# Patient Record
Sex: FEMALE | Race: BLACK OR AFRICAN AMERICAN | Employment: OTHER | URBAN - METROPOLITAN AREA
[De-identification: names, ages, dates, MRNs, and addresses within clinical notes are randomized per-mention and may not be internally consistent; named-entity substitution may affect disease eponyms.]

---

## 2017-06-20 ENCOUNTER — HOSPITAL ENCOUNTER (OUTPATIENT)
Dept: LAB | Age: 67
Discharge: HOME | End: 2017-06-20
Payer: MEDICARE

## 2017-06-20 LAB
ALBUMIN SERPL BCP-MCNC: 3.5 G/DL (ref 3.4–5)
ALBUMIN/GLOB SERPL: 1.1 {RATIO} (ref 0.8–1.7)
ALP SERPL-CCNC: 102 U/L (ref 45–117)
ALT SERPL-CCNC: 18 U/L (ref 13–56)
ANION GAP BLD CALC-SCNC: 11 MMOL/L (ref 3–18)
AST SERPL W P-5'-P-CCNC: 10 U/L (ref 15–37)
BILIRUB SERPL-MCNC: 0.3 MG/DL (ref 0.2–1)
BUN SERPL-MCNC: 23 MG/DL (ref 7–18)
BUN/CREAT SERPL: 22 (ref 12–20)
CALCIUM SERPL-MCNC: 9.3 MG/DL (ref 8.5–10.1)
CHLORIDE SERPL-SCNC: 103 MMOL/L (ref 100–108)
CHOLEST SERPL-MCNC: 192 MG/DL
CO2 SERPL-SCNC: 26 MMOL/L (ref 21–32)
CREAT SERPL-MCNC: 1.06 MG/DL (ref 0.6–1.3)
ERYTHROCYTE [DISTWIDTH] IN BLOOD BY AUTOMATED COUNT: 14.4 % (ref 11.6–14.5)
GLOBULIN SER CALC-MCNC: 3.1 G/DL (ref 2–4)
GLUCOSE SERPL-MCNC: 148 MG/DL (ref 74–99)
HBA1C MFR BLD: 8.1 % (ref 4.2–5.6)
HCT VFR BLD AUTO: 42.6 % (ref 35–45)
HDLC SERPL-MCNC: 39 MG/DL (ref 40–60)
HDLC SERPL: 4.9 {RATIO} (ref 0–5)
HGB BLD-MCNC: 13.8 G/DL (ref 12–16)
LDLC SERPL CALC-MCNC: 112.6 MG/DL (ref 0–100)
LIPID PROFILE,FLP: ABNORMAL
MCH RBC QN AUTO: 27.8 PG (ref 24–34)
MCHC RBC AUTO-ENTMCNC: 32.4 G/DL (ref 31–37)
MCV RBC AUTO: 85.7 FL (ref 74–97)
PLATELET # BLD AUTO: 281 K/UL (ref 135–420)
PMV BLD AUTO: 11.8 FL (ref 9.2–11.8)
POTASSIUM SERPL-SCNC: 4 MMOL/L (ref 3.5–5.5)
PROT SERPL-MCNC: 6.6 G/DL (ref 6.4–8.2)
RBC # BLD AUTO: 4.97 M/UL (ref 4.2–5.3)
SODIUM SERPL-SCNC: 140 MMOL/L (ref 136–145)
TRIGL SERPL-MCNC: 202 MG/DL (ref ?–150)
VLDLC SERPL CALC-MCNC: 40.4 MG/DL
WBC # BLD AUTO: 6.3 K/UL (ref 4.6–13.2)

## 2017-06-20 PROCEDURE — 80053 COMPREHEN METABOLIC PANEL: CPT | Performed by: INTERNAL MEDICINE

## 2017-06-20 PROCEDURE — 83036 HEMOGLOBIN GLYCOSYLATED A1C: CPT | Performed by: INTERNAL MEDICINE

## 2017-06-20 PROCEDURE — 80061 LIPID PANEL: CPT | Performed by: INTERNAL MEDICINE

## 2017-06-20 PROCEDURE — 36415 COLL VENOUS BLD VENIPUNCTURE: CPT | Performed by: INTERNAL MEDICINE

## 2017-06-20 PROCEDURE — 85027 COMPLETE CBC AUTOMATED: CPT | Performed by: INTERNAL MEDICINE

## 2017-06-23 NOTE — ACP (ADVANCE CARE PLANNING)
Advance Care Planning    Advance Care Planning (ACP) Provider Note - Comprehensive     Date of ACP Conversation: 06/27/17  Persons included in Conversation:  patient  Length of ACP Conversation in minutes:  16 minutes    Authorized Decision Maker (if patient is incapable of making informed decisions): This person is: sister Connor Bragg  Other Legally Authorized Decision Maker (e.g. Next of Kin)          General ACP for ALL Patients with Decision Making Capacity:   Importance of advance care planning, including choosing a healthcare agent to communicate patient's healthcare decisions if patient lost the ability to make decisions, such as after a sudden illness or accident  Understanding of the healthcare agent role was assessed and information provided  Exploration of values, goals, and preferences if recovery is not expected, even with continued medical treatment in the event of: Imminent death  Severe, permanent brain injury    Review of Existing Advance Directive:  na    For Serious or Chronic Illness:  Understanding of medical condition    Understanding of CPR, goals and expected outcomes, benefits and burdens discussed.     Interventions Provided:  Recommended completion of Advance Directive form after review of ACP materials and conversation with prospective healthcare agent   Recommended communicating the plan and making copies for the healthcare agent, personal physician, and others as appropriate (e.g., health system)  Recommended review of completed ACP document annually or upon change in health status

## 2017-06-23 NOTE — PROGRESS NOTES
This is a subsequent medicare wellness visit    I have reviewed the patient's medical history in detail and updated the computerized patient record. History     Past Medical History:   Diagnosis Date    Calculus of kidney 6/16    Dr Triston Colin; right ureteral stone s/p EWSL    Diabetes ([address]     Dr Elizabeth Cornelius GERD (gastroesophageal reflux disease)     Hyperlipidemia     Hypertension     Hypovitaminosis D       Past Surgical History:   Procedure Laterality Date    ECHO 2D ADULT  3/12    conc lvh, ef 60%, DD    HX BREAST BIOPSY  2009    negative    HX COLONOSCOPY  12/13    negative Dr Zahra Truong t score 1.10 spine, 1.10 hip (11/13)    STRESS TEST THALLIUM STUDY  3/12    negative w EF 70%     Current Outpatient Prescriptions   Medication Sig Dispense Refill    allopurinol (ZYLOPRIM) 100 mg tablet Take  by mouth daily.  insulin lispro protamine/insulin lispro (HUMALOG MIX 75/25) 100 unit/mL (75-25) injection 10 Units by SubCUTAneous route Daily (before breakfast). Indications: TYPE 2 DIABETES MELLITUS      canagliflozin (INVOKANA) 100 mg tab Take 300 mg by mouth daily. Indications: TYPE 2 DIABETES MELLITUS      carvedilol (COREG) 12.5 mg tablet Take 12.5 mg by mouth daily.  atorvastatin (LIPITOR) 20 mg tablet Take  by mouth daily.  metFORMIN (GLUCOPHAGE) 1,000 mg tablet Take 1,000 mg by mouth two (2) times daily (with meals).  amLODIPine (NORVASC) 10 mg tablet Take  by mouth daily.  olmesartan-hydrochlorothiazide (BENICAR HCT) 40-25 mg per tablet Take 1 Tab by mouth daily.  Cholecalciferol, Vitamin D3, (VITAMIN D) 5,000 unit Tab Take 2,000 Units by mouth.  Takes 12 units am and 14 units pm       No Known Allergies  Family History   Problem Relation Age of Onset    Diabetes Father     Hypertension Father     Cancer Sister     Hypertension Sister      Social History   Substance Use Topics    Smoking status: Passive Smoke Exposure - Never Smoker    Smokeless tobacco: Never Used    Alcohol use 1.2 oz/week     1 Glasses of wine, 1 Cans of beer, 0 Standard drinks or equivalent per week      Comment: social glass once a year     Diet, Lifestyle: generally follows a low fat low cholesterol diet, generally follows a low sodium diet, follows a diabetic diet regularly, exercises sporadically    Exercise level: not active    Depression Risk Screen     PHQ over the last two weeks 6/27/2017   Little interest or pleasure in doing things Not at all   Feeling down, depressed or hopeless Not at all   Total Score PHQ 2 0     SCREENINGS  Colonoscopy last done 12/13 Dr Sophia Borja last done 8/16  DEXA last done 11/13  Dr Willy Solares last done >5 yrs ago due to patient declining    Immunization History   Administered Date(s) Administered    Influenza High Dose Vaccine PF 12/27/2016    Influenza Vaccine 11/24/2015    Influenza Vaccine PF 12/18/2014    Influenza Vaccine Whole 10/01/2009, 12/07/2012    Pneumococcal Conjugate (PCV-13) 12/29/2015    Pneumococcal Vaccine (Unspecified Type) 02/29/2012    TDAP Vaccine 02/29/2012    Zoster 06/21/2012     Alcohol Risk Screen   On any occasion during the past 3 months, have you had more than 3 drinks containing alcohol? No    Do you average more than 7 drinks per week? No    Functional Ability and Level of Safety     Hearing Loss   normal-to-mild    Vision - no acute complaints and is followed by Dr Emma Gayle [facility]   Self-care     Fall Risk Screen     Fall Risk Assessment, last 12 mths 6/27/2017   Able to walk? Yes   Fall in past 12 months? No   Fall with injury? -   Number of falls in past 12 months -   Fall Risk Score -     Abuse Screen   Patient is not abused    Review of Systems   A comprehensive review of systems was negative except for that written in the HPI.     Physical Examination     Visit Vitals    /78 (BP 1 Location: Left arm, BP Patient Position: Sitting)    Pulse 76    Temp 98.4 °F (36.9 °C) (Oral)    Resp 17    Ht 5' 3\" (1.6 m)    Wt 158 lb (71.7 kg)    SpO2 98%    BMI 27.99 kg/m2       Patient Care Team:  Gibson Rondon MD as PCP - General (Internal Medicine)  Patty Mathew, RN as Ambulatory Care Navigator (Internal Medicine)  Leonard Jansen MD (Ophthalmology)  Randi Quan, CASSANDRA as Ambulatory Care Navigator (Internal Medicine)  Hilda Soto NP     End-of-life planning  Advanced Directive discussed and documented: YES    Assessment/Plan   Education and counseling provided:  Are appropriate based on today's review and evaluation       ICD-10-CM ICD-9-CM    1. Routine general medical examination at a health care facility Z00.00 V70.0    2. Screening for alcoholism Z13.89 V79.1    3. Advanced directives, counseling/discussion Z71.89 V65.49 ADVANCE CARE PLANNING FIRST 30 MINS   4. Screening for depression Z13.89 V79.0 DEPRESSION SCREEN ANNUAL   5. Screen for colon cancer Z12.11 V76.51    6. Screening for ischemic heart disease Z13.6 V81.0    7. Encounter for screening mammogram for malignant neoplasm of breast Z12.31 V76.12    8. Postmenopausal Z78.0 V49.81    9. Uncontrolled type 2 diabetes mellitus without complication, with long-term current use of insulin (HCC) E11.65 250.02 MICROALBUMIN, UR, RAND W/ MICROALBUMIN/CREA RATIO    Z79.4 V58.67 HEMOGLOBIN A1C W/O EAG   10. Dyslipidemia E78.5 272.4    11. Essential hypertension I10 401.9    12. Nephrolithiasis s/p EWSL Dr Sandra Arevalo 6/16 N20.0 592.0    13. GERD without esophagitis K21.9 530.81      current treatment plan is effective, no change in therapy  lab results and schedule of future lab studies reviewed with patient  reviewed diet, exercise and weight control  cardiovascular risk and specific lipid/LDL goals reviewed  use of aspirin to prevent MI and TIA's discussed. End of life discussion undertaken.   She will work on getting medical directive in place  Colonoscopy to be scheduled 2023  Mammo scheduled  DEXA to be scheduled per Dr Avis Linares

## 2017-06-23 NOTE — PATIENT INSTRUCTIONS
Medicare Part B Preventive Services Limitations Recommendation Scheduled   Bone Mass Measurement  (age 72 & older, biennial) Requires diagnosis related to osteoporosis or estrogen deficiency. Biennial benefit unless patient has history of long-term glucocorticoid tx or baseline is needed because initial test was by other method     Cardiovascular Screening Blood Tests (every 5 years)  Total cholesterol, HDL, Triglycerides Order as a panel if possible     Colorectal Cancer Screening  -Fecal occult blood test (annual)  -Flexible sigmoidoscopy (5y)  -Screening colonoscopy (10y)  -Barium Enema      Counseling to Prevent Tobacco Use (up to 8 sessions per year)  - Counseling greater than 3 and up to 10 minutes  - Counseling greater than 10 minutes Patients must be asymptomatic of tobacco-related conditions to receive as preventive service     Diabetes Screening Tests (at least every 3 years, Medicare covers annually or at 6-month intervals for prediabetic patients)    Fasting blood sugar (FBS) or glucose tolerance test (GTT) Patient must be diagnosed with one of the following:  -Hypertension, Dyslipidemia, obesity, previous impaired FBS or GTT  Or any two of the following: overweight, FH of diabetes, age ? 72, history of gestational diabetes, birth of baby weighing more than 9 pounds     Diabetes Self-Management Training (DSMT) (no USPSTF recommendation) Requires referral by treating physician for patient with diabetes or renal disease. 10 hours of initial DSMT session of no less than 30 minutes each in a continuous 12-month period. 2 hours of follow-up DSMT in subsequent years.      Glaucoma Screening (no USPSTF recommendation) Diabetes mellitus, family history, , age 48 or over,  American, age 72 or over     Human Immunodeficiency Virus (HIV) Screening (annually for increased risk patients)  HIV-1 and HIV-2 by EIA, FRANCINE, rapid antibody test, or oral mucosa transudate Patient must be at increased risk for HIV infection per USPSTF guidelines or pregnant. Tests covered annually for patients at increased risk. Pregnant patients may receive up to 3 test during pregnancy. Medical Nutrition Therapy (MNT) (for diabetes or renal disease not recommended schedule) Requires referral by treating physician for patient with diabetes or renal disease. Can be provided in same year as diabetes self-management training (DSMT), and CMS recommends medical nutrition therapy take place after DSMT. Up to 3 hours for initial year and 2 hours in subsequent years. Prostate Cancer Screening (annually up to age 76)  - Digital rectal exam (HITESH)  - Prostate specific antigen (PSA) Annually (age 48 or over), HITESH not paid separately when covered E/M service is provided on same date     Seasonal Influenza Vaccination (annually)      Pneumococcal Vaccination (once after 72)      Hepatitis B Vaccinations (if medium/high risk) Medium/high risk factors:  End-stage renal disease,  Hemophiliacs who received Factor VIII or IX concentrates, Clients of institutions for the mentally retarded, Persons who live in the same house as a HepB virus carrier, Homosexual men, Illicit injectable drug abusers. Screening Mammography (biennial age 54-69)? Annually (age 36 or over)     Screening Pap Tests and Pelvic Examination (up to age 79 and after 79 if unknown history or abnormal study last 10 years) Every 25 months except high risk     Ultrasound Screening for Abdominal Aortic Aneurysm (AAA) (once) Patient must be referred through IPPE and not have had a screening for abdominal aortic aneurysm before under Medicare.   Limited to patients who meet one of the following criteria:  - Men who are 73-68 years old and have smoked more than 100 cigarettes in their lifetime.  -Anyone with a FH of AAA  -Anyone recommended for screening by USPSTF         Vaccine Information Statement    Pneumococcal Polysaccharide Vaccine: What You Need to Know    Many Vaccine Information Statements are available in [place] and other languages. See www.immunize.org/vis. Hojas de información Sobre Vacunas están disponibles en español y en muchos otros idiomas. Visite Patience.si. 1. Why get vaccinated? Vaccination can protect older adults (and some children and younger adults) from pneumococcal disease. Pneumococcal disease is caused by bacteria that can spread from person to person through close contact. It can cause ear infections, and it can also lead to more serious infections of the:   Lungs (pneumonia),   Blood (bacteremia), and   Covering of the brain and spinal cord (meningitis). Meningitis can cause deafness and brain damage, and it can be fatal.      Anyone can get pneumococcal disease, but children under 3years of age, people with certain medical conditions, adults over 72years of age, and cigarette smokers are at the highest risk. About 18,000 older adults die each year from pneumococcal disease in the [place]. Treatment of pneumococcal infections with penicillin and other drugs used to be more effective. But some strains of the disease have become resistant to these drugs. This makes prevention of the disease, through vaccination, even more important. 2. Pneumococcal polysaccharide vaccine (PPSV23)    Pneumococcal polysaccharide vaccine (PPSV23) protects against 23 types of pneumococcal bacteria. It will not prevent all pneumococcal disease. PPSV23 is recommended for:   All adults 72years of age and older,   Anyone 2 through 59years of age with certain long-term health problems,   Anyone 2 through 59years of age with a weakened immune system,   Adults 23 through 59years of age who smoke cigarettes or have asthma. Most people need only one dose of PPSV. A second dose is recommended for certain high-risk groups.   People 72 and older should get a dose even if they have gotten one or more doses of the vaccine before they turned 72. Your healthcare provider can give you more information about these recommendations. Most healthy adults develop protection within 2 to 3 weeks of getting the shot. 3. Some people should not get this vaccine     Anyone who has had a life-threatening allergic reaction to PPSV should not get another dose.  Anyone who has a severe allergy to any component of PPSV should not receive it. Tell your provider if you have any severe allergies.  Anyone who is moderately or severely ill when the shot is scheduled may be asked to wait until they recover before getting the vaccine. Someone with a mild illness can usually be vaccinated.  Children less than 3years of age should not receive this vaccine.  There is no evidence that PPSV is harmful to either a pregnant woman or to her fetus. However, as a precaution, women who need the vaccine should be vaccinated before becoming pregnant, if possible. 4. Risks of a vaccine reaction    With any medicine, including vaccines, there is a chance of side effects. These are usually mild and go away on their own, but serious reactions are also possible. About half of people who get PPSV have mild side effects, such as redness or pain where the shot is given, which go away within about two days. Less than 1 out of 100 people develop a fever, muscle aches, or more severe local reactions. Problems that could happen after any vaccine:     People sometimes faint after a medical procedure, including vaccination. Sitting or lying down for about 15 minutes can help prevent fainting, and injuries caused by a fall. Tell your doctor if you feel dizzy, or have vision changes or ringing in the ears.  Some people get severe pain in the shoulder and have difficulty moving the arm where a shot was given. This happens very rarely.  Any medication can cause a severe allergic reaction.  Such reactions from a vaccine are very rare, estimated at about 1 in a million doses, and would happen within a few minutes to a few hours after the vaccination. As with any medicine, there is a very remote chance of a vaccine causing a serious injury or death. The safety of vaccines is always being monitored. For more information, visit: www.cdc.gov/vaccinesafety/     5. What if there is a serious reaction? What should I look for? Look for anything that concerns you, such as signs of a severe allergic reaction, very high fever, or unusual behavior. Signs of a severe allergic reaction can include hives, swelling of the face and throat, difficulty breathing, a fast heartbeat, dizziness, and weakness. These would usually start a few minutes to a few hours after the vaccination. What should I do? If you think it is a severe allergic reaction or other emergency that cant wait, call 9-1-1 or get to the nearest hospital. Otherwise, call your doctor. Afterward, the reaction should be reported to the Vaccine Adverse Event Reporting System (VAERS). Your doctor might file this report, or you can do it yourself through the VAERS web site at www.vaers. hhs.gov, or by calling 2-296.282.5313. VAERS does not give medical advice. 6. How can I learn more?  Ask your doctor. He or she can give you the vaccine package insert or suggest other sources of information.  Call your local or state health department.    Contact the Centers for Disease Control and Prevention (CDC):  - Call 2-619.124.4905 (1-800-CDC-INFO) or  - Visit CDCs website at [employer] 18 04/24/2015    [facility] for Disease Control and Prevention    Office Use Only

## 2017-06-23 NOTE — PROGRESS NOTES
77 y.o. BLACK OR  female who presents for evaluation. She seems to be doing well. No cardiovascular complaints. She continues to see Nika Vincent. Denies polyuria, polydipsia, nocturia, vision change. She is complaining about the cost of the insulin as she's in the donut hole already. Weight is up some    Vitals 6/27/2017 12/27/2016 7/7/2016 6/30/2016   Weight 158 lb 161 lb 150 lb 150 lb     No new GI or Gu complaints. She admits to missing doses of lipitor    She apparently ha da bad 3-4 week stretch in May where she had uri, went to urgent care for treatment    Past Medical History:   Diagnosis Date    Calculus of kidney 6/16    Dr Lyndel Leyden; right ureteral stone s/p EWSL    Diabetes ([address]     Dr Mj Wong GERD (gastroesophageal reflux disease)     Hyperlipidemia     Hypertension     Hypovitaminosis D      Past Surgical History:   Procedure Laterality Date    ECHO 2D ADULT  3/12    conc lvh, ef 60%, DD    HX BREAST BIOPSY  2009    negative    HX COLONOSCOPY  12/13    negative Dr Knott Och t score 1.10 spine, 1.10 hip (11/13)    STRESS TEST THALLIUM STUDY  3/12    negative w EF 70%     Social History     Social History    Marital status:      Spouse name: N/A    Number of children: N/A    Years of education: N/A     Occupational History    retired teacher [employer]      Social History Main Topics    Smoking status: Passive Smoke Exposure - Never Smoker    Smokeless tobacco: Never Used    Alcohol use 1.2 oz/week     1 Glasses of wine, 1 Cans of beer, 0 Standard drinks or equivalent per week      Comment: social glass once a year    Drug use: No    Sexual activity: Not on file     Other Topics Concern    Not on file     Social History Narrative     No Known Allergies    Current Outpatient Prescriptions   Medication Sig    allopurinol (ZYLOPRIM) 100 mg tablet Take  by mouth daily.     insulin lispro protamine/insulin lispro (HUMALOG MIX 75/25) 100 unit/mL (75-25) injection 10 Units by SubCUTAneous route Daily (before breakfast). Indications: TYPE 2 DIABETES MELLITUS    canagliflozin (INVOKANA) 100 mg tab Take 300 mg by mouth daily. Indications: TYPE 2 DIABETES MELLITUS    carvedilol (COREG) 12.5 mg tablet Take 12.5 mg by mouth daily.  atorvastatin (LIPITOR) 20 mg tablet Take  by mouth daily.  metFORMIN (GLUCOPHAGE) 1,000 mg tablet Take 1,000 mg by mouth two (2) times daily (with meals).  amLODIPine (NORVASC) 10 mg tablet Take  by mouth daily.  olmesartan-hydrochlorothiazide (BENICAR HCT) 40-25 mg per tablet Take 1 Tab by mouth daily.  Cholecalciferol, Vitamin D3, (VITAMIN D) 5,000 unit Tab Take 2,000 Units by mouth. Takes 12 units am and 14 units pm     No current facility-administered medications for this visit.       LAST MEDICARE WELLNESS EXAM: 12/29/15, 6/27/17   ACP 6/27/17    REVIEW OF SYSTEMS: gyn>5 yrs, mammo 8/16, DEXA 11/13, sees Dr Bhaskar Mishra, no podiatry, colo 12/13 Dr Urmila Farris  Ophtho - no vision change or eye pain  Oral - no mouth pain, tongue or tooth problems  Ears - no hearing loss, ear pain, fullness, no swallowing problems  Cardiac - no CP, PND, orthopnea, edema, palpitations or syncope  Chest - no breast masses  Resp - no wheezing, chronic coughing, dyspnea  GI - no heartburn, nausea, vomiting, change in bowel habits, bleeding, hemorrhoids  Urinary - no dysuria, hematuria, flank pain, urgency, frequency  Genitals - no genital lesions, discharge, masses, ulceration, warts  Ortho - no swelling, dec ROM, myalgias  Derm - no nail abnormalities, rashes, lesions of note, hair loss  Psych - denies any anxiety or depression symptoms, no hallucinations or violent ideation  Constitutional - no wt loss, night sweats, unexplained fevers  Neuro - no focal weakness, numbness, paresthesias, incoordination, ataxia, involuntary movements  Endo - no polyuria, polydipsia, nocturia, hot flashes    Visit Vitals    BP 132/78 (BP 1 Location: Left arm, BP Patient Position: Sitting)    Pulse 76    Temp 98.4 °F (36.9 °C) (Oral)    Resp 17    Ht 5' 3\" (1.6 m)    Wt 158 lb (71.7 kg)    SpO2 98%    BMI 27.99 kg/m2     A&O x3  Affect is appropriate. Mood stable  No apparent distress  Anicteric, no JVD, adenopathy or thyromegaly. No carotid bruits or radiated murmur  Lungs clear to auscultation, no wheezes or rales  Heart showed regular rate and rhythm. No murmur, rubs, gallops  Abdomen soft nontender, no hepatosplenomegaly or masses.    Ext without c/c/e  Foot exam bilaterally showed  Reflexes 2+   Vibration, proprioception, filament test intact  Pulses 1-2+ DP and PT        LABS  From 2/12 showed   gluc 330, cr 0.93, gfr 77,  alt 30, hba1c 11.2,                   chol 199, tg 366, hdl 35, ldl-c 67, wbc 7.1, hb 15.2, plt 273, tsh 2.20  From 6/12 showed                   hba1c 8.2,   ldl-p 1018, chol 112, tg 95,   hdl 32, ldl-c 61, umar 1.5  From 12/12 showed                   hba1c 8.9,   ldl-p 1486, chol 132, tg 109, hdl 37, ldl-c 73  From 6/13 showed   gluc 128, cr 0.91, gfr 68,  alt 13, hba1c 7.8,                  chol 115, tg 157, hdl 28, ldl-c 56  From 12/13 showed                   hba1c 7.5,                wbc 7.5, hb 13.2, plt 297, vit d 69.2  From 6/14 showed   gluc 171, cr 0.85, gfr>60, alt<5,        chol 134, tg 165, hdl 26, ldl-c 73  From 12/14 showed        hba1c 9.2,       chol 151, tg 256, hdl 33, ldl-c 67, umar 4.0  From 3/15 showed       hba1c 7.3,              ldl-c 82, umar neg  From 7/15 showed   gluc 130, cr 1.16, gfr 49,  alt 15, hba1c 7.5,       chol 128, tg 271, hdl 29, ldl-c 45  From 12/15 showed        hba1c 9.6,        chol 160, tg 218, hdl 33, ldl-c 83, umar 7.0  From 2/16 showed   gluc 117, cr 1.15, gfr 58,  alt 18, hba1c 7.9,       chol 112, tg 184, hdl 30, ldl-c 45, umar 4.3, vit d 45.3, uric 7.2  From 6/16 showed   gluc 98      hba1c 7.7,       chol 143, tg 178, hdl 31, ldl-c 76  From 12/16 showed gluc 170, cr 1.11, gfr 60,    hba1c 8.9,                 umar 4    Results for orders placed or performed during the hospital encounter of 06/20/17   LIPID PANEL   Result Value Ref Range    LIPID PROFILE          Cholesterol, total 192 <200 MG/DL    Triglyceride 202 (H) <150 MG/DL    HDL Cholesterol 39 (L) 40 - 60 MG/DL    LDL, calculated 112.6 (H) 0 - 100 MG/DL    VLDL, calculated 40.4 MG/DL    CHOL/HDL Ratio 4.9 0 - 5.0     HEMOGLOBIN A1C W/O EAG   Result Value Ref Range    Hemoglobin A1c 8.1 (H) 4.2 - 5.6 %   METABOLIC PANEL, COMPREHENSIVE   Result Value Ref Range    Sodium 140 136 - 145 mmol/L    Potassium 4.0 3.5 - 5.5 mmol/L    Chloride 103 100 - 108 mmol/L    CO2 26 21 - 32 mmol/L    Anion gap 11 3.0 - 18 mmol/L    Glucose 148 (H) 74 - 99 mg/dL    BUN 23 (H) 7.0 - 18 MG/DL    Creatinine 1.06 0.6 - 1.3 MG/DL    BUN/Creatinine ratio 22 (H) 12 - 20      GFR est AA >60 >60 ml/min/1.73m2    GFR est non-AA 52 (L) >60 ml/min/1.73m2    Calcium 9.3 8.5 - 10.1 MG/DL    Bilirubin, total 0.3 0.2 - 1.0 MG/DL    ALT (SGPT) 18 13 - 56 U/L    AST (SGOT) 10 (L) 15 - 37 U/L    Alk.  phosphatase 102 45 - 117 U/L    Protein, total 6.6 6.4 - 8.2 g/dL    Albumin 3.5 3.4 - 5.0 g/dL    Globulin 3.1 2.0 - 4.0 g/dL    A-G Ratio 1.1 0.8 - 1.7     CBC W/O DIFF   Result Value Ref Range    WBC 6.3 4.6 - 13.2 K/uL    RBC 4.97 4.20 - 5.30 M/uL    HGB 13.8 12.0 - 16.0 g/dL    HCT 42.6 35.0 - 45.0 %    MCV 85.7 74.0 - 97.0 FL    MCH 27.8 24.0 - 34.0 PG    MCHC 32.4 31.0 - 37.0 g/dL    RDW 14.4 11.6 - 14.5 %    PLATELET 310 586 - 265 K/uL    MPV 11.8 9.2 - 11.8 FL     Patient Active Problem List   Diagnosis Code    Hypovitaminosis D E55.9    Dyslipidemia E78.5    Essential hypertension I10    GERD without esophagitis K21.9    Advance directive discussed with patient Z70.80    Nephrolithiasis s/p EWSL Dr Aly Ansari 6/16 N20.0    Uncontrolled type 2 diabetes mellitus without complication, with long-term current use of insulin NP Monica Hernandez E11.65, Z79.4     Assessment and plan:  1. Diabetes. F/u w [facility], Dr Darian Morales, no podiatry at this time. Discussed possibility of chaning over to humulin but she will have to take that up w endocrine  2. Dyslipidemia. Continue current   3. Hypertension. Continue current regimen althouogh we could possibly change to losartan/hct which might be cheaper  4. GERD. PPI and avoidance measures  5. Hypovit d. Doing well   6. Screening mammo ordered  7. Nephrolithiasis. F/U Dr Kaushik Davis        RTC 12/17      Above conditions discussed at length and patient vocalized understanding.   All questions answered to patient satifaction

## 2017-06-27 ENCOUNTER — OFFICE VISIT (OUTPATIENT)
Dept: INTERNAL MEDICINE CLINIC | Age: 67
End: 2017-06-27

## 2017-06-27 VITALS
HEIGHT: 63 IN | RESPIRATION RATE: 17 BRPM | HEART RATE: 76 BPM | DIASTOLIC BLOOD PRESSURE: 78 MMHG | TEMPERATURE: 98.4 F | BODY MASS INDEX: 28 KG/M2 | WEIGHT: 158 LBS | SYSTOLIC BLOOD PRESSURE: 132 MMHG | OXYGEN SATURATION: 98 %

## 2017-06-27 DIAGNOSIS — Z13.6 SCREENING FOR ISCHEMIC HEART DISEASE: ICD-10-CM

## 2017-06-27 DIAGNOSIS — Z12.31 ENCOUNTER FOR SCREENING MAMMOGRAM FOR MALIGNANT NEOPLASM OF BREAST: ICD-10-CM

## 2017-06-27 DIAGNOSIS — N20.0 NEPHROLITHIASIS: ICD-10-CM

## 2017-06-27 DIAGNOSIS — Z71.89 ADVANCED DIRECTIVES, COUNSELING/DISCUSSION: ICD-10-CM

## 2017-06-27 DIAGNOSIS — Z78.0 POSTMENOPAUSAL: ICD-10-CM

## 2017-06-27 DIAGNOSIS — Z12.11 SCREEN FOR COLON CANCER: ICD-10-CM

## 2017-06-27 DIAGNOSIS — Z00.00 ROUTINE GENERAL MEDICAL EXAMINATION AT A HEALTH CARE FACILITY: Primary | ICD-10-CM

## 2017-06-27 DIAGNOSIS — Z13.31 SCREENING FOR DEPRESSION: ICD-10-CM

## 2017-06-27 DIAGNOSIS — Z23 ENCOUNTER FOR IMMUNIZATION: ICD-10-CM

## 2017-06-27 DIAGNOSIS — Z13.39 SCREENING FOR ALCOHOLISM: ICD-10-CM

## 2017-06-27 DIAGNOSIS — E78.5 DYSLIPIDEMIA: ICD-10-CM

## 2017-06-27 DIAGNOSIS — K21.9 GERD WITHOUT ESOPHAGITIS: ICD-10-CM

## 2017-06-27 DIAGNOSIS — I10 ESSENTIAL HYPERTENSION: ICD-10-CM

## 2017-06-27 NOTE — PROGRESS NOTES
Chief Complaint   Patient presents with    Results     labs. 6 months f/u    Hypertension    Diabetes    Vitamin D Deficiency    Cholesterol Problem     Patient stated that she is here for her routine follow up with lab review. 1. Have you been to the ER, urgent care clinic since your last visit? Hospitalized since your last visit? Yes, [facility] with the [facility]. Notes under media. 2. Have you seen or consulted any other health care providers outside of the [address] [place] [address] since your last visit? Include any pap smears or colon screening. Yes. [facility].    Zoe Cabello is a 77 y.o. female who presents for routine immunizations. She denies any symptoms , reactions or allergies that would exclude them from being immunized today. Risks and adverse reactions were discussed and the VIS was given to them. All questions were addressed. She was observed for 5 min post injection. There were no reactions observed.     Marcello Massey LPN

## 2017-06-27 NOTE — MR AVS SNAPSHOT
Visit Information Date & Time Provider Department Dept. Phone Encounter #  
 6/27/2017  8:00 AM Karin Alpers, MD Internist of [address] [place] [address] [phone] [id number] Your Appointments 1/2/2018  8:00 AM  
LAB with IOC NURSE VISIT Internist of [facility] ([facility]) Appt Note: lab  
 [address], [address] [facility] [address] [place]  
  
   
 [address], [address]  
  
    
 1/9/2018  3:00 PM  
PHYSICAL with Karin Alpers, MD  
Internist of [facility] [address] [place] [address]) Appt Note: r[address] rd   
 [address] [facility], [address] [address] [address] [place]  
  
   
 [address], [address] Upcoming Health Maintenance Date Due  
 EYE EXAM RETINAL OR DILATED Q1 10/9/2016 INFLUENZA AGE 9 TO ADULT 8/1/2017 GLAUCOMA SCREENING Q2Y 10/9/2017 HEMOGLOBIN A1C Q6M 12/20/2017 MICROALBUMIN Q1 12/20/2017 LIPID PANEL Q1 6/20/2018 FOOT EXAM Q1 6/27/2018 MEDICARE YEARLY EXAM 6/28/2018 BREAST CANCER SCRN MAMMOGRAM 12/30/2018 DTaP/Tdap/Td series (2 - Td) 2/28/2022 COLONOSCOPY 12/2/2023 Allergies as of 6/27/2017  Review Complete On: 6/27/2017 By: Karin Alpers, MD  
 No Known Allergies Current Immunizations  Reviewed on 12/29/2015 Name Date Influenza High Dose Vaccine PF 12/27/2016 Influenza Vaccine 11/24/2015 Influenza Vaccine PF 12/18/2014 11:25 AM  
 Influenza Vaccine Whole 12/7/2012, 10/1/2009 Pneumococcal Conjugate (PCV-13) 12/29/2015  9:50 AM  
 Pneumococcal Vaccine (Unspecified Type) 2/29/2012 TDAP Vaccine 2/29/2012 Zoster 6/21/2012 Not reviewed this visit You Were Diagnosed With   
  
 Codes Comments Routine general medical examination at a health care facility    -  Primary ICD-10-CM: Z00.00 ICD-9-CM: V70.0 Screening for alcoholism     ICD-10-CM: Z13.89 ICD-9-CM: V79.1 Advanced directives, counseling/discussion     ICD-10-CM: Z71.89 ICD-9-CM: V65.49 Screening for depression     ICD-10-CM: Z13.89 ICD-9-CM: V79.0 Screen for colon cancer     ICD-10-CM: Z12.11 ICD-9-CM: V76.51 Screening for ischemic heart disease     ICD-10-CM: Z13.6 ICD-9-CM: V81.0 Encounter for screening mammogram for malignant neoplasm of breast     ICD-10-CM: Z12.31 
ICD-9-CM: V76.12 Postmenopausal     ICD-10-CM: Z78.0 ICD-9-CM: V49.81 Uncontrolled type 2 diabetes mellitus without complication, with long-term current use of insulin (HCC)     ICD-10-CM: E11.65, Z79.4 ICD-9-CM: 250.02, V58.67 Dyslipidemia     ICD-10-CM: E78.5 ICD-9-CM: 272.4 Essential hypertension     ICD-10-CM: I10 
ICD-9-CM: 401.9 Nephrolithiasis     ICD-10-CM: N20.0 ICD-9-CM: 592.0 GERD without esophagitis     ICD-10-CM: K21.9 ICD-9-CM: 530.81 Vitals BP Pulse Temp Resp Height(growth percentile) Weight(growth percentile) 132/78 (BP 1 Location: Left arm, BP Patient Position: Sitting) 76 98.4 °F (36.9 °C) (Oral) 17 5' 3\" (1.6 m) 158 lb (71.7 kg) SpO2 BMI OB Status Smoking Status 98% 27.99 kg/m2 Postmenopausal Passive Smoke Exposure - Never Smoker Vitals History BMI and BSA Data Body Mass Index Body Surface Area  
 27.99 kg/m 2 1.79 m 2 Preferred Pharmacy Pharmacy Name Phone [id number]. [place], [address] [facility] [phone] Your Updated Medication List  
  
   
This list is accurate as of: 6/27/17  8:43 AM.  Always use your most recent med list.  
  
  
  
  
 allopurinol 100 mg tablet Commonly known as:  Jose Elias Maximiliano Take  by mouth daily. atorvastatin 20 mg tablet Commonly known as:  LIPITOR Take  by mouth daily. BENICAR HCT 40-25 mg per tablet Generic drug:  olmesartan-hydroCHLOROthiazide Take 1 Tab by mouth daily. COREG 12.5 mg tablet Generic drug:  carvedilol Take 12.5 mg by mouth daily. insulin lispro protamine/insulin lispro 100 unit/mL (75-25) injection Commonly known as:  HUMALOG MIX 75/25  
10 Units by SubCUTAneous route Daily (before breakfast). Indications: TYPE 2 DIABETES MELLITUS INVOKANA 100 mg tablet Generic drug:  canagliflozin Take 300 mg by mouth daily. Indications: TYPE 2 DIABETES MELLITUS  
  
 metFORMIN 1,000 mg tablet Commonly known as:  GLUCOPHAGE Take 1,000 mg by mouth two (2) times daily (with meals). NORVASC 10 mg tablet Generic drug:  amLODIPine Take  by mouth daily. VITAMIN D 5,000 unit Tab tablet Generic drug:  cholecalciferol (VITAMIN D3) Take 2,000 Units by mouth. Takes 12 units am and 14 units pm  
  
  
  
  
We Performed the Following ADVANCE CARE PLANNING FIRST 30 MINS [53411 CPT(R)] Lyudmila 68 [[id number] \[facility] [place] [facility]\""] To-Do List   
 12/26/2017 Lab:  MICROALBUMIN, UR, RAND W/ MICROALBUMIN/CREA RATIO   
  
 12/27/2017 Lab:  HEMOGLOBIN A1C W/O EAG Patient Instructions Medicare Part B Preventive Services Limitations Recommendation Scheduled Bone Mass Measurement 
(age 72 & older, biennial) Requires diagnosis related to osteoporosis or estrogen deficiency. Biennial benefit unless patient has history of long-term glucocorticoid tx or baseline is needed because initial test was by other method Cardiovascular Screening Blood Tests (every 5 years) Total cholesterol, HDL, Triglycerides Order as a panel if possible Colorectal Cancer Screening 
-Fecal occult blood test (annual) -Flexible sigmoidoscopy (5y) 
-Screening colonoscopy (10y) -Barium Enema Counseling to Prevent Tobacco Use (up to 8 sessions per year) - Counseling greater than 3 and up to 10 minutes - Counseling greater than 10 minutes Patients must be asymptomatic of tobacco-related conditions to receive as preventive service Diabetes Screening Tests (at least every 3 years, Medicare covers annually or at 6-month intervals for prediabetic patients) Fasting blood sugar (FBS) or glucose tolerance test (GTT) Patient must be diagnosed with one of the following: 
-Hypertension, Dyslipidemia, obesity, previous impaired FBS or GTT 
Or any two of the following: overweight, FH of diabetes, age ? 72, history of gestational diabetes, birth of baby weighing more than 9 pounds Diabetes Self-Management Training (DSMT) (no USPSTF recommendation) Requires referral by treating physician for patient with diabetes or renal disease. 10 hours of initial DSMT session of no less than 30 minutes each in a continuous 12-month period. 2 hours of follow-up DSMT in subsequent years. Glaucoma Screening (no USPSTF recommendation) Diabetes mellitus, family history, , age 48 or over,  American, age 72 or over Human Immunodeficiency Virus (HIV) Screening (annually for increased risk patients) HIV-1 and HIV-2 by EIA, FRANCINE, rapid antibody test, or oral mucosa transudate Patient must be at increased risk for HIV infection per USPSTF guidelines or pregnant. Tests covered annually for patients at increased risk. Pregnant patients may receive up to 3 test during pregnancy. Medical Nutrition Therapy (MNT) (for diabetes or renal disease not recommended schedule) Requires referral by treating physician for patient with diabetes or renal disease. Can be provided in same year as diabetes self-management training (DSMT), and CMS recommends medical nutrition therapy take place after DSMT. Up to 3 hours for initial year and 2 hours in subsequent years. Prostate Cancer Screening (annually up to age 76) - Digital rectal exam (HITESH) - Prostate specific antigen (PSA) Annually (age 48 or over), HITESH not paid separately when covered E/M service is provided on same date Seasonal Influenza Vaccination (annually) Pneumococcal Vaccination (once after 65) Hepatitis B Vaccinations (if medium/high risk) Medium/high risk factors:  End-stage renal disease, Hemophiliacs who received Factor VIII or IX concentrates, Clients of institutions for the mentally retarded, Persons who live in the same house as a HepB virus carrier, Homosexual men, Illicit injectable drug abusers. Screening Mammography (biennial age 54-69)? Annually (age 36 or over) Screening Pap Tests and Pelvic Examination (up to age 79 and after 79 if unknown history or abnormal study last 10 years) Every 24 months except high risk Ultrasound Screening for Abdominal Aortic Aneurysm (AAA) (once) Patient must be referred through IPPE and not have had a screening for abdominal aortic aneurysm before under Medicare. Limited to patients who meet one of the following criteria: 
- Men who are 73-68 years old and have smoked more than 100 cigarettes in their lifetime. 
-Anyone with a FH of AAA 
-Anyone recommended for screening by USPSTF Introducing [facility] [place] [facility]! Bree Bradshaw introduces [employer] patient portal. Now you can access parts of your medical record, email your doctor's office, and request medication refills online. 1. In your internet browser, go to [email or web address] 2. Click on the First Time User? Click Here link in the Sign In box. You will see the New Member Sign Up page. 3. Enter your [employer] Access Code exactly as it appears below. You will not need to use this code after youve completed the sign-up process. If you do not sign up before the expiration date, you must request a new code. · [employer] Access Code: [email or web address] Expires: 7/24/2017 11:49 AM 
 
4. Enter the last four digits of your Social Security Number (xxxx) and Date of Birth (mm/dd/yyyy) as indicated and click Submit. You will be taken to the next sign-up page. 5. Create a [employer] ID. This will be your [employer] login ID and cannot be changed, so think of one that is secure and easy to remember. 6. Create a [employer] password. You can change your password at any time. 7. Enter your Password Reset Question and Answer. This can be used at a later time if you forget your password. 8. Enter your e-mail address. You will receive e-mail notification when new information is available in [address] 9. Click Sign Up. You can now view and download portions of your medical record. 10. Click the Download Summary menu link to download a portable copy of your medical information. If you have questions, please visit the Frequently Asked Questions section of the [employer] website. Remember, [employer] is NOT to be used for urgent needs. For medical emergencies, dial 911. Now available from your iPhone and Android! Please provide this summary of care documentation to your next provider. Your primary care clinician is listed as Zollie Button. If you have any questions after today's visit, please call [phone].

## 2017-08-02 ENCOUNTER — HOSPITAL ENCOUNTER (OUTPATIENT)
Dept: NUTRITION | Age: 67
Discharge: HOME | End: 2017-08-02
Payer: MEDICARE

## 2017-08-02 PROCEDURE — 97802 MEDICAL NUTRITION INDIV IN: CPT

## 2017-08-02 NOTE — PROGRESS NOTES
[address] [place] [address]  [address] [place] [address], Dilma Ward  Phone: [phone]  Fax: [phone]   Nutrition Assessment - Medical Nutrition Therapy   Outpatient Initial Evaluation         Patient Name: Jan North : 1950   Treatment Diagnosis: DM, mild CKD Onset Date:    Referral Source: Shiv Aponte MD Start of Care [facility]): 2017     Ht: 63 In  cm Wt: 154 lb  kg   BMI: 27.3  BMR   Male  BMR female [id number]     PMHx includes: DM, mild CKD, HTN     Medications of Nutritional Significance:   Metformin, Invokana, Humalog mix 12u Breakfast & 14u Dinner, BP meds, Vit D3     Subjective/Assessment:   76 yo female [facility] by [facility] for help with blood sugar control and meal planning. Pt states she baby sits a lot; sometimes stays up late and sleeps in (misses breakfast, has one meal, not hungry for dinner). Wants to work on consistency in meal schedule - discussed meal timing. Pt would also like help with meal planning; states she is getting tired of eating the same things - educated with balanced meal model and explained the pathophysiology of Type II Diabetes and insulin resistance and the rationale for dietary modification and increased activity. PT currently has a routine of walking on the treadmill at the gym for at least 30 mins 3x/wk. She is working her routine back up after experiencing Vertigo while at the gym ~1 yr ago. Pt is motivated to make changes to her eating habits and verbalized understanding recommendations; compliance is expected. Current Eating Patterns: Inconsistent meal schedule due to sleep pattern. If she has breakfast, it will be 1 egg, 3 strips celis & 1 toast (happens ~3x/wk). Snack throughout the day on vanilla wafers, cookies or an apple with PB. Drinks water with lemon, ~2 cans of soda per day, sometimes sweet tea - she is upset that she has started drinking soda again and wants to cut it out. Handouts/  Information Provided: [x]  Carbohydrates  [x]  Protein  []  Fiber  []  Serving Sizes  [x]  Meal and Snack Ideas  []  Food Journals [x]  Diabetes  []  Cholesterol  []  Sodium  [x]  Gen Nutr Guidelines  [x]  SBGM Guidelines  [x]  Others: Food label, non-starchy veg   Estimate Needs:   Calories: 1450  Protein: 55 Carbs: 180 Fat: 55   Kcal/day  g/day  g/day  g/day         0.8g/kg  50%  35%     Nutritional Goal - To promote lifestyle changes to result in:    []  Weight loss  [x]  Improved diabetic control  []  Decreased cholesterol levels  []  Decreased blood pressure  []  Weight maintenance []  Preventing any interactions associated with food allergies  []  Adequate weight gain toward goal weight  [x]  Other: Kidney protection     Recommendations: 1. Try to have something to eat within 2 hours of waking up. 2. Finish eating 2 hours prior to going to bed. 3. Educated pt on all carbohydrates found in foods and encouraged no more than 30-45 gm/meal and 10-20 gm/snack. 4. Start to gradually substitute water and Crystal Light for soda & sweet tea. 5. Test blood sugar before mealtime and 2 hours after that meal at least once per day. If blood sugar is high, write down what you ate prior to testing. Information Reviewed with: pt   Potential Barriers to Learning: none     Dietitian Signature: Claus Ryder MS, RD Date: 8/2/2017   Follow-up: Tues.  10/3/17 at 9am Time: 5:19 PM

## 2017-10-03 ENCOUNTER — APPOINTMENT (OUTPATIENT)
Dept: NUTRITION | Age: 67
End: 2017-10-03

## 2017-10-27 ENCOUNTER — HOSPITAL ENCOUNTER (OUTPATIENT)
Dept: VASCULAR SURGERY | Age: 67
Discharge: HOME | End: 2017-10-27
Attending: PSYCHIATRY & NEUROLOGY
Payer: MEDICARE

## 2017-10-27 ENCOUNTER — HOSPITAL ENCOUNTER (OUTPATIENT)
Age: 67
Discharge: HOME | End: 2017-10-27
Attending: PSYCHIATRY & NEUROLOGY
Payer: MEDICARE

## 2017-10-27 DIAGNOSIS — I63.019 CEREBROVASCULAR ACCIDENT (CVA) DUE TO THROMBOSIS OF VERTEBRAL ARTERY (HCC): ICD-10-CM

## 2017-10-27 DIAGNOSIS — G25.0 ESSENTIAL TREMOR: ICD-10-CM

## 2017-10-27 PROCEDURE — 70551 MRI BRAIN STEM W/O DYE: CPT

## 2017-10-27 PROCEDURE — 93880 EXTRACRANIAL BILAT STUDY: CPT

## 2017-10-27 NOTE — PROCEDURES
[facility]  *** FINAL REPORT ***    Name: Christina Miguel  MRN: [id number]    Outpatient  : 29 Aug 1950  HIS Order #: [id number]  [address] [facility] Visit #: [id number]  Date: 27 Oct 2017    TYPE OF TEST: Cerebrovascular Duplex    REASON FOR TEST    Right Carotid:-             Proximal               Mid                 Distal  cm/s  Systolic  Diastolic  Systolic  Diastolic  Systolic  Diastolic  CCA:     51.9      10.0       73.0      12.0       73.0      12.0  Bulb:  ECA:    192.0  ICA:    124.0      27.0       96.0      20.0       73.0      17.0  ICA/CCA:  1.6       2.7    ICA Stenosis: <50%    Right Vertebral:-  Finding: Resistant  Sys:       41.0  Geraldine:    Right Subclavian: Normal    Left Carotid:-            Proximal                Mid                 Distal  cm/s  Systolic  Diastolic  Systolic  Diastolic  Systolic  Diastolic  CCA:    592.7      19.0       76.0      13.0       87.0      16.0  Bulb:  ECA:    159.0  ICA:    120.0      19.0       63.0      18.0       89.0      20.0  ICA/CCA:  1.1       1.0    ICA Stenosis: <50%    Left Vertebral:-  Finding: Antegrade  Sys:       49.0  Geraldine:       22.0    Left Subclavian: Normal    INTERPRETATION/FINDINGS  Duplex images were obtained using 2-D gray scale, color flow, and  spectral Doppler analysis. 1. Bilateral <50% stenosis of the internal carotid arteries. 2. No significant stenosis in the external carotid arteries  bilaterally. 3. 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 4. Antegrade flow in the left vertebral artery. 5. Normal flow in both subclavian arteries. Plaque Morphology:  1. Hyperechoic plaque in the bulb and right ICA. 2. Hyperechoic plaque in the bulb and left ICA. ADDITIONAL COMMENTS    I have personally reviewed the data relevant to the interpretation of  this  study.     TECHNOLOGIST: Agnieszka Cutler, [facility], RVT/  Signed: 10/27/2017 08:45 AM    PHYSICIAN: Christal STACIA Ibrahim   Signed: 10/27/2017 10:14 AM

## 2017-11-29 PROBLEM — G45.9 TIA (TRANSIENT ISCHEMIC ATTACK): Status: ACTIVE | Noted: 2017-11-29

## 2017-12-04 ENCOUNTER — PATIENT OUTREACH (OUTPATIENT)
Dept: INTERNAL MEDICINE CLINIC | Age: 67
End: 2017-12-04

## 2017-12-04 NOTE — PROGRESS NOTES
NNTOCIP Contact made: within 2 business days post discharge    Patient admitted to [facility] on 11/29/2017 to 12/1/2017, for TIA. Patient verified two patient identifiers. Introduced self, role and reason for call. Patient out eating breakfast, unable to review home medications at this, will bring to follow up appointment. Patient presenting symptoms:   Fall  Blurred vision  Dizziness  Bilateral lower extremity numbness     Patient denies:  Pain  SOB  Dizziness  Lightheadedness  Numbness  Tingling  Blurred vision  Vision changes  Fever  Chills  Speech changes  Weakness   Falls     Patient reports:  Denies any symptoms at this time  Out eating breakfast, will check blood sure afterwards, normally check fasting  Neurology follow up in January unsure of date and time      Barriers:   Financial: None identified at this time   Patients comprehension of disease: Patient verbalizes understanding of discharge plan and special follow up. Transportation: Self    Resources:  Spouse  Family     DME:  None    ADL's:  Patient is independent of all ADL's. Patient reported the following on ADL's:  Feeds self: YES  Ambulates: YES      Self-grooming: YES  Toileting: YES    Plan of care:  1. Take medications as prescribed  2. Attend all follow up appointments  3. Fall risk    Goals Addressed      Attends follow-up appointments as directed. Will attend all follow up appointments       Prevent complications post hospitalization. No admission within 30 days post discharge       Returns to baseline activity level. (pt-stated)                  To be fall free          Adherence to previous treatment and likelihood for follow up:  Patient verbalizes understanding of discharge plan and special follow up.     Appointments:  RENATA CHAUDHRY [id number] 12/7/2017 at 1430 with Dr. Melany Faith, January 2018 (unsure of date and time)    New medications prescribed:  None    Advance Care Planning:  Not on file    Utilization in the past 12 months:  1 hospitalization and 0 ed    RRAT score:   Not available, moderate risk     Patient verbalizes understanding self-management of medications, and when to seek medical attention from PCP. Patient instructed to bring all medications with them to their next appointment. Patient was given the opportunity to ask questions. Contact information was provided for future reference or further questions.

## 2017-12-07 ENCOUNTER — PATIENT OUTREACH (OUTPATIENT)
Dept: INTERNAL MEDICINE CLINIC | Age: 67
End: 2017-12-07

## 2017-12-07 ENCOUNTER — OFFICE VISIT (OUTPATIENT)
Dept: INTERNAL MEDICINE CLINIC | Age: 67
End: 2017-12-07

## 2017-12-07 VITALS
HEART RATE: 73 BPM | HEIGHT: 63 IN | SYSTOLIC BLOOD PRESSURE: 132 MMHG | DIASTOLIC BLOOD PRESSURE: 80 MMHG | RESPIRATION RATE: 14 BRPM | BODY MASS INDEX: 28.46 KG/M2 | OXYGEN SATURATION: 96 % | WEIGHT: 160.6 LBS | TEMPERATURE: 98.2 F

## 2017-12-07 DIAGNOSIS — E78.5 DYSLIPIDEMIA: ICD-10-CM

## 2017-12-07 DIAGNOSIS — G45.0 VERTEBROBASILAR TIAS: Primary | ICD-10-CM

## 2017-12-07 DIAGNOSIS — I10 ESSENTIAL HYPERTENSION: ICD-10-CM

## 2017-12-07 NOTE — PROGRESS NOTES
Goals Addressed             Most Recent     COMPLETED: Attends follow-up appointments as directed. On track (12/7/2017)             Will attend all follow up appointments   Patient attended his/er post discharge follow up appointment with Dr. Dwayne Vale as scheduled.

## 2017-12-07 NOTE — PROGRESS NOTES
1. Have you been to the ER, urgent care clinic or hospitalized since your last visit? YES. 11/29/17-12/1/17 [facility]      2. Have you seen or consulted any other health care providers outside of the [address] [place] [address] since your last visit (Include any pap smears or colon screening)? YES Neurologist in [facility] you have an Advanced Directive? NO    Would you like information on Advanced Directives?  NO

## 2017-12-07 NOTE — MR AVS SNAPSHOT
Visit Information Date & Time Provider Department Dept. Phone Encounter #  
 12/7/2017  2:15 PM Antionette Toscano MD [facility] [address] [place] [address] [phone] Your Appointments 1/2/2018  8:00 AM  
LAB with [facility] NURSE VISIT [facility] [address] [place] [address] ([facility] Appt Note: lab  
 [address], [address] [facility] [address] [place]  
  
   
 [address], [address]  
  
    
 1/9/2018  3:00 PM  
PHYSICAL with Antionette Toscano MD  
[facility] [address] [place] [address] [facility] Appt Note: [address]   
 [address] [facility], [address] [id number] [address] [place]  
  
   
 [address], [address] Upcoming Health Maintenance Date Due MICROALBUMIN Q1 12/20/2017 HEMOGLOBIN A1C Q6M 5/29/2018 FOOT EXAM Q1 6/27/2018 MEDICARE YEARLY EXAM 6/28/2018 EYE EXAM RETINAL OR DILATED Q1 11/8/2018 LIPID PANEL Q1 11/30/2018 BREAST CANCER SCRN MAMMOGRAM 12/30/2018 GLAUCOMA SCREENING Q2Y 11/8/2019 DTaP/Tdap/Td series (2 - Td) 2/28/2022 COLONOSCOPY 12/2/2023 Allergies as of 12/7/2017  Review Complete On: 12/7/2017 By: Raghavendra Mooney No Known Allergies Current Immunizations  Reviewed on 11/6/2017 Name Date Influenza High Dose Vaccine PF 12/27/2016 Influenza Vaccine 11/3/2017, 11/24/2015 Influenza Vaccine PF 12/18/2014 11:25 AM  
 Influenza Vaccine Whole 12/7/2012, 10/1/2009 Pneumococcal Conjugate (PCV-13) 12/29/2015  9:50 AM  
 Pneumococcal Polysaccharide (PPSV-23) 6/27/2017  8:46 AM  
 TDAP Vaccine 2/29/2012 ZZZ-RETIRED (DO NOT USE) Pneumococcal Vaccine (Unspecified Type) 2/29/2012 Zoster 6/21/2012 Not reviewed this visit Vitals BP Pulse Temp Resp Height(growth percentile) Weight(growth percentile) 132/80 73 98.2 °F (36.8 °C) 14 5' 3\" (1.6 m) 160 lb 9.6 oz (72.8 kg) SpO2 BMI OB Status Smoking Status 96% 28.45 kg/m2 Postmenopausal Passive Smoke Exposure - Never Smoker Vitals History BMI and BSA Data Body Mass Index Body Surface Area  
 28.45 kg/m 2 1.8 m 2 Preferred Pharmacy [facility] Name Phone [id number] [address] [phone] Your Updated Medication List  
  
   
This list is accurate as of: 12/7/17  3:19 PM.  Always use your most recent med list.  
  
  
  
  
 allopurinol 100 mg tablet Commonly known as:  Roby Minor Take  by mouth daily. aspirin delayed-release 81 mg tablet Take 1 Tab by mouth daily for 30 days. atorvastatin 20 mg tablet Commonly known as:  LIPITOR Take  by mouth daily. BENICAR HCT 40-25 mg per tablet Generic drug:  olmesartan-hydroCHLOROthiazide Take 1 Tab by mouth daily. COREG 12.5 mg tablet Generic drug:  carvedilol Take 25 mg by mouth daily. insulin lispro protamine/insulin lispro 100 unit/mL (75-25) injection Commonly known as:  HUMALOG MIX 75/25  
10 Units by SubCUTAneous route two (2) times a day. Indications: type 2 diabetes mellitus INVOKANA 100 mg tablet Generic drug:  canagliflozin Take 300 mg by mouth daily. Indications: TYPE 2 DIABETES MELLITUS  
  
 metFORMIN 1,000 mg tablet Commonly known as:  GLUCOPHAGE Take 1,000 mg by mouth two (2) times daily (with meals). NORVASC 10 mg tablet Generic drug:  amLODIPine Take  by mouth daily. VITAMIN D 5,000 unit Tab tablet Generic drug:  cholecalciferol (VITAMIN D3) Take 2,000 Units by mouth. Takes 12 units am and 14 units pm  
  
  
  
  
Introducing [employer]! [employer] introduces [employer] patient portal. Now you can access parts of your medical record, email your doctor's office, and request medication refills online. 1. In your internet browser, go to [email or web address] 2. Click on the First Time User? Click Here link in the Sign In box.  You will see the New Member Sign Up page. 3. Enter your [employer] Access Code exactly as it appears below. You will not need to use this code after youve completed the sign-up process. If you do not sign up before the expiration date, you must request a new code. · [employer] Access Code: [email or web address] Expires: 1/24/2018  7:45 AM 
 
4. Enter the last four digits of your Social Security Number (xxxx) and Date of Birth (mm/dd/yyyy) as indicated and click Submit. You will be taken to the next sign-up page. 5. Create a [employer] ID. This will be your [employer] login ID and cannot be changed, so think of one that is secure and easy to remember. 6. Create a [employer] password. You can change your password at any time. 7. Enter your Password Reset Question and Answer. This can be used at a later time if you forget your password. 8. Enter your e-mail address. You will receive e-mail notification when new information is available in [address]. 9. Click Sign Up. You can now view and download portions of your medical record. 10. Click the Download Summary menu link to download a portable copy of your medical information. If you have questions, please visit the Frequently Asked Questions section of the [employer] website. Remember, Re[employer] is NOT to be used for urgent needs. For medical emergencies, dial 911. Now available from your iPhone and Android! Please provide this summary of care documentation to your next provider. Your primary care clinician is listed as Drinda Epley. If you have any questions after today's visit, please call [phone].

## 2017-12-08 NOTE — PROGRESS NOTES
Patient being seen today for transitional care management    Patient was admitted to [address] [place] [address] from 11/29/17 to 12/1/17           Initial interactive contact was done by nurse navigator Sharifa Hannah    I thoroughly reviewed the discharge summary, notes, consults, labs and imaging studies in the electronic record. Pertinent details are summarized below and the record has been updated to reflect recent events    She apparently was referred to Dr Burt Newton by Dr Vitor Love in Oct when she reported diplopia. Dr Burt Newton ordered mri and carotids but she was never called the results. On 11/27/17, she was with her grandkids when she had an episode of being 'unable to move both her legs when she tried to stand up' which resulted in a fall. She was on the ground a few minutes but eventually everything resolved within a few minutes. No loc, presyncope, dizziness/lightheadedness, she was alert, no incontinence, tongue trauma. No prior h/o seizures or syncope. No cp, sob, edema, pleurisy. Two days later, she decided to present to [address] [place] [address] ER and was admitted. She was seen by neuro. EEG neg, CTA head/neck neg, echo neg, Dr Isrrael Avila was not able to give her specific dx outside of possible vertebrobasilar tia but suggested starting asa and f/u w Dr Burt Newton for further opinion. She has appt sometime in January.   No more sx since dc    She continues to see NP Margarie Primrose    Past Medical History:   Diagnosis Date    Calculus of kidney 6/16    Dr David Turner; right ureteral stone s/p EWSL    Diabetes ([facility] [address]     Dr Diehl Hands GERD (gastroesophageal reflux disease)     Hyperlipidemia     Hypertension     Hypovitaminosis D      Past Surgical History:   Procedure Laterality Date    ECHO 2D ADULT  3/12    conc lvh, ef 60%, DD    HX BREAST BIOPSY  2009    negative    HX COLONOSCOPY  12/13    negative Dr King Sos t score 1.10 spine, 1.10 hip (11/13)    NEUROLOGICAL PROCEDURE UNLISTED  11/2017    mri showed mod periventricular wm changes    STRESS TEST THALLIUM STUDY  3/12    negative w EF 70%    VASCULAR SURGERY PROCEDURE UNLIST  11/2017    carotids showed bilat <50% ICA, right vertebral art obst    VASCULAR SURGERY PROCEDURE UNLIST  12/2017    CTA head/neck showed no aneurysm, <50% right intracranial ICA, diminutive right vertebral art     Social History     Social History    Marital status:      Spouse name: N/A    Number of children: N/A    Years of education: N/A     Occupational History    retired teacher [employer]      Social History Main Topics    Smoking status: Passive Smoke Exposure - Never Smoker    Smokeless tobacco: Never Used    Alcohol use 1.2 oz/week     1 Glasses of wine, 1 Cans of beer, 0 Standard drinks or equivalent per week      Comment: social glass once a year    Drug use: No    Sexual activity: Not on file     Other Topics Concern    Not on file     Social History Narrative     Current Outpatient Prescriptions   Medication Sig    aspirin delayed-release 81 mg tablet Take 1 Tab by mouth daily for 30 days.  allopurinol (ZYLOPRIM) 100 mg tablet Take  by mouth daily.  insulin lispro protamine/insulin lispro (HUMALOG MIX 75/25) 100 unit/mL (75-25) injection 10 Units by SubCUTAneous route two (2) times a day. Indications: type 2 diabetes mellitus    canagliflozin (INVOKANA) 100 mg tab Take 300 mg by mouth daily. Indications: TYPE 2 DIABETES MELLITUS    carvedilol (COREG) 12.5 mg tablet Take 25 mg by mouth daily.  atorvastatin (LIPITOR) 20 mg tablet Take  by mouth daily.  metFORMIN (GLUCOPHAGE) 1,000 mg tablet Take 1,000 mg by mouth two (2) times daily (with meals).  amLODIPine (NORVASC) 10 mg tablet Take  by mouth daily.  olmesartan-hydrochlorothiazide (BENICAR HCT) 40-25 mg per tablet Take 1 Tab by mouth daily.  Cholecalciferol, Vitamin D3, (VITAMIN D) 5,000 unit Tab Take 2,000 Units by mouth.  Takes 12 units am and 14 units pm     No current facility-administered medications for this visit. No Known Allergies    Visit Vitals    /80    Pulse 73    Temp 98.2 °F (36.8 °C)    Resp 14    Ht 5' 3\" (1.6 m)    Wt 160 lb 9.6 oz (72.8 kg)    SpO2 96%    BMI 28.45 kg/m2     A&O x3  Affect is appropriate. Mood stable  No apparent distress  Anicteric, no JVD, adenopathy or thyromegaly. No carotid bruits or radiated murmur  Lungs clear to auscultation, no wheezes or rales  Heart showed regular rate and rhythm. No murmur, rubs, gallops  Abdomen soft nontender, no hepatosplenomegaly or masses. Extremities without edema. Pulses 1-2+ symmetrically    Patient Active Problem List   Diagnosis Code    Hypovitaminosis D E55.9    Dyslipidemia E78.5    Essential hypertension I10    GERD without esophagitis K21.9    Advance directive discussed with patient Z70.80    Nephrolithiasis s/p EWSL Dr Graham Estimable 6/16 N20.0    Uncontrolled type 2 diabetes mellitus without complication, with long-term current use of insulin NP Wilhemena Loss E11.65, Z79.4     Assessment and plan:  1. BLE weakness, transient, etiology unclear, possible vertebrobasilar tia? F/U Dr Myriam Arredondo for further recs, cont asa  2. Diplopia. Per neuro above, f/u Dr Maxx Desai also  3. DM. Per NP Wilhemena Loss  4. HLP. Reiterated lipitor      Above conditions discussed at length and patient vocalized understanding.   All questions answered to patient satisfaction

## 2018-01-02 ENCOUNTER — HOSPITAL ENCOUNTER (OUTPATIENT)
Dept: LAB | Age: 68
Discharge: HOME | End: 2018-01-02
Payer: MEDICARE

## 2018-01-02 ENCOUNTER — LAB ONLY (OUTPATIENT)
Dept: INTERNAL MEDICINE CLINIC | Age: 68
End: 2018-01-02

## 2018-01-02 DIAGNOSIS — I10 ESSENTIAL HYPERTENSION: ICD-10-CM

## 2018-01-02 DIAGNOSIS — E55.9 HYPOVITAMINOSIS D: Primary | ICD-10-CM

## 2018-01-02 DIAGNOSIS — E55.9 HYPOVITAMINOSIS D: ICD-10-CM

## 2018-01-02 LAB
ALBUMIN SERPL-MCNC: 3.9 G/DL (ref 3.4–5)
ALBUMIN/GLOB SERPL: 1.1 {RATIO} (ref 0.8–1.7)
ALP SERPL-CCNC: 111 U/L (ref 45–117)
ALT SERPL-CCNC: 19 U/L (ref 13–56)
ANION GAP SERPL CALC-SCNC: 9 MMOL/L (ref 3–18)
AST SERPL-CCNC: 11 U/L (ref 15–37)
BILIRUB SERPL-MCNC: 0.2 MG/DL (ref 0.2–1)
BUN SERPL-MCNC: 18 MG/DL (ref 7–18)
BUN/CREAT SERPL: 17 (ref 12–20)
CALCIUM SERPL-MCNC: 9.4 MG/DL (ref 8.5–10.1)
CHLORIDE SERPL-SCNC: 105 MMOL/L (ref 100–108)
CO2 SERPL-SCNC: 28 MMOL/L (ref 21–32)
CREAT SERPL-MCNC: 1.08 MG/DL (ref 0.6–1.3)
GLOBULIN SER CALC-MCNC: 3.4 G/DL (ref 2–4)
GLUCOSE SERPL-MCNC: 154 MG/DL (ref 74–99)
HBA1C MFR BLD: 8 % (ref 4.2–5.6)
POTASSIUM SERPL-SCNC: 4.6 MMOL/L (ref 3.5–5.5)
PROT SERPL-MCNC: 7.3 G/DL (ref 6.4–8.2)
SODIUM SERPL-SCNC: 142 MMOL/L (ref 136–145)

## 2018-01-02 PROCEDURE — 82043 UR ALBUMIN QUANTITATIVE: CPT | Performed by: INTERNAL MEDICINE

## 2018-01-02 PROCEDURE — 36415 COLL VENOUS BLD VENIPUNCTURE: CPT | Performed by: INTERNAL MEDICINE

## 2018-01-02 PROCEDURE — 80053 COMPREHEN METABOLIC PANEL: CPT | Performed by: INTERNAL MEDICINE

## 2018-01-02 PROCEDURE — 82306 VITAMIN D 25 HYDROXY: CPT | Performed by: INTERNAL MEDICINE

## 2018-01-02 PROCEDURE — 83036 HEMOGLOBIN GLYCOSYLATED A1C: CPT | Performed by: INTERNAL MEDICINE

## 2018-01-03 ENCOUNTER — PATIENT OUTREACH (OUTPATIENT)
Dept: INTERNAL MEDICINE CLINIC | Age: 68
End: 2018-01-03

## 2018-01-03 LAB
25(OH)D3 SERPL-MCNC: 40.7 NG/ML (ref 30–100)
CREAT UR-MCNC: 102.53 MG/DL (ref 30–125)
MICROALBUMIN UR-MCNC: 1.6 MG/DL (ref 0–3)
MICROALBUMIN/CREAT UR-RTO: 16 MG/G (ref 0–30)

## 2018-01-03 NOTE — PROGRESS NOTES
Goals Addressed             Most Recent     COMPLETED: Prevent complications post hospitalization. On track (1/3/2018)             No admission within 30 days post discharge   Episode closed: no hospitalization or ED admission post 30 days from discharge of 12/1/2017.         COMPLETED: Returns to baseline activity level. (pt-stated)   On track (1/3/2018)             To be fall free

## 2018-01-09 ENCOUNTER — OFFICE VISIT (OUTPATIENT)
Dept: INTERNAL MEDICINE CLINIC | Age: 68
End: 2018-01-09

## 2018-01-11 NOTE — PROGRESS NOTES
79 y.o. BLACK OR  female who presents for evaluation. Since her eval at [facility], she seems to be doing well although she continues to have intermittent episodes of her left are 'not feeling right'. She saw Dr Christa Mccarthy but no new recs, she has upcoming appt in the near future     She continues to see Alondra Patel. Denies polyuria, polydipsia, nocturia, vision change. She will see them tomorrow apparently    No new GI or Gu complaints.       LAST MEDICARE WELLNESS EXAM: 12/29/15, 6/27/17   ACP 6/27/17    Past Medical History:   Diagnosis Date    Calculus of kidney 6/16    Dr Robert Belcher; right ureteral stone s/p EWSL    Diabetes ([facility] [address]     Dr Jesusita Purvis GERD (gastroesophageal reflux disease)     Hyperlipidemia     Hypertension     Hypovitaminosis D      Past Surgical History:   Procedure Laterality Date    ECHO 2D ADULT      conc lvh, ef 60%, DD (3/12); nl lv, ef 68%, conc lvh, no wma, mild dd, no valvular abn, neg bubble study (11/17 - [facility])    HX BREAST BIOPSY  2009    negative    HX COLONOSCOPY  12/13    negative Dr Adam Khan t score 1.10 spine, 1.10 hip (11/13)    NEUROLOGICAL PROCEDURE UNLISTED  11/2017    mri showed mod periventricular wm changes    STRESS TEST THALLIUM STUDY  3/12    negative w EF 70%    VASCULAR SURGERY PROCEDURE UNLIST  11/2017    carotids showed bilat <50% ICA, right vertebral art obst    VASCULAR SURGERY PROCEDURE UNLIST  12/2017    CTA head/neck showed no aneurysm, <50% right intracranial ICA, diminutive right vertebral art     Social History     Social History    Marital status:      Spouse name: N/A    Number of children: N/A    Years of education: N/A     Occupational History    retired teacher [place]      Social History Main Topics    Smoking status: Passive Smoke Exposure - Never Smoker    Smokeless tobacco: Never Used    Alcohol use 1.2 oz/week     1 Glasses of wine, 1 Cans of beer, 0 Standard drinks or equivalent per week      Comment: social glass once a year    Drug use: No    Sexual activity: Not on file     Other Topics Concern    Not on file     Social History Narrative     No Known Allergies    Current Outpatient Prescriptions   Medication Sig    aspirin 81 mg chewable tablet Take 81 mg by mouth daily.  insulin lispro protamine/insulin lispro (HUMALOG MIX 75/25) 100 unit/mL (75-25) injection 10 Units by SubCUTAneous route two (2) times a day. Indications: type 2 diabetes mellitus    canagliflozin (INVOKANA) 100 mg tab Take 300 mg by mouth daily. Indications: TYPE 2 DIABETES MELLITUS    carvedilol (COREG) 12.5 mg tablet Take 25 mg by mouth daily.  atorvastatin (LIPITOR) 20 mg tablet Take  by mouth daily.  metFORMIN (GLUCOPHAGE) 1,000 mg tablet Take 1,000 mg by mouth two (2) times daily (with meals).  amLODIPine (NORVASC) 10 mg tablet Take  by mouth daily.  olmesartan-hydrochlorothiazide (BENICAR HCT) 40-25 mg per tablet Take 1 Tab by mouth daily.  Cholecalciferol, Vitamin D3, (VITAMIN D) 5,000 unit Tab Take 2,000 Units by mouth. Takes 12 units am and 14 units pm    allopurinol (ZYLOPRIM) 100 mg tablet Take  by mouth daily. No current facility-administered medications for this visit.       REVIEW OF SYSTEMS: gyn>5 yrs, mammo 8/16, DEXA 11/13, sees Dr Eldon Heredia, no podiatry, colo 12/13 Dr Yuriy Ojeda  Ophtho - no vision change or eye pain  Oral - no mouth pain, tongue or tooth problems  Ears - no hearing loss, ear pain, fullness, no swallowing problems  Cardiac - no CP, PND, orthopnea, edema, palpitations or syncope  Chest - no breast masses  Resp - no wheezing, chronic coughing, dyspnea  GI - no heartburn, nausea, vomiting, change in bowel habits, bleeding, hemorrhoids  Urinary - no dysuria, hematuria, flank pain, urgency, frequency  Genitals - no genital lesions, discharge, masses, ulceration, warts  Ortho - no swelling, dec ROM, myalgias  Derm - no nail abnormalities, rashes, lesions of note, hair loss  Psych - denies any anxiety or depression symptoms, no hallucinations or violent ideation  Constitutional - no wt loss, night sweats, unexplained fevers  Neuro - no focal weakness, numbness, paresthesias, incoordination, ataxia, involuntary movements  Endo - no polyuria, polydipsia, nocturia, hot flashes    Visit Vitals    /78 (BP 1 Location: Left arm, BP Patient Position: Sitting)    Pulse 70    Temp 98.2 °F (36.8 °C) (Oral)    Resp 14    Ht 5' 3\" (1.6 m)    Wt 160 lb (72.6 kg)    SpO2 98%    BMI 28.34 kg/m2     A&O x3  Affect is appropriate. Mood stable  No apparent distress  Anicteric, no JVD, adenopathy or thyromegaly. No carotid bruits or radiated murmur  Lungs clear to auscultation, no wheezes or rales  Heart showed regular rate and rhythm. No murmur, rubs, gallops  Abdomen soft nontender, no hepatosplenomegaly or masses.    Ext without c/c/e    LABS  From 2/12 showed   gluc 330, cr 0.93, gfr 77,  alt 30, hba1c 11.2,                   chol 199, tg 366, hdl 35, ldl-c 67, wbc 7.1, hb 15.2, plt 273, tsh 2.20  From 6/12 showed                   hba1c 8.2,   ldl-p 1018, chol 112, tg 95,   hdl 32, ldl-c 61, umar 1.5  From 12/12 showed                   hba1c 8.9,   ldl-p 1486, chol 132, tg 109, hdl 37, ldl-c 73  From 6/13 showed   gluc 128, cr 0.91, gfr 68,  alt 13, hba1c 7.8,                  chol 115, tg 157, hdl 28, ldl-c 56  From 12/13 showed                   hba1c 7.5,                wbc 7.5, hb 13.2, plt 297, vit d 69.2  From 6/14 showed   gluc 171, cr 0.85, gfr>60, alt<5,        chol 134, tg 165, hdl 26, ldl-c 73  From 12/14 showed        hba1c 9.2,       chol 151, tg 256, hdl 33, ldl-c 67, umar 4.0  From 3/15 showed       hba1c 7.3,              ldl-c 82, umar neg  From 7/15 showed   gluc 130, cr 1.16, gfr 49,  alt 15, hba1c 7.5,       chol 128, tg 271, hdl 29, ldl-c 45  From 12/15 showed        hba1c 9.6,        chol 160, tg 218, hdl 33, ldl-c 83, umar 7.0  From 2/16 showed gluc 117, cr 1.15, gfr 58,  alt 18, hba1c 7.9,       chol 112, tg 184, hdl 30, ldl-c 45, umar 4.3, vit d 45.3, uric 7.2  From 6/16 showed   gluc 98      hba1c 7.7,       chol 143, tg 178, hdl 31, ldl-c 76  From 12/16 showed gluc 170, cr 1.11, gfr 60,    hba1c 8.9,                 umar 4    Results for orders placed or performed during the hospital encounter of 01/02/18   VITAMIN D, 25 HYDROXY   Result Value Ref Range    Vitamin D 25-Hydroxy 40.7 30 - 100 ng/mL   HEMOGLOBIN A1C W/O EAG   Result Value Ref Range    Hemoglobin A1c 8.0 (H) 4.2 - 5.6 %   METABOLIC PANEL, COMPREHENSIVE   Result Value Ref Range    Sodium 142 136 - 145 mmol/L    Potassium 4.6 3.5 - 5.5 mmol/L    Chloride 105 100 - 108 mmol/L    CO2 28 21 - 32 mmol/L    Anion gap 9 3.0 - 18 mmol/L    Glucose 154 (H) 74 - 99 mg/dL    BUN 18 7.0 - 18 MG/DL    Creatinine 1.08 0.6 - 1.3 MG/DL    BUN/Creatinine ratio 17 12 - 20      GFR est AA >60 >60 ml/min/1.73m2    GFR est non-AA 51 (L) >60 ml/min/1.73m2    Calcium 9.4 8.5 - 10.1 MG/DL    Bilirubin, total 0.2 0.2 - 1.0 MG/DL    ALT (SGPT) 19 13 - 56 U/L    AST (SGOT) 11 (L) 15 - 37 U/L    Alk. phosphatase 111 45 - 117 U/L    Protein, total 7.3 6.4 - 8.2 g/dL    Albumin 3.9 3.4 - 5.0 g/dL    Globulin 3.4 2.0 - 4.0 g/dL    A-G Ratio 1.1 0.8 - 1.7     MICROALBUMIN, UR, RAND W/ MICROALBUMIN/CREA RATIO   Result Value Ref Range    Microalbumin,urine random 1.60 0 - 3.0 MG/DL    Creatinine, urine 102.53 30 - 125 mg/dL    Microalbumin/Creat ratio (mg/g creat) 16 0 - 30 mg/g     Patient Active Problem List   Diagnosis Code    Hypovitaminosis D E55.9    Dyslipidemia E78.5    Essential hypertension I10    GERD without esophagitis K21.9    Advance directive discussed with patient Z70.80    Nephrolithiasis s/p EWSL Dr Ramona Welch 6/16 N20.0    Uncontrolled type 2 diabetes mellitus without complication, with long-term current use of insulin NP [address] E11.65, Z79.4     Assessment and plan:  1. Diabetes.  F/u w [facility] Keys, Dr Lex Alvarado, no podiatry at this time. 2.  Dyslipidemia. Continue current   3. Hypertension. Continue current regimen   4. GERD. PPI and avoidance measures  5. Hypovit d. Doing well   6. Neurology. Possible TIAs? Will inc asa to 161 and await official recs Dr Matthias Avina  7. Nephrolithiasis. F/U Dr Drake Mckeon      RTC 5/18      Above conditions discussed at length and patient vocalized understanding.   All questions answered to patient satifaction

## 2018-01-15 ENCOUNTER — OFFICE VISIT (OUTPATIENT)
Dept: INTERNAL MEDICINE CLINIC | Age: 68
End: 2018-01-15

## 2018-01-15 VITALS
RESPIRATION RATE: 14 BRPM | SYSTOLIC BLOOD PRESSURE: 130 MMHG | TEMPERATURE: 98.2 F | WEIGHT: 160 LBS | DIASTOLIC BLOOD PRESSURE: 78 MMHG | BODY MASS INDEX: 28.35 KG/M2 | HEART RATE: 70 BPM | OXYGEN SATURATION: 98 % | HEIGHT: 63 IN

## 2018-01-15 DIAGNOSIS — N20.0 NEPHROLITHIASIS: ICD-10-CM

## 2018-01-15 DIAGNOSIS — K21.9 GERD WITHOUT ESOPHAGITIS: ICD-10-CM

## 2018-01-15 DIAGNOSIS — I10 ESSENTIAL HYPERTENSION: ICD-10-CM

## 2018-01-15 DIAGNOSIS — E78.5 DYSLIPIDEMIA: ICD-10-CM

## 2018-01-15 DIAGNOSIS — G45.9 TRANSIENT CEREBRAL ISCHEMIA, UNSPECIFIED TYPE: ICD-10-CM

## 2018-01-15 RX ORDER — GUAIFENESIN 100 MG/5ML
81 LIQUID (ML) ORAL DAILY
COMMUNITY
End: 2019-03-12

## 2018-01-15 NOTE — PROGRESS NOTES
1. Have you been to the ER, urgent care clinic or hospitalized since your last visit? NO.     2. Have you seen or consulted any other health care providers outside of the [address] [place] [address] since your last visit (Include any pap smears or colon screening)? NO      Do you have an Advanced Directive? NO    Would you like information on Advanced Directives?  NO

## 2018-01-15 NOTE — MR AVS SNAPSHOT
[facility] 
 
 
 [address], [address] [place] [address] [facility] 
[phone] Patient: Chris Fong MRN: [id number] SYN:9/50/8250 Visit Information Date & Time Provider Department Dept. Phone Encounter #  
 1/15/2018  1:00 PM Quan Young MD [facility] [address] [place] [address] [phone] Your Appointments 7/16/2018  9:00 AM  
Nurse Visit with Quan Young MD  
[facility] [address] [place] [address] [address] [facility]) Appt Note: 6 mo f/u  
 [address] [facility], [address] [place]  
  
   
 [address], [address] Upcoming Health Maintenance Date Due  
 FOOT EXAM Q1 6/27/2018 MEDICARE YEARLY EXAM 6/28/2018 HEMOGLOBIN A1C Q6M 7/2/2018 EYE EXAM RETINAL OR DILATED Q1 11/8/2018 LIPID PANEL Q1 11/30/2018 BREAST CANCER SCRN MAMMOGRAM 12/30/2018 MICROALBUMIN Q1 1/2/2019 GLAUCOMA SCREENING Q2Y 11/8/2019 DTaP/Tdap/Td series (2 - Td) 2/28/2022 COLONOSCOPY 12/2/2023 Allergies as of 1/15/2018  Review Complete On: 1/15/2018 By: Roberta Womack No Known Allergies Current Immunizations  Reviewed on 11/6/2017 Name Date Influenza High Dose Vaccine PF 12/27/2016 Influenza Vaccine 11/3/2017, 11/24/2015 Influenza Vaccine PF 12/18/2014 11:25 AM  
 Influenza Vaccine Whole 12/7/2012, 10/1/2009 Pneumococcal Conjugate (PCV-13) 12/29/2015  9:50 AM  
 Pneumococcal Polysaccharide (PPSV-23) 6/27/2017  8:46 AM  
 TDAP Vaccine 2/29/2012 ZZZ-RETIRED (DO NOT USE) Pneumococcal Vaccine (Unspecified Type) 2/29/2012 Zoster 6/21/2012 Not reviewed this visit Vitals BP Pulse Temp Resp Height(growth percentile) Weight(growth percentile) 130/78 (BP 1 Location: Left arm, BP Patient Position: Sitting) 70 98.2 °F (36.8 °C) (Oral) 14 5' 3\" (1.6 m) 160 lb (72.6 kg) SpO2 BMI OB Status Smoking Status 98% 28.34 kg/m2 Postmenopausal Passive Smoke Exposure - Never Smoker Vitals History BMI and BSA Data Body Mass Index Body Surface Area  
 28.34 kg/m 2 1.8 m 2 Preferred Pharmacy Pharmacy Name Phone [id number] [place]rier, [address] [phone] Your Updated Medication List  
  
   
This list is accurate as of: 1/15/18  1:42 PM.  Always use your most recent med list.  
  
  
  
  
 allopurinol 100 mg tablet Commonly known as:  Paddy Riki Take  by mouth daily. aspirin 81 mg chewable tablet Take 81 mg by mouth daily. atorvastatin 20 mg tablet Commonly known as:  LIPITOR Take  by mouth daily. BENICAR HCT 40-25 mg per tablet Generic drug:  olmesartan-hydroCHLOROthiazide Take 1 Tab by mouth daily. COREG 12.5 mg tablet Generic drug:  carvedilol Take 25 mg by mouth daily. insulin lispro protamine/insulin lispro 100 unit/mL (75-25) injection Commonly known as:  HUMALOG MIX 75/25  
10 Units by SubCUTAneous route two (2) times a day. Indications: type 2 diabetes mellitus INVOKANA 100 mg tablet Generic drug:  canagliflozin Take 300 mg by mouth daily. Indications: TYPE 2 DIABETES MELLITUS  
  
 metFORMIN 1,000 mg tablet Commonly known as:  GLUCOPHAGE Take 1,000 mg by mouth two (2) times daily (with meals). NORVASC 10 mg tablet Generic drug:  amLODIPine Take  by mouth daily. VITAMIN D 5,000 unit Tab tablet Generic drug:  cholecalciferol (VITAMIN D3) Take 2,000 Units by mouth. Takes 12 units am and 14 units pm  
  
  
  
  
Introducing [employer]! Lisa Collins introduces [employer] patient portal. Now you can access parts of your medical record, email your doctor's office, and request medication refills online. 1. In your internet browser, go to [email or web address] 2. Click on the First Time User? Click Here link in the Sign In box.  You will see the New Member Sign Up page. 3. Enter your METEOR Network Access Code exactly as it appears below. You will not need to use this code after youve completed the sign-up process. If you do not sign up before the expiration date, you must request a new code. · METEOR Network Access Code: [email or web address] Expires: 1/24/2018  7:45 AM 
 
4. Enter the last four digits of your Social Security Number (xxxx) and Date of Birth (mm/dd/yyyy) as indicated and click Submit. You will be taken to the next sign-up page. 5. Create a METEOR Network ID. This will be your METEOR Network login ID and cannot be changed, so think of one that is secure and easy to remember. 6. Create a METEOR Network password. You can change your password at any time. 7. Enter your Password Reset Question and Answer. This can be used at a later time if you forget your password. 8. Enter your e-mail address. You will receive e-mail notification when new information is available in [address] 9. Click Sign Up. You can now view and download portions of your medical record. 10. Click the Download Summary menu link to download a portable copy of your medical information. If you have questions, please visit the Frequently Asked Questions section of the METEOR Network website. Remember, METEOR Network is NOT to be used for urgent needs. For medical emergencies, dial 911. Now available from your iPhone and Android! Please provide this summary of care documentation to your next provider. Your primary care clinician is listed as Shanel Vasquez. If you have any questions after today's visit, please call [phone].

## 2018-01-30 ENCOUNTER — HOSPITAL ENCOUNTER (OUTPATIENT)
Dept: NON INVASIVE DIAGNOSTICS | Age: 68
Discharge: HOME | End: 2018-01-30
Attending: PSYCHIATRY & NEUROLOGY
Payer: MEDICARE

## 2018-01-30 DIAGNOSIS — R55 SYNCOPE: ICD-10-CM

## 2018-01-30 PROCEDURE — 93225 XTRNL ECG REC<48 HRS REC: CPT

## 2018-04-10 PROBLEM — G45.9 TIA (TRANSIENT ISCHEMIC ATTACK): Status: ACTIVE | Noted: 2018-04-10

## 2018-04-12 PROBLEM — I65.29 SYMPTOMATIC CAROTID ARTERY STENOSIS: Status: ACTIVE | Noted: 2018-04-12

## 2018-04-16 ENCOUNTER — PATIENT OUTREACH (OUTPATIENT)
Dept: INTERNAL MEDICINE CLINIC | Age: 68
End: 2018-04-16

## 2018-04-16 NOTE — PROGRESS NOTES
Hospital Discharge Follow-Up      Date/Time:  2018 9:34 AM    Patient was admitted to [facility] on 4/10/2018 and discharged on 2017 for TIA. The physician discharge summary was available at the time of outreach. Patient was contacted within 2 business days of discharge. Top Challenges reviewed with the provider   None       Inpatient RRAT score: 21  Was this a readmission? no   Patient stated reason for the readmission: N/A    Method of communication with provider :none    Nurse Navigator (NN) contacted the patient by telephone to perform post hospital discharge assessment. Verified name and  with patient as identifiers. Provided introduction to self, and explanation of the Nurse Navigator role. Patient reports: All new prescriptions picked up and started  Confirmed follow up appointments with PCP and Neurology  Patient will call Dr. Diana Torres office for a 2 week follow up  Have not checked her blood sugar yet  Will start monitoring her blood pressure    Patient denies:  Slurred speech  Vision changes  Left sided weakness  Fever  Chills  Nausea  Vomiting  Falls  Dizziness  Lightheadedness  Numbness  Tingling     Reviewed discharge instructions and red flags with  patient who verbalized understanding. Patient given an opportunity to ask questions and does not have any further questions or concerns at this time. The patient agrees to contact the PCP office for questions related to their healthcare. NN provided contact information for future reference. Summary of patients top problems:  1. Vertebral and carotid stenosis: Neurology and Surgical follow up, Aspirin, Plavix, and Lopid  2. Diabetes: Monitor blood sugar, Metformin, Humalog 75/25, and Invokana  3.  HTN: Norvasc, Coreg, Benicar, and start monitoring blood pressure    Home Health orders at discharge: [address]: N/A  Date of initial visit: [address] ordered/company: N/A  Durable Medical Equipment received: N/A    Barriers to care? None at this time    Advance Care Planning:   Does patient have an Advance Directive:  not on file     Medication:     New Medications at Discharge: Plavix and Lopid  Changed Medications at Discharge: Increased Lipitor to 40 mg daily  Discontinued Medications at Discharge: None    Medication reconciliation was performed with patient, who verbalizes understanding of administration of home medications. There were no barriers to obtaining medications identified at this time. Referral to Pharm D needed: no     Current Outpatient Prescriptions   Medication Sig    atorvastatin (LIPITOR) 40 mg tablet Take 1 Tab by mouth daily.  clopidogrel (PLAVIX) 75 mg tab Take 1 Tab by mouth daily.  gemfibrozil (LOPID) 600 mg tablet Take 1 Tab by mouth daily.  aspirin 81 mg chewable tablet Take 81 mg by mouth daily.  insulin lispro protamine/insulin lispro (HUMALOG MIX 75/25) 100 unit/mL (75-25) injection 14 Units by SubCUTAneous route two (2) times a day. Indications: type 2 diabetes mellitus    canagliflozin (INVOKANA) 100 mg tab Take 300 mg by mouth daily. Indications: TYPE 2 DIABETES MELLITUS    carvedilol (COREG) 12.5 mg tablet Take 25 mg by mouth daily.  metFORMIN (GLUCOPHAGE) 1,000 mg tablet Take 1,000 mg by mouth two (2) times daily (with meals).  amLODIPine (NORVASC) 10 mg tablet Take  by mouth daily.  olmesartan-hydrochlorothiazide (BENICAR HCT) 40-25 mg per tablet Take 1 Tab by mouth daily.  Cholecalciferol, Vitamin D3, (VITAMIN D) 5,000 unit Tab Take 2,000 Units by mouth. Takes 12 units am and 14 units pm    allopurinol (ZYLOPRIM) 100 mg tablet Take  by mouth daily. No current facility-administered medications for this visit. There are no discontinued medications.     PCP/Specialist follow up:   Future Appointments  Date Time Provider Shanice Sandoval   4/19/2018 11:40  [address], MD [facility] [address]   7/16/2018 9:00 AM MD Iker Ramos 634 Attends follow-up appointments as directed. Will follow up with PCP, Neurology, and Surgeon   PCP, 4/19/2018   Neurology, 4/23/2018   Surgeon, 2 weeks  4/16/2018: patient confirmed appointments and will schedule with Surgeon       Knowledge and adherence of prescribed medication (ie. action, side effects, missed dose, etc.). Medication adherence   Plavix   Lopid  4/16/2018: patient confirmed picking up medication, starting, and understand purpose       Prevent complications post hospitalization.             No admissions within 30 days post discharge, 4/13/2018

## 2018-04-19 ENCOUNTER — OFFICE VISIT (OUTPATIENT)
Dept: INTERNAL MEDICINE CLINIC | Age: 68
End: 2018-04-19

## 2018-04-19 ENCOUNTER — PATIENT OUTREACH (OUTPATIENT)
Dept: INTERNAL MEDICINE CLINIC | Age: 68
End: 2018-04-19

## 2018-04-19 VITALS
OXYGEN SATURATION: 97 % | HEART RATE: 81 BPM | HEIGHT: 63 IN | DIASTOLIC BLOOD PRESSURE: 82 MMHG | SYSTOLIC BLOOD PRESSURE: 137 MMHG | TEMPERATURE: 98.4 F | WEIGHT: 158 LBS | RESPIRATION RATE: 14 BRPM | BODY MASS INDEX: 28 KG/M2

## 2018-04-19 DIAGNOSIS — K21.9 GERD WITHOUT ESOPHAGITIS: ICD-10-CM

## 2018-04-19 DIAGNOSIS — E78.5 DYSLIPIDEMIA: ICD-10-CM

## 2018-04-19 DIAGNOSIS — I10 ESSENTIAL HYPERTENSION: ICD-10-CM

## 2018-04-19 DIAGNOSIS — G45.9 TRANSIENT CEREBRAL ISCHEMIA, UNSPECIFIED TYPE: Primary | ICD-10-CM

## 2018-04-19 DIAGNOSIS — I65.21 SYMPTOMATIC STENOSIS OF RIGHT CAROTID ARTERY: ICD-10-CM

## 2018-04-19 NOTE — MR AVS SNAPSHOT
[facility] 
 
 
 [address], [address] [place] [address] [facility] 
[phone] Patient: Rebeca Massey MRN: [id number] DLP:8/90/8668 Visit Information Date & Time Provider Department Dept. Phone Encounter #  
 4/19/2018 11:40 AM Cate Og MD [facility] [id number] Your Appointments 7/16/2018  9:00 AM  
Nurse Visit with Cate Og MD  
[facility] [address] [facility]) Appt Note: 6 mo f/u  
 [address] [place] [address] [address]  
  
   
 [address], [address] Upcoming Health Maintenance Date Due  
 FOOT EXAM Q1 6/27/2018 MEDICARE YEARLY EXAM 6/28/2018 HEMOGLOBIN A1C Q6M 10/11/2018 EYE EXAM RETINAL OR DILATED Q1 11/8/2018 BREAST CANCER SCRN MAMMOGRAM 12/30/2018 MICROALBUMIN Q1 1/2/2019 LIPID PANEL Q1 4/11/2019 GLAUCOMA SCREENING Q2Y 11/8/2019 DTaP/Tdap/Td series (2 - Td) 2/28/2022 COLONOSCOPY 12/2/2023 Allergies as of 4/19/2018  Review Complete On: 4/19/2018 By: Shu Muniz No Known Allergies Current Immunizations  Reviewed on 11/6/2017 Name Date Influenza High Dose Vaccine PF 12/27/2016 Influenza Vaccine 11/3/2017, 11/24/2015 Influenza Vaccine PF 12/18/2014 11:25 AM  
 Influenza Vaccine Whole 12/7/2012, 10/1/2009 Pneumococcal Conjugate (PCV-13) 12/29/2015  9:50 AM  
 Pneumococcal Polysaccharide (PPSV-23) 6/27/2017  8:46 AM  
 TDAP Vaccine 2/29/2012 ZZZ-RETIRED (DO NOT USE) Pneumococcal Vaccine (Unspecified Type) 2/29/2012 Zoster 6/21/2012 Not reviewed this visit Vitals BP Pulse Temp Resp Height(growth percentile) Weight(growth percentile) 152/68 (BP 1 Location: Right arm, BP Patient Position: Sitting) 81 98.4 °F (36.9 °C) (Oral) 14 5' 3\" (1.6 m) 158 lb (71.7 kg) SpO2 BMI OB Status Smoking Status 97% 27.99 kg/m2 Postmenopausal Never Smoker Vitals History BMI and BSA Data Body Mass Index Body Surface Area  
 27.99 kg/m 2 1.79 m 2 Preferred Pharmacy Pharmacy Name Phone [id number], [address]. [phone] Your Updated Medication List  
  
   
This list is accurate as of 4/19/18 12:28 PM.  Always use your most recent med list.  
  
  
  
  
 allopurinol 100 mg tablet Commonly known as:  Jacqueline Jones Take  by mouth daily. aspirin 81 mg chewable tablet Take 81 mg by mouth daily. atorvastatin 40 mg tablet Commonly known as:  LIPITOR Take 1 Tab by mouth daily. BENICAR HCT 40-25 mg per tablet Generic drug:  olmesartan-hydroCHLOROthiazide Take 1 Tab by mouth daily. clopidogrel 75 mg Tab Commonly known as:  PLAVIX Take 1 Tab by mouth daily. COREG 12.5 mg tablet Generic drug:  carvedilol Take 25 mg by mouth daily. gemfibrozil 600 mg tablet Commonly known as:  LOPID Take 1 Tab by mouth daily. insulin lispro protamine/insulin lispro 100 unit/mL (75-25) injection Commonly known as:  HUMALOG MIX 75/25  
14 Units by SubCUTAneous route two (2) times a day. Indications: type 2 diabetes mellitus INVOKANA 100 mg tablet Generic drug:  canagliflozin Take 300 mg by mouth daily. Indications: TYPE 2 DIABETES MELLITUS  
  
 metFORMIN 1,000 mg tablet Commonly known as:  GLUCOPHAGE Take 1,000 mg by mouth two (2) times daily (with meals). NORVASC 10 mg tablet Generic drug:  amLODIPine Take  by mouth daily. VITAMIN D 5,000 unit Tab tablet Generic drug:  cholecalciferol (VITAMIN D3) Take 2,000 Units by mouth. Takes 12 units am and 14 units pm  
  
  
  
  
Introducing [employer]! [facility] introduces Smartfieldt patient portal. Now you can access parts of your medical record, email your doctor's office, and request medication refills online. 1. In your internet browser, go to [email or web address] 2. Click on the First Time User? Click Here link in the Sign In box. You will see the New Member Sign Up page. 3. Enter your iHeart Access Code exactly as it appears below. You will not need to use this code after youve completed the sign-up process. If you do not sign up before the expiration date, you must request a new code. · iHeart Access Code: [email or web address] Expires: 4/30/2018 11:34 AM 
 
4. Enter the last four digits of your Social Security Number (xxxx) and Date of Birth (mm/dd/yyyy) as indicated and click Submit. You will be taken to the next sign-up page. 5. Create a ZeroTurnaroundt ID. This will be your iHeart login ID and cannot be changed, so think of one that is secure and easy to remember. 6. Create a iHeart password. You can change your password at any time. 7. Enter your Password Reset Question and Answer. This can be used at a later time if you forget your password. 8. Enter your e-mail address. You will receive e-mail notification when new information is available in [address] 9. Click Sign Up. You can now view and download portions of your medical record. 10. Click the Download Summary menu link to download a portable copy of your medical information. If you have questions, please visit the Frequently Asked Questions section of the iHeart website. Remember, iHeart is NOT to be used for urgent needs. For medical emergencies, dial 911. Now available from your iPhone and Android! Please provide this summary of care documentation to your next provider. Your primary care clinician is listed as Syliva Parents. If you have any questions after today's visit, please call [phone].

## 2018-04-19 NOTE — PROGRESS NOTES
Goals Addressed             Most Recent     Attends follow-up appointments as directed. On track (4/19/2018)             Will follow up with PCP, Neurology, and Surgeon   PCP, 4/19/2018   Neurology, 4/23/2018   Surgeon, 2 weeks  4/16/2018: patient confirmed appointments and will schedule with Surgeon  Patient attended their post discharge follow up appointment with Dr. Jeanine Luis as scheduled.

## 2018-04-19 NOTE — PROGRESS NOTES
1. Have you been to the ER, urgent care clinic or hospitalized since your last visit? YES. 4-10-18 [facility] for TIA    2. Have you seen or consulted any other health care providers outside of the [address] [place] [address] since your last visit (Include any pap smears or colon screening)? NO      Do you have an Advanced Directive? NO    Would you like information on Advanced Directives?  NO    Chief Complaint   Patient presents with    Transitions Of Care     TIA

## 2018-04-19 NOTE — PROGRESS NOTES
Patient being seen today for transitional care management    Patient was admitted to [facility] from 4/10-13/18            Initial interactive contact was done by nurse navigator Joyce Schroeder I thoroughly reviewed the discharge summary, notes, consults, labs and imaging studies in the electronic record. Pertinent details are summarized below and the record has been updated to reflect recent events    She was at the 's office when she had right sided TIA with left sided weakness and slurred speech. CT in the ER neg, admitted and seen by neuro. MRI showed nothing acute, MRA head and neck showed right vertebral and PAIGE lesions as noted below. She was started on plavix and asa, she will be seeing Dr Jah Murphy for outpt eval of the PAIGE lesion. No arrhythmia noted, echo unchanged from prior 11/17 which had neg bubble study. She had outpt holter 1/18 which was negative but has not had prolonged monitoring and not seen officially by cardiology. Labs as below, they inc the lipitor dosing and added lopid. Since dc, she feels  And no new sx    She continues to see Elenita Grey. Denies polyuria, polydipsia, nocturia, vision change. No new GI or Gu complaints.       LAST MEDICARE WELLNESS EXAM: 12/29/15, 6/27/17   ACP 6/27/17    Past Medical History:   Diagnosis Date    Calculus of kidney 6/16    Dr Dari Dean; right ureteral stone s/p EWSL    Diabetes ([facility] [address]     Dr Jovany Rodriguez GERD (gastroesophageal reflux disease)     Hyperlipidemia     Hypertension     Hypovitaminosis D     Stroke ([facility] [address]     TIA (transient ischemic attack)      Past Surgical History:   Procedure Laterality Date    CARDIAC SURG PROCEDURE UNLIST  01/2018    holter few pac/pvc, 4 beat run svt rate 156    ECHO 2D ADULT  11/2017    conc lvh, ef 60%, DD (3/12); nl lv, ef 68%, conc lvh, no wma, mild dd, no valvular abn, neg bubble study (11/17 - [address])    HX BREAST BIOPSY  2009    negative    HX COLONOSCOPY  12/13    negative Dr Yolis Pereira HX ORTHOPAEDIC      DEXA t score 1.10 spine, 1.10 hip (11/13)    NEUROLOGICAL PROCEDURE UNLISTED  11/2017    mri showed mod periventricular wm changes    STRESS TEST THALLIUM STUDY  3/12    negative w EF 70%    VASCULAR SURGERY PROCEDURE UNLIST  11/2017    carotids showed bilat <50% ICA, right vertebral art obst    VASCULAR SURGERY PROCEDURE UNLIST  12/2017    CTA head/neck showed no aneurysm, <50% right intracranial ICA, diminutive right vertebral art    VASCULAR SURGERY PROCEDURE UNLIST  04/2018    JUAN R 1.0, L 1.1    VASCULAR SURGERY PROCEDURE UNLIST  04/2018    MRA right vert artery not visualized due to occlusion/stenosis, left patent, prox PAIGE 50-60% stenosis     Social History     Social History    Marital status:      Spouse name: N/A    Number of children: N/A    Years of education: N/A     Occupational History    retired teacher [employer]      Social History Main Topics    Smoking status: Never Smoker    Smokeless tobacco: Never Used    Alcohol use 1.2 oz/week     1 Glasses of wine, 1 Cans of beer, 0 Standard drinks or equivalent per week      Comment: social glass once a year    Drug use: No    Sexual activity: Not on file     Other Topics Concern    Not on file     Social History Narrative     No Known Allergies    Current Outpatient Prescriptions   Medication Sig    atorvastatin (LIPITOR) 40 mg tablet Take 1 Tab by mouth daily.  clopidogrel (PLAVIX) 75 mg tab Take 1 Tab by mouth daily.  gemfibrozil (LOPID) 600 mg tablet Take 1 Tab by mouth daily.  aspirin 81 mg chewable tablet Take 81 mg by mouth daily.  insulin lispro protamine/insulin lispro (HUMALOG MIX 75/25) 100 unit/mL (75-25) injection 14 Units by SubCUTAneous route two (2) times a day. Indications: type 2 diabetes mellitus    canagliflozin (INVOKANA) 100 mg tab Take 300 mg by mouth daily. Indications: TYPE 2 DIABETES MELLITUS    carvedilol (COREG) 12.5 mg tablet Take 25 mg by mouth daily.     metFORMIN (GLUCOPHAGE) 1,000 mg tablet Take 1,000 mg by mouth two (2) times daily (with meals).  amLODIPine (NORVASC) 10 mg tablet Take  by mouth daily.  olmesartan-hydrochlorothiazide (BENICAR HCT) 40-25 mg per tablet Take 1 Tab by mouth daily.  Cholecalciferol, Vitamin D3, (VITAMIN D) 5,000 unit Tab Take 2,000 Units by mouth. Takes 12 units am and 14 units pm    allopurinol (ZYLOPRIM) 100 mg tablet Take  by mouth daily. No current facility-administered medications for this visit. REVIEW OF SYSTEMS: gyn>5 yrs, mammo 8/16, DEXA 11/13, sees Dr Sheryl Steele, no podiatry, colo 12/13 Dr Sean Snell  Ophtho - no vision change or eye pain  Oral - no mouth pain, tongue or tooth problems  Ears - no hearing loss, ear pain, fullness, no swallowing problems  Cardiac - no CP, PND, orthopnea, edema, palpitations or syncope  Chest - no breast masses  Resp - no wheezing, chronic coughing, dyspnea  GI - no heartburn, nausea, vomiting, change in bowel habits, bleeding, hemorrhoids  Urinary - no dysuria, hematuria, flank pain, urgency, frequency  Genitals - no genital lesions, discharge, masses, ulceration, warts  Ortho - no swelling, dec ROM, myalgias  Derm - no nail abnormalities, rashes, lesions of note, hair loss  Psych - denies any anxiety or depression symptoms, no hallucinations or violent ideation  Constitutional - no wt loss, night sweats, unexplained fevers  Neuro - no focal weakness, numbness, paresthesias, incoordination, ataxia, involuntary movements  Endo - no polyuria, polydipsia, nocturia, hot flashes    Visit Vitals    /82 (BP 1 Location: Right arm, BP Patient Position: Sitting)    Pulse 81    Temp 98.4 °F (36.9 °C) (Oral)    Resp 14    Ht 5' 3\" (1.6 m)    Wt 158 lb (71.7 kg)    SpO2 97%    BMI 27.99 kg/m2     A&O x3  Affect is appropriate. Mood stable  No apparent distress  Anicteric, no JVD, adenopathy or thyromegaly.    No carotid bruits or radiated murmur  Lungs clear to auscultation, no wheezes or rales  Heart showed regular rate and rhythm. No murmur, rubs, gallops  Abdomen soft nontender, no hepatosplenomegaly or masses.    Ext without c/c/e    LABS  From 2/12 showed   gluc 330, cr 0.93, gfr 77,  alt 30, hba1c 11.2,                   chol 199, tg 366, hdl 35, ldl-c 67,   wbc 7.1, hb 15.2, plt 273,   tsh 2.20  From 6/12 showed                   hba1c 8.2,   ldl-p 1018, chol 112, tg 95,   hdl 32, ldl-c 61,   umar 1.5  From 12/12 showed                   hba1c 8.9,   ldl-p 1486, chol 132, tg 109, hdl 37, ldl-c 73  From 6/13 showed   gluc 128, cr 0.91, gfr 68,  alt 13, hba1c 7.8,                  chol 115, tg 157, hdl 28, ldl-c 56  From 12/13 showed                   hba1c 7.5,                  wbc 7.5, hb 13.2, plt 297,  vit d 69.2  From 6/14 showed   gluc 171, cr 0.85, gfr>60, alt<5,        chol 134, tg 165, hdl 26, ldl-c 73  From 12/14 showed        hba1c 9.2,       chol 151, tg 256, hdl 33, ldl-c 67,   umar 4.0  From 3/15 showed       hba1c 7.3,              ldl-c 82,  umar neg  From 7/15 showed   gluc 130, cr 1.16, gfr 49,  alt 15, hba1c 7.5,       chol 128, tg 271, hdl 29, ldl-c 45  From 12/15 showed        hba1c 9.6,        chol 160, tg 218, hdl 33, ldl-c 83,   umar 7.0  From 2/16 showed   gluc 117, cr 1.15, gfr 58,  alt 18, hba1c 7.9,       chol 112, tg 184, hdl 30, ldl-c 45,   umar 4.3,          vit d 45.3, uric 7.2  From 6/16 showed   gluc 98      hba1c 7.7,       chol 143, tg 178, hdl 31, ldl-c 76  From 12/16 showed gluc 170, cr 1.11, gfr 60,    hba1c 8.9,                  umar 4.0  From 6/17 showed   gluc 148, cr 1.06, gfr>60,    hba1c 8.1,       chol 192, tg 202, hdl 39, ldl-c 113, wbc 6.3, hb 13.8, plt 281  From 1/18 showed   gluc 154, cr 1.08, gfr>60, alt 11, hba1c 8.0,                  umar 16.0,        vit d 40.7  From 4/18 showed   gluc 140, cr 0.90, gfr>60,    hba1c 8.5,       Chol 216, tg 356, hdl 31, ldl-c na   wbc 7.0, hb 13.1, plt 244, ck/trop neg    Patient Active Problem List Diagnosis Code    Hypovitaminosis D E55.9    Dyslipidemia E78.5    Essential hypertension I10    GERD without esophagitis K21.9    Advance directive discussed with patient Z70.80    Nephrolithiasis s/p EWSL Dr Dirk Kawasaki 6/16 N20.0    Uncontrolled type 2 diabetes mellitus without complication, with long-term current use of insulin NP Shaina Mena E11.65, Z79.4    TIA (transient ischemic attack) G45.9    Symptomatic carotid artery stenosis I65.29     Assessment and plan:  1. Diabetes. F/u w ESTHER Mena, Dr Kristi Albright  2. Dyslipidemia. Continue current   3. Hypertension. Continue current regimen   4. GERD. PPI and avoidance measures  5. Hypovit d. Doing well   6. TIA. Awaiting eval by vascular Dr Horacio Dejesus for the PAIGE lesion. Will sched Dr Yohana Rosas group for eval possible arrhythmia  7. Nephrolithiasis. F/U Dr Dirk Kawasaki      RTC 5/18      Above conditions discussed at length and patient vocalized understanding.   All questions answered to patient satifaction

## 2018-04-24 ENCOUNTER — PATIENT OUTREACH (OUTPATIENT)
Dept: INTERNAL MEDICINE CLINIC | Age: 68
End: 2018-04-24

## 2018-04-24 NOTE — PROGRESS NOTES
Nurse Navigator attempted to contact patient post [place] appointment. Left message for the patient to contact the office on the voicemail.

## 2018-04-27 ENCOUNTER — TELEPHONE (OUTPATIENT)
Dept: CARDIOLOGY CLINIC | Age: 68
End: 2018-04-27

## 2018-04-27 NOTE — TELEPHONE ENCOUNTER
Fax received from [facility] Neurologists to make new patient appointment for svt on holter monitor in Jan 2018. ... I called patient to schedule appointment. .. LVM at  #, cell # VM is full.   Reyes Coley

## 2018-05-09 ENCOUNTER — PATIENT OUTREACH (OUTPATIENT)
Dept: INTERNAL MEDICINE CLINIC | Age: 68
End: 2018-05-09

## 2018-05-09 RX ORDER — GEMFIBROZIL 600 MG/1
600 TABLET, FILM COATED ORAL DAILY
Qty: 30 TAB | Refills: 0 | Status: SHIPPED | OUTPATIENT
Start: 2018-05-09 | End: 2018-06-14 | Stop reason: SDUPTHER

## 2018-05-09 RX ORDER — CLOPIDOGREL BISULFATE 75 MG/1
75 TABLET ORAL DAILY
Qty: 30 TAB | Refills: 0 | Status: SHIPPED | OUTPATIENT
Start: 2018-05-09 | End: 2018-06-14 | Stop reason: SDUPTHER

## 2018-05-09 RX ORDER — ATORVASTATIN CALCIUM 40 MG/1
40 TABLET, FILM COATED ORAL DAILY
Qty: 30 TAB | Refills: 0 | Status: SHIPPED | OUTPATIENT
Start: 2018-05-09 | End: 2018-06-14 | Stop reason: SDUPTHER

## 2018-05-09 NOTE — PROGRESS NOTES
Hospital Discharge Follow-Up      Date/Time:  2018 12:20 PM    Patient was admitted to [facility] on 2018 and discharged on 2018 for scheduled bilateral carotid angiogram. The physician discharge summary was not available at the time of outreach. Patient was contacted within 2 business days of discharge. Nurse Navigator (NN) contacted the patient by telephone to perform post hospital discharge assessment. Verified name and  with patient as identifiers. Provided introduction to self, and explanation of the Nurse Navigator role. Patient reports:  Dressing clean, dry, and intact to the right groin  Believes she has to follow up with Vascular in 1 month, will call to confirm  Have not check blood pressure or blood sugar today  Blood sugar has been running anywhere from     Patient denies:  Pain  SOB  Numbness  Tingling  Coolness  Dizziness  Lightheadedness  Bleeding  Swelling   Fever  Chills      Reviewed discharge instructions and red flags with patient who verbalized understanding. Patient given an opportunity to ask questions and does not have any further questions or concerns at this time. The patient agrees to contact the PCP office for questions related to their healthcare. NN provided contact information for future reference. Barriers to care? None at this time    Advance Care Planning:   Does patient have an Advance Directive:  not on file       Medication:     New Medications at Discharge: None  Changed Medications at Discharge: None  Discontinued Medications at Discharge: None    Medication refill request for:  Lipitor  Lopid  Plavix  Message sent to provider, pharmacy on file verified with patient     Current Outpatient Prescriptions   Medication Sig    atorvastatin (LIPITOR) 40 mg tablet Take 1 Tab by mouth daily.  clopidogrel (PLAVIX) 75 mg tab Take 1 Tab by mouth daily.  gemfibrozil (LOPID) 600 mg tablet Take 1 Tab by mouth daily.     aspirin 81 mg chewable tablet Take 81 mg by mouth daily.  insulin lispro protamine/insulin lispro (HUMALOG MIX 75/25) 100 unit/mL (75-25) injection 14 Units by SubCUTAneous route two (2) times a day. Indications: type 2 diabetes mellitus    canagliflozin (INVOKANA) 100 mg tab Take 100 mg by mouth daily. Indications: type 2 diabetes mellitus    carvedilol (COREG) 12.5 mg tablet Take 25 mg by mouth daily.  metFORMIN (GLUCOPHAGE) 1,000 mg tablet Take 1,000 mg by mouth two (2) times daily (with meals).  amLODIPine (NORVASC) 10 mg tablet Take  by mouth daily.  olmesartan-hydrochlorothiazide (BENICAR HCT) 40-25 mg per tablet Take 1 Tab by mouth daily.  Cholecalciferol, Vitamin D3, (VITAMIN D) 5,000 unit Tab Take 2,000 Units by mouth. Takes 12 units am and 14 units pm     No current facility-administered medications for this visit. There are no discontinued medications. PCP/Specialist follow up: Future Appointments  Date Time Provider Shanice Sandoval   7/16/2018 9:00 AM Chica Leon MD [address] Attends follow-up appointments as directed. Will follow up with PCP, Neurology, and Surgeon   PCP, 4/19/2018   Neurology, 4/23/2018   Surgeon, 2 weeks  4/16/2018: patient confirmed appointments and will schedule with Surgeon  Patient attended their post discharge follow up appointment with Dr. Ren Dewey as scheduled.  Knowledge and adherence of prescribed medication (ie. action, side effects, missed dose, etc.). Medication adherence   Plavix   Lopid  4/16/2018: patient confirmed picking up medication, starting, and understand purpose       Prevent complications post hospitalization.             No admissions within 30 days post discharge, 4/13/2018

## 2018-06-08 ENCOUNTER — PATIENT OUTREACH (OUTPATIENT)
Dept: INTERNAL MEDICINE CLINIC | Age: 68
End: 2018-06-08

## 2018-06-08 NOTE — PROGRESS NOTES
Goals Addressed             Most Recent       Post Hospitalization     COMPLETED: Prevent complications post hospitalization.    On track (6/8/2018)             No admissions within 30 days post discharge, 4/13/2018  Episode closed: no hospitalization or ED admission post 30 days from discharge; 5/8/2018

## 2018-06-14 RX ORDER — CLOPIDOGREL BISULFATE 75 MG/1
TABLET ORAL
Qty: 30 TAB | Refills: 0 | Status: SHIPPED | OUTPATIENT
Start: 2018-06-14 | End: 2018-07-12 | Stop reason: SDUPTHER

## 2018-06-14 RX ORDER — GEMFIBROZIL 600 MG/1
TABLET, FILM COATED ORAL
Qty: 30 TAB | Refills: 0 | Status: SHIPPED | OUTPATIENT
Start: 2018-06-14 | End: 2018-07-12 | Stop reason: SDUPTHER

## 2018-06-14 RX ORDER — ATORVASTATIN CALCIUM 40 MG/1
TABLET, FILM COATED ORAL
Qty: 30 TAB | Refills: 0 | Status: SHIPPED | OUTPATIENT
Start: 2018-06-14 | End: 2018-07-12 | Stop reason: SDUPTHER

## 2018-07-10 NOTE — PROGRESS NOTES
This is a subsequent medicare wellness visit    I have reviewed the patient's medical history in detail and updated the computerized patient record. History     Past Medical History:   Diagnosis Date    Calculus of kidney 6/16    Dr Ashli Kidd; right ureteral stone s/p EWSL    Diabetes ([facility] [address]     Dr Esteban Long GERD (gastroesophageal reflux disease)     Hyperlipidemia     Hypertension     Hypovitaminosis D     Stroke ([facility] [address]     last stroke 4/2018    TIA (transient ischemic attack)       Past Surgical History:   Procedure Laterality Date    CARDIAC SURG PROCEDURE UNLIST  01/2018    holter few pac/pvc, 4 beat run svt rate 156    ECHO 2D ADULT  11/2017    conc lvh, ef 60%, DD (3/12); nl lv, ef 68%, conc lvh, no wma, mild dd, no valvular abn, neg bubble study (11/17 - [address])    HX BREAST BIOPSY  2009    negative    HX COLONOSCOPY  12/13    negative Dr Cortez Mouse t score 1.10 spine, 1.10 hip (11/13)    NEUROLOGICAL PROCEDURE UNLISTED  11/2017    mri showed mod periventricular wm changes    STRESS TEST THALLIUM STUDY  3/12    negative w EF 70%    VASCULAR SURGERY PROCEDURE UNLIST  11/2017    carotids showed bilat <50% ICA, right vertebral art obst    VASCULAR SURGERY PROCEDURE UNLIST  12/2017    CTA head/neck showed no aneurysm, <50% right intracranial ICA, diminutive right vertebral art    VASCULAR SURGERY PROCEDURE UNLIST  04/2018    JUAN R 1.0, L 1.1    VASCULAR SURGERY PROCEDURE UNLIST  04/2018    MRA right vert artery not visualized due to occlusion/stenosis, left patent, prox PAIGE 50-60% stenosis     Current Outpatient Prescriptions   Medication Sig Dispense Refill    clopidogrel (PLAVIX) 75 mg tab Take 1 Tab by mouth daily. 90 Tab 3    atorvastatin (LIPITOR) 40 mg tablet Take 1 Tab by mouth daily. 90 Tab 3    aspirin 81 mg chewable tablet Take 81 mg by mouth daily.       insulin lispro protamine/insulin lispro (HUMALOG MIX 75/25) 100 unit/mL (75-25) injection 14 Units by SubCUTAneous route two (2) times a day. Indications: type 2 diabetes mellitus, 16 units  before breakast and dinner      canagliflozin (INVOKANA) 100 mg tab Take 100 mg by mouth daily. Indications: type 2 diabetes mellitus      carvedilol (COREG) 12.5 mg tablet Take 12.5 mg by mouth daily.  metFORMIN (GLUCOPHAGE) 1,000 mg tablet Take 1,000 mg by mouth two (2) times daily (with meals).  amLODIPine (NORVASC) 10 mg tablet Take  by mouth daily.  olmesartan-hydrochlorothiazide (BENICAR HCT) 40-25 mg per tablet Take 1 Tab by mouth daily.  Cholecalciferol, Vitamin D3, (VITAMIN D) 5,000 unit Tab Take 2,000 Units by mouth.  Takes 12 units am and 14 units pm       No Known Allergies  Family History   Problem Relation Age of Onset    Diabetes Father     Hypertension Father     Cancer Sister     Hypertension Sister      Social History   Substance Use Topics    Smoking status: Never Smoker    Smokeless tobacco: Never Used    Alcohol use 1.2 oz/week     1 Glasses of wine, 1 Cans of beer, 0 Standard drinks or equivalent per week      Comment: social glass once a year     Diet, Lifestyle: generally follows a low fat low cholesterol diet, generally follows a low sodium diet, follows a diabetic diet regularly, exercises sporadically    Exercise level: not active    Depression Risk Screen     PHQ over the last two weeks 7/16/2018   PHQ Not Done -   Little interest or pleasure in doing things Not at all   Feeling down, depressed or hopeless Not at all   Total Score PHQ 2 0     SCREENINGS  Colonoscopy last done 12/13 Dr Jamal Harvey last done 8/16  DEXA last done 11/13  Dr Sharri Chi last done >5 yrs ago due to patient declining    Immunization History   Administered Date(s) Administered    Influenza High Dose Vaccine PF 12/27/2016    Influenza Vaccine 11/24/2015, 11/03/2017    Influenza Vaccine PF 12/18/2014    Influenza Vaccine Whole 10/01/2009, 12/07/2012    Pneumococcal Conjugate (PCV-13) 12/29/2015    Pneumococcal Polysaccharide (PPSV-23) 06/27/2017    TDAP Vaccine 02/29/2012    ZZZ-RETIRED (DO NOT USE) Pneumococcal Vaccine (Unspecified Type) 02/29/2012    Zoster 06/21/2012     Alcohol Risk Screen   On any occasion during the past 3 months, have you had more than 3 drinks containing alcohol? No    Do you average more than 7 drinks per week? No    Functional Ability and Level of Safety     Hearing Loss   normal-to-mild    Vision - no acute complaints and is followed by Dr Lili Ramirez of [facility]   Self-care     Fall Risk Screen     Fall Risk Assessment, last 12 mths 7/16/2018   Able to walk? Yes   Fall in past 12 months? No   Fall with injury? -   Number of falls in past 12 months -   Fall Risk Score -     Abuse Screen   Patient is not abused    Review of Systems   A comprehensive review of systems was negative except for that written in the HPI. Physical Examination     Visit Vitals    /78    Pulse 71    Temp 98 °F (36.7 °C) (Oral)    Resp 14    Ht 5' 3\" (1.6 m)    Wt 160 lb (72.6 kg)    SpO2 97%    BMI 28.34 kg/m2       Patient Care Team:  Kelli Elliott MD as PCP - General (Internal Medicine)  Olivia Lema, RN as Ambulatory Care Navigator (Internal Medicine)  Sharon Veronica MD (Ophthalmology)  Davide Donaldson, RN as Ambulatory Care Navigator (Internal Medicine)  Gerald Sanchez NP     End-of-life planning  Advanced Directive discussed and documented: YES    Assessment/Plan   Education and counseling provided:  Are appropriate based on today's review and evaluation       ICD-10-CM ICD-9-CM    1. Medicare annual wellness visit, subsequent Z00.00 V70.0    2. Advanced directives, counseling/discussion Z71.89 V65.49 ADVANCE CARE PLANNING FIRST 30 MINS   3. Screening for alcoholism Z13.89 V79.1 MS ANNUAL ALCOHOL SCREEN 15 MIN   4. Screening for depression Z13.89 V79.0 DEPRESSION SCREEN ANNUAL   5. Screen for colon cancer Z12.11 V76.51    6. Encounter for screening mammogram for malignant neoplasm of breast Z12.31 V76.12 FAITH MAMMO BI SCREENING INCL CAD   7. Disorder of bone and cartilage M89.9 733.90     M94.9     8. Uncontrolled type 2 diabetes mellitus without complication, with long-term current use of insulin NP Subhash Pap E11.65 250.02     Z79.4 V58.67    9. Symptomatic stenosis of right carotid artery I65.21 433.10    10. Transient cerebral ischemia, unspecified type G45.9 435.9 clopidogrel (PLAVIX) 75 mg tab   11. Essential hypertension I10 401.9    12. Dyslipidemia E78.5 272.4 atorvastatin (LIPITOR) 40 mg tablet   13. Hypovitaminosis D E55.9 268.9    14. Transaminasemia R74.0 790.4 HEPATIC FUNCTION PANEL     current treatment plan is effective, no change in therapy  lab results and schedule of future lab studies reviewed with patient  reviewed diet, exercise and weight control  cardiovascular risk and specific lipid/LDL goals reviewed  use of aspirin to prevent MI and TIA's discussed. End of life discussion undertaken.   She will work on getting medical directive in place  Colonoscopy to be scheduled 2023  Mammo to be done  DEXA to be scheduled per Dr Cirilo Lujan

## 2018-07-10 NOTE — ACP (ADVANCE CARE PLANNING)
Advance Care Planning    Advance Care Planning (ACP) Provider Note - Comprehensive     Date of ACP Conversation: 07/16/18  Persons included in Conversation:  patient  Length of ACP Conversation in minutes:  16 minutes    Authorized Decision Maker (if patient is incapable of making informed decisions): This person is: Other Legally Authorized Decision Maker (e.g. Next of Kin)          General ACP for ALL Patients with Decision Making Capacity:   Importance of advance care planning, including choosing a healthcare agent to communicate patient's healthcare decisions if patient lost the ability to make decisions, such as after a sudden illness or accident  Understanding of the healthcare agent role was assessed and information provided  Exploration of values, goals, and preferences if recovery is not expected, even with continued medical treatment in the event of: Imminent death  Severe, permanent brain injury    Review of Existing Advance Directive:  na    For Serious or Chronic Illness:  Understanding of medical condition    Understanding of CPR, goals and expected outcomes, benefits and burdens discussed.     Interventions Provided:  Recommended completion of Advance Directive form after review of ACP materials and conversation with prospective healthcare agent   Recommended communicating the plan and making copies for the healthcare agent, personal physician, and others as appropriate (e.g., health system)  Recommended review of completed ACP document annually or upon change in health status

## 2018-07-10 NOTE — PROGRESS NOTES
79 y.o. BLACK OR  female who presents for evaluation. She continues to see Wily Macdonald. Denies polyuria, polydipsia, nocturia, vision change. Reports no cardiovascular complaints. She saw cardiology after the last encounter but is unclear as to the name. She has f/u appt in the near future    She underwent angiogram by Dr Gabe Santos and he was recommending PAIGE procedure but cardiology has to clear her for some issue they found on preop, details are fuzzy again. What complicates things is Dr Gabe Santos is set to retire end of July and her care may have to be handed over to Dr Clarice Robert who will be taking over the practice apparently    No new neurologic complaints    No new GI or Gu complaints.       Of note, during her admission in April she was started on lopid in-house, no sfx to report although lfts elev as below    [address]: 12/29/15, 6/27/17, 7/16/18     ACP 6/27/17, 7/16/18    Past Medical History:   Diagnosis Date    Calculus of kidney 6/16    Dr Sanya Armenta; right ureteral stone s/p EWSL    Diabetes ([facility] [address]     Dr Pastor Cartagena GERD (gastroesophageal reflux disease)     Hyperlipidemia     Hypertension     Hypovitaminosis D     Stroke ([facility] [address]     last stroke 4/2018    TIA (transient ischemic attack)      Past Surgical History:   Procedure Laterality Date    CARDIAC SURG PROCEDURE UNLIST  01/2018    holter few pac/pvc, 4 beat run svt rate 156    ECHO 2D ADULT  11/2017    conc lvh, ef 60%, DD (3/12); nl lv, ef 68%, conc lvh, no wma, mild dd, no valvular abn, neg bubble study (11/17 - [address])    HX BREAST BIOPSY  2009    negative    HX COLONOSCOPY  12/13    negative Dr Mg Stains t score 1.10 spine, 1.10 hip (11/13)    NEUROLOGICAL PROCEDURE UNLISTED  11/2017    mri showed mod periventricular wm changes    STRESS TEST THALLIUM STUDY  3/12    negative w EF 70%    VASCULAR SURGERY PROCEDURE UNLIST  11/2017    carotids showed bilat <50% ICA, right vertebral art obst    VASCULAR SURGERY PROCEDURE UNLIST  12/2017    CTA head/neck showed no aneurysm, <50% right intracranial ICA, diminutive right vertebral art    VASCULAR SURGERY PROCEDURE UNLIST  04/2018    JUAN R 1.0, L 1.1    VASCULAR SURGERY PROCEDURE UNLIST  04/2018    MRA right vert artery not visualized due to occlusion/stenosis, left patent, prox PAIGE 50-60% stenosis     Social History     Social History    Marital status:      Spouse name: N/A    Number of children: N/A    Years of education: N/A     Occupational History    retired teacher [employer]      Social History Main Topics    Smoking status: Never Smoker    Smokeless tobacco: Never Used    Alcohol use 1.2 oz/week     1 Glasses of wine, 1 Cans of beer, 0 Standard drinks or equivalent per week      Comment: social glass once a year    Drug use: No    Sexual activity: Not on file     Other Topics Concern    Not on file     Social History Narrative     No Known Allergies    Current Outpatient Prescriptions   Medication Sig    atorvastatin (LIPITOR) 40 mg tablet Take 1 Tab by mouth daily.  gemfibrozil (LOPID) 600 mg tablet Take 1 Tab by mouth daily.  clopidogrel (PLAVIX) 75 mg tab Take 1 Tab by mouth daily.  aspirin 81 mg chewable tablet Take 81 mg by mouth daily.  insulin lispro protamine/insulin lispro (HUMALOG MIX 75/25) 100 unit/mL (75-25) injection 14 Units by SubCUTAneous route two (2) times a day. Indications: type 2 diabetes mellitus, 16 units  before breakast and dinner    canagliflozin (INVOKANA) 100 mg tab Take 100 mg by mouth daily. Indications: type 2 diabetes mellitus    carvedilol (COREG) 12.5 mg tablet Take 12.5 mg by mouth daily.  metFORMIN (GLUCOPHAGE) 1,000 mg tablet Take 1,000 mg by mouth two (2) times daily (with meals).  amLODIPine (NORVASC) 10 mg tablet Take  by mouth daily.  olmesartan-hydrochlorothiazide (BENICAR HCT) 40-25 mg per tablet Take 1 Tab by mouth daily.       Cholecalciferol, Vitamin D3, (VITAMIN D) 5,000 unit Tab Take 2,000 Units by mouth. Takes 12 units am and 14 units pm     No current facility-administered medications for this visit. REVIEW OF SYSTEMS: gyn>5 yrs, mammo 8/16, DEXA 11/13, sees Dr Sarah Ledesma, no podiatry, colo 12/13 Dr Aicha Demarco  Ophtho - no vision change or eye pain  Oral - no mouth pain, tongue or tooth problems  Ears - no hearing loss, ear pain, fullness, no swallowing problems  Cardiac - no CP, PND, orthopnea, edema, palpitations or syncope  Chest - no breast masses  Resp - no wheezing, chronic coughing, dyspnea  GI - no heartburn, nausea, vomiting, change in bowel habits, bleeding, hemorrhoids  Urinary - no dysuria, hematuria, flank pain, urgency, frequency  Genitals - no genital lesions, discharge, masses, ulceration, warts  Ortho - no swelling, dec ROM, myalgias    Visit Vitals    /78    Pulse 71    Temp 98 °F (36.7 °C) (Oral)    Resp 14    Ht 5' 3\" (1.6 m)    Wt 160 lb (72.6 kg)    SpO2 97%    BMI 28.34 kg/m2     A&O x3  Affect is appropriate. Mood stable  No apparent distress  Anicteric, no JVD, adenopathy or thyromegaly. No carotid bruits or radiated murmur  Lungs clear to auscultation, no wheezes or rales  Heart showed regular rate and rhythm. No murmur, rubs, gallops  Abdomen soft nontender, no hepatosplenomegaly or masses.    Ext without c/c/e    LABS  From 2/12 showed   gluc 330, cr 0.93, gfr 77,  alt 30, hba1c 11.2,                   chol 199, tg 366, hdl 35, ldl-c 67,   wbc 7.1, hb 15.2, plt 273,   tsh 2.20  From 6/12 showed                   hba1c 8.2,   ldl-p 1018, chol 112, tg 95,   hdl 32, ldl-c 61,   umar 1.5  From 12/12 showed                   hba1c 8.9,   ldl-p 1486, chol 132, tg 109, hdl 37, ldl-c 73  From 6/13 showed   gluc 128, cr 0.91, gfr 68,  alt 13, hba1c 7.8,                  chol 115, tg 157, hdl 28, ldl-c 56  From 12/13 showed                   hba1c 7.5,                  wbc 7.5, hb 13.2, plt 297, vit d 69.2  From 6/14 showed   gluc 171, cr 0.85, gfr>60, alt<5,        chol 134, tg 165, hdl 26, ldl-c 73  From 12/14 showed        hba1c 9.2,       chol 151, tg 256, hdl 33, ldl-c 67,   umar 4.0  From 3/15 showed       hba1c 7.3,              ldl-c 82,  umar neg  From 7/15 showed   gluc 130, cr 1.16, gfr 49,  alt 15, hba1c 7.5,       chol 128, tg 271, hdl 29, ldl-c 45  From 12/15 showed        hba1c 9.6,        chol 160, tg 218, hdl 33, ldl-c 83,   umar 7.0  From 2/16 showed   gluc 117, cr 1.15, gfr 58,  alt 18, hba1c 7.9,       chol 112, tg 184, hdl 30, ldl-c 45,   umar 4.3,          vit d 45.3, uric 7.2  From 6/16 showed   gluc 98      hba1c 7.7,       chol 143, tg 178, hdl 31, ldl-c 76  From 12/16 showed gluc 170, cr 1.11, gfr 60,    hba1c 8.9,                  umar 4.0  From 6/17 showed   gluc 148, cr 1.06, gfr>60,    hba1c 8.1,       chol 192, tg 202, hdl 39, ldl-c 113, wbc 6.3, hb 13.8, plt 281  From 1/18 showed   gluc 154, cr 1.08, gfr>60, alt 11, hba1c 8.0,                  umar 16.0,        vit d 40.7  From 4/18 showed   gluc 140, cr 0.90, gfr>60,    hba1c 8.5,       chol 216, tg 356, hdl 31, ldl-c na   wbc 7.0, hb 13.1, plt 244, ck/trop neg  From 5/18 showed        hba1c 8.1    Results for orders placed or performed during the hospital encounter of 07/05/18   CBC WITH AUTOMATED DIFF   Result Value Ref Range    WBC 6.7 4.0 - 11.0 1000/mm3    RBC 5.12 3.60 - 5.20 M/uL    HGB 14.2 13.0 - 17.2 gm/dl    HCT 41.9 37.0 - 50.0 %    MCV 81.8 80.0 - 98.0 fL    MCH 27.7 25.4 - 34.6 pg    MCHC 33.9 30.0 - 36.0 gm/dl    PLATELET 109 376 - 454 1000/mm3    MPV 11.7 (H) 6.0 - 10.0 fL    RDW-SD 38.6 36.4 - 46.3      NRBC 0 0 - 0      IMMATURE GRANULOCYTES 0.3 0.0 - 3.0 %    NEUTROPHILS 73.6 (H) 34 - 64 %    LYMPHOCYTES 15.5 (L) 28 - 48 %    MONOCYTES 5.3 1 - 13 %    EOSINOPHILS 4.8 0 - 5 %    BASOPHILS 0.5 0 - 3 %   METABOLIC PANEL, COMPREHENSIVE   Result Value Ref Range    Sodium 141 136 - 145 mEq/L    Potassium 3.7 3.5 - 5.1 mEq/L    Chloride 106 98 - 107 mEq/L    CO2 27 21 - 32 mEq/L    Glucose 157 (H) 74 - 106 mg/dl    BUN 20 7 - 25 mg/dl    Creatinine 1.1 0.6 - 1.3 mg/dl    GFR est AA >60.0      GFR est non-AA 53      Calcium 8.8 8.5 - 10.1 mg/dl    AST (SGOT) 80 (H) 15 - 37 U/L    ALT (SGPT) 103 (H) 12 - 78 U/L    Alk. phosphatase 124 (H) 45 - 117 U/L    Bilirubin, total 0.4 0.2 - 1.0 mg/dl    Protein, total 7.8 6.4 - 8.2 gm/dl    Albumin 3.7 3.4 - 5.0 gm/dl    Anion gap 9 5 - 15 mmol/L   PROTHROMBIN TIME + INR   Result Value Ref Range    Prothrombin time 10.5 10.2 - 12.9 seconds    INR 0.9 0.1 - 1.1     PTT   Result Value Ref Range    aPTT 27.6 25.1 - 36.5 seconds   EKG, 12 LEAD, INITIAL   Result Value Ref Range    Ventricular Rate 67 BPM    Atrial Rate 67 BPM    P-R Interval 116 ms    QRS Duration 66 ms    Q-T Interval 408 ms    QTC Calculation (Bezet) 431 ms    Calculated P Axis 21 degrees    Calculated R Axis 5 degrees    Calculated T Axis 149 degrees    Diagnosis       Normal sinus rhythm  T wave abnormality, consider lateral ischemia  When compared with ECG of 10-APR-2018 15:39,  No significant change was found  Confirmed by Kj Rizo M.D., Pretty Doe. ([id number]) on 2018 2:06:17 PM     BLOOD TYPE, (ABO+RH)   Result Value Ref Range    ABO/Rh(D) B Rh Positive     ANTIBODY SCREEN   Result Value Ref Range    Antibody screen NEG       Patient Active Problem List   Diagnosis Code    Hypovitaminosis D E55.9    Dyslipidemia E78.5    Essential hypertension I10    GERD without esophagitis K21.9    Advance directive discussed with patient Z70.80    Nephrolithiasis s/p EWSL Dr Good Gil  N20.0    Uncontrolled type 2 diabetes mellitus without complication, with long-term current use of insulin NP Anice Box E11.65, Z79.4    TIA (transient ischemic attack) G45.9    Symptomatic carotid artery stenosis I65.29     Assessment and plan:  1. Diabetes. F/u w [facility] Dunaway, Dr Salome Bravo  2. Dyslipidemia.  Will hold the lopid and recheck in 1 month, continue lipitor  3. Hypertension. Continue current regimen   4. GERD. PPI and avoidance measures  5. Hypovit d. Doing well   6. TIA. Continue current regimen. 7.  Nephrolithiasis. F/U Dr Wes Harada  8. Cardiology. Will try to get records from Dr Princess Padilla group  9. Vascular. Awaiting final dispo per Dr Meme Masterson      RTC 11/18      Above conditions discussed at length and patient vocalized understanding.   All questions answered to patient satisfaction

## 2018-07-10 NOTE — PATIENT INSTRUCTIONS
Medicare Wellness Visit, Female    The best way to live healthy is to have a lifestyle where you eat a well-balanced diet, exercise regularly, limit alcohol use, and quit all forms of tobacco/nicotine, if applicable. Regular preventive services are another way to keep healthy. Preventive services (vaccines, screening tests, monitoring & exams) can help personalize your care plan, which helps you manage your own care. Screening tests can find health problems at the earliest stages, when they are easiest to treat. [facility] follows the current, evidence-based guidelines published by the [facility] ([facility]) when recommending preventive services for our patients. Because we follow these guidelines, sometimes recommendations change over time as research supports it. (For example, mammograms used to be recommended annually. Even though Medicare will still pay for an annual mammogram, the newer guidelines recommend a mammogram every two years for women of average risk.)    Of course, you and your provider may decide to screen more often for some diseases, based on your risk and co-morbidities (chronic disease you are already diagnosed with). Preventive services for you include:    - Medicare offers their members a free annual wellness visit, which is time for you and your primary care provider to discuss and plan for your preventive service needs. Take advantage of this benefit every year!    -All people over age 72 should receive the recommended pneumonia vaccines. Current USPSTF guidelines recommend a series of two vaccines for the best pneumonia protection.     -All adults should have a yearly flu vaccine and a tetanus vaccine every 10 years. All adults age 61 years should receive a shingles vaccine once in their lifetime.      -A bone mass density test is recommended when a woman turns 65 to screen for osteoporosis.  This test is only recommended once as a screening. Some providers will use this same test as a disease monitoring tool if you already have osteoporosis. -All adults age 38-68 years who are overweight should have a diabetes screening test once every three years.     -Other screening tests & preventive services for persons with diabetes include: an eye exam to screen for diabetic retinopathy, a kidney function test, a foot exam, and stricter control over your cholesterol.     -Cardiovascular screening for adults with routine risk involves an electrocardiogram (ECG) at intervals determined by the provider.     -Colorectal cancer screenings should be done for adults age 54-65 years with normal risk. There are a number of acceptable methods of screening for this type of cancer. Each test has its own benefits and drawbacks. Discuss with your provider what is most appropriate for you during your annual wellness visit. The different tests include: colonoscopy (considered the best screening method), a fecal occult blood test, a fecal DNA test, and sigmoidoscopy. -Breast cancer screenings are recommended every other year for women of normal risk age 54-69 years.     -Cervical cancer screenings for women over age 72 are only recommended with certain risk factors.     -All adults born between [facility] should be screened once for Hepatitis C.      Here is a list of your current Health Maintenance items (your personalized list of preventive services) with a due date:  Health Maintenance Due   Topic Date Due    Diabetic Foot Care  06/27/2018    Annual Well Visit  06/28/2018

## 2018-07-12 NOTE — TELEPHONE ENCOUNTER
Insurance requires a minimum fill for 90 days. If appropriate, please sign pended medication order. If not, please notify me. Last Visit: 07/16/2018 with MD Lupillo Machado    Next Appointment: RTC 11/18    Requested Prescriptions     Pending Prescriptions Disp Refills    atorvastatin (LIPITOR) 40 mg tablet 90 Tab 1     Sig: Take 1 Tab by mouth daily.  gemfibrozil (LOPID) 600 mg tablet 90 Tab 1     Sig: Take 1 Tab by mouth daily.  clopidogrel (PLAVIX) 75 mg tab 90 Tab 1     Sig: Take 1 Tab by mouth daily.

## 2018-07-13 RX ORDER — CLOPIDOGREL BISULFATE 75 MG/1
75 TABLET ORAL DAILY
Qty: 90 TAB | Refills: 1 | Status: SHIPPED | OUTPATIENT
Start: 2018-07-13 | End: 2018-07-16 | Stop reason: SDUPTHER

## 2018-07-13 RX ORDER — ATORVASTATIN CALCIUM 40 MG/1
40 TABLET, FILM COATED ORAL DAILY
Qty: 90 TAB | Refills: 1 | Status: SHIPPED | OUTPATIENT
Start: 2018-07-13 | End: 2018-07-16 | Stop reason: SDUPTHER

## 2018-07-13 RX ORDER — GEMFIBROZIL 600 MG/1
600 TABLET, FILM COATED ORAL DAILY
Qty: 90 TAB | Refills: 1 | Status: SHIPPED | OUTPATIENT
Start: 2018-07-13 | End: 2018-07-16 | Stop reason: SINTOL

## 2018-07-16 ENCOUNTER — OFFICE VISIT (OUTPATIENT)
Dept: INTERNAL MEDICINE CLINIC | Age: 68
End: 2018-07-16

## 2018-07-16 VITALS
WEIGHT: 160 LBS | BODY MASS INDEX: 28.35 KG/M2 | OXYGEN SATURATION: 97 % | DIASTOLIC BLOOD PRESSURE: 78 MMHG | HEART RATE: 71 BPM | HEIGHT: 63 IN | TEMPERATURE: 98 F | SYSTOLIC BLOOD PRESSURE: 134 MMHG | RESPIRATION RATE: 14 BRPM

## 2018-07-16 DIAGNOSIS — Z00.00 MEDICARE ANNUAL WELLNESS VISIT, SUBSEQUENT: Primary | ICD-10-CM

## 2018-07-16 DIAGNOSIS — E78.5 DYSLIPIDEMIA: ICD-10-CM

## 2018-07-16 DIAGNOSIS — M94.9 DISORDER OF BONE AND CARTILAGE: ICD-10-CM

## 2018-07-16 DIAGNOSIS — Z71.89 ADVANCED DIRECTIVES, COUNSELING/DISCUSSION: ICD-10-CM

## 2018-07-16 DIAGNOSIS — R74.01 TRANSAMINASEMIA: ICD-10-CM

## 2018-07-16 DIAGNOSIS — Z12.31 ENCOUNTER FOR SCREENING MAMMOGRAM FOR MALIGNANT NEOPLASM OF BREAST: ICD-10-CM

## 2018-07-16 DIAGNOSIS — G45.9 TRANSIENT CEREBRAL ISCHEMIA, UNSPECIFIED TYPE: ICD-10-CM

## 2018-07-16 DIAGNOSIS — E55.9 HYPOVITAMINOSIS D: ICD-10-CM

## 2018-07-16 DIAGNOSIS — Z13.39 SCREENING FOR ALCOHOLISM: ICD-10-CM

## 2018-07-16 DIAGNOSIS — Z13.31 SCREENING FOR DEPRESSION: ICD-10-CM

## 2018-07-16 DIAGNOSIS — I65.21 SYMPTOMATIC STENOSIS OF RIGHT CAROTID ARTERY: ICD-10-CM

## 2018-07-16 DIAGNOSIS — I10 ESSENTIAL HYPERTENSION: ICD-10-CM

## 2018-07-16 DIAGNOSIS — M89.9 DISORDER OF BONE AND CARTILAGE: ICD-10-CM

## 2018-07-16 DIAGNOSIS — Z12.11 SCREEN FOR COLON CANCER: ICD-10-CM

## 2018-07-16 RX ORDER — GEMFIBROZIL 600 MG/1
600 TABLET, FILM COATED ORAL DAILY
Qty: 90 TAB | Refills: 1 | Status: CANCELLED | OUTPATIENT
Start: 2018-07-16

## 2018-07-16 RX ORDER — CLOPIDOGREL BISULFATE 75 MG/1
75 TABLET ORAL DAILY
Qty: 90 TAB | Refills: 3 | Status: SHIPPED | OUTPATIENT
Start: 2018-07-16 | End: 2019-07-31 | Stop reason: SDUPTHER

## 2018-07-16 RX ORDER — ATORVASTATIN CALCIUM 40 MG/1
40 TABLET, FILM COATED ORAL DAILY
Qty: 90 TAB | Refills: 3 | Status: SHIPPED | OUTPATIENT
Start: 2018-07-16 | End: 2020-01-13

## 2018-07-16 NOTE — PROGRESS NOTES
1. Have you been to the ER, urgent care clinic or hospitalized since your last visit? YES. May 8, 2018 [facility]    2. Have you seen or consulted any other health care providers outside of the [address] [place] [address] since your last visit (Include any pap smears or colon screening)? NO      Do you have an Advanced Directive? NO    Would you like information on Advanced Directives?  NO    Chief Complaint   Patient presents with    Annual Wellness Visit    Transitions Of Care     stenosis of cartoid artery

## 2018-07-16 NOTE — MR AVS SNAPSHOT
[address] [facility] 
 
 
 [address], [address] [facility] 
[phone] Patient: Gladis Carr MRN: [id number] OPQ:[id number] Visit Information Date & Time Provider Department Dept. Phone Encounter #  
 7/16/2018  9:00 AM Jose Alberto Vitale MD [facility] [address] [place] [address] [phone] Your Appointments 8/13/2018  8:00 AM  
LAB with IOC NURSE VISIT [facility] [address] [place] [address] ([facility]) Appt Note: lab  
 [address], [address] Margarita Maximiliano [address] [place]  
  
   
 [address], [address] Upcoming Health Maintenance Date Due  
 FOOT EXAM Q1 6/27/2018 Influenza Age 5 to Adult 8/1/2018 HEMOGLOBIN A1C Q6M 10/25/2018 EYE EXAM RETINAL OR DILATED Q1 11/8/2018 BREAST CANCER SCRN MAMMOGRAM 12/30/2018 MICROALBUMIN Q1 1/2/2019 LIPID PANEL Q1 4/11/2019 MEDICARE YEARLY EXAM 7/17/2019 GLAUCOMA SCREENING Q2Y 11/8/2019 DTaP/Tdap/Td series (2 - Td) 2/28/2022 COLONOSCOPY 12/2/2023 Allergies as of 7/16/2018  Review Complete On: 7/16/2018 By: Dania Marie LPN No Known Allergies Current Immunizations  Reviewed on 7/5/2018 Name Date Influenza High Dose Vaccine PF 12/27/2016 Influenza Vaccine 11/3/2017, 11/24/2015 Influenza Vaccine PF 12/18/2014 11:25 AM  
 Influenza Vaccine Whole 12/7/2012, 10/1/2009 Pneumococcal Conjugate (PCV-13) 12/29/2015  9:50 AM  
 Pneumococcal Polysaccharide (PPSV-23) 6/27/2017  8:46 AM  
 TDAP Vaccine 2/29/2012 ZZZ-RETIRED (DO NOT USE) Pneumococcal Vaccine (Unspecified Type) 2/29/2012 Zoster 6/21/2012 Not reviewed this visit You Were Diagnosed With   
  
 Codes Comments Medicare annual wellness visit, subsequent    -  Primary ICD-10-CM: Z00.00 ICD-9-CM: V70.0 Advanced directives, counseling/discussion     ICD-10-CM: Z71.89 ICD-9-CM: V65.49 Screening for alcoholism     ICD-10-CM: Z13.89 ICD-9-CM: V79.1 Screening for depression     ICD-10-CM: Z13.89 ICD-9-CM: V79.0 Screen for colon cancer     ICD-10-CM: Z12.11 ICD-9-CM: V76.51 Encounter for screening mammogram for malignant neoplasm of breast     ICD-10-CM: Z12.31 
ICD-9-CM: V76.12 Disorder of bone and cartilage     ICD-10-CM: M89.9, M94.9 ICD-9-CM: 733.90 Vitals BP Pulse Temp Resp Height(growth percentile) Weight(growth percentile) 140/74 71 98 °F (36.7 °C) (Oral) 14 5' 3\" (1.6 m) 160 lb (72.6 kg) SpO2 BMI OB Status Smoking Status 97% 28.34 kg/m2 Postmenopausal Never Smoker Vitals History BMI and BSA Data Body Mass Index Body Surface Area  
 28.34 kg/m 2 1.8 m 2 Preferred Pharmacy Pharmacy Name Phone [facility] [address] [facility] [phone] Your Updated Medication List  
  
   
This list is accurate as of 7/16/18  9:46 AM.  Always use your most recent med list.  
  
  
  
  
 aspirin 81 mg chewable tablet Take 81 mg by mouth daily. atorvastatin 40 mg tablet Commonly known as:  LIPITOR Take 1 Tab by mouth daily. BENICAR HCT 40-25 mg per tablet Generic drug:  olmesartan-hydroCHLOROthiazide Take 1 Tab by mouth daily. clopidogrel 75 mg Tab Commonly known as:  PLAVIX Take 1 Tab by mouth daily. COREG 12.5 mg tablet Generic drug:  carvedilol Take 12.5 mg by mouth daily. gemfibrozil 600 mg tablet Commonly known as:  LOPID Take 1 Tab by mouth daily. insulin lispro protamine/insulin lispro 100 unit/mL (75-25) injection Commonly known as:  HUMALOG MIX 75/25  
14 Units by SubCUTAneous route two (2) times a day. Indications: type 2 diabetes mellitus, 16 units  before breakast and dinner INVOKANA 100 mg tablet Generic drug:  canagliflozin Take 100 mg by mouth daily. Indications: type 2 diabetes mellitus metFORMIN 1,000 mg tablet Commonly known as:  GLUCOPHAGE Take 1,000 mg by mouth two (2) times daily (with meals). NORVASC 10 mg tablet Generic drug:  amLODIPine Take  by mouth daily. VITAMIN D 5,000 unit Tab tablet Generic drug:  cholecalciferol (VITAMIN D3) Take 2,000 Units by mouth. Takes 12 units am and 14 units pm  
  
  
  
  
We Performed the Following ADVANCE CARE PLANNING FIRST 30 MINS [73735 CPT(R)] [facility] [[id number] \[facility] [place] [facility]\""] [facility] ANNUAL ALCOHOL SCREEN 15 MIN [id number] \[facility] [place] [facility]\""] To-Do List   
 07/12/2018 Imaging:  [facility] MAMMO BI SCREENING INCL CAD Patient Instructions Medicare Wellness Visit, Female The best way to live healthy is to have a lifestyle where you eat a well-balanced diet, exercise regularly, limit alcohol use, and quit all forms of tobacco/nicotine, if applicable. Regular preventive services are another way to keep healthy. Preventive services (vaccines, screening tests, monitoring & exams) can help personalize your care plan, which helps you manage your own care. Screening tests can find health problems at the earliest stages, when they are easiest to treat. [facility] follows the current, evidence-based guidelines published by the [place] Jadiel Parikh (USPSTF) when recommending preventive services for our patients. Because we follow these guidelines, sometimes recommendations change over time as research supports it. (For example, mammograms used to be recommended annually. Even though Medicare will still pay for an annual mammogram, the newer guidelines recommend a mammogram every two years for women of average risk.) Of course, you and your provider may decide to screen more often for some diseases, based on your risk and co-morbidities (chronic disease you are already diagnosed with). Preventive services for you include: - Medicare offers their members a free annual wellness visit, which is time for you and your primary care provider to discuss and plan for your preventive service needs. Take advantage of this benefit every year! 
 
-All people over age 72 should receive the recommended pneumonia vaccines. Current USPSTF guidelines recommend a series of two vaccines for the best pneumonia protection.  
 
-All adults should have a yearly flu vaccine and a tetanus vaccine every 10 years. All adults age 61 years should receive a shingles vaccine once in their lifetime.   
 
-A bone mass density test is recommended when a woman turns 65 to screen for osteoporosis. This test is only recommended once as a screening. Some providers will use this same test as a disease monitoring tool if you already have osteoporosis. -All adults age 38-68 years who are overweight should have a diabetes screening test once every three years.  
 
-Other screening tests & preventive services for persons with diabetes include: an eye exam to screen for diabetic retinopathy, a kidney function test, a foot exam, and stricter control over your cholesterol.  
 
-Cardiovascular screening for adults with routine risk involves an electrocardiogram (ECG) at intervals determined by the provider.  
 
-Colorectal cancer screenings should be done for adults age 54-65 years with normal risk. There are a number of acceptable methods of screening for this type of cancer. Each test has its own benefits and drawbacks. Discuss with your provider what is most appropriate for you during your annual wellness visit. The different tests include: colonoscopy (considered the best screening method), a fecal occult blood test, a fecal DNA test, and sigmoidoscopy. -Breast cancer screenings are recommended every other year for women of normal risk age 54-69 years.  
 
-Cervical cancer screenings for women over age 72 are only recommended with certain risk factors. -All adults born between [facility] should be screened once for Hepatitis C. Here is a list of your current Health Maintenance items (your personalized list of preventive services) with a due date: 
Health Maintenance Due Topic Date Due  
 Diabetic Foot Care  06/27/2018 [facility] Annual Well Visit  06/28/2018 Introducing [facility]! [facility] introduces [employer] patient portal. Now you can access parts of your medical record, email your doctor's office, and request medication refills online. 1. In your internet browser, go to [email or web address] 2. Click on the First Time User? Click Here link in the Sign In box. You will see the New Member Sign Up page. 3. Enter your [employer] Access Code exactly as it appears below. You will not need to use this code after youve completed the sign-up process. If you do not sign up before the expiration date, you must request a new code. · [employer] Access Code: [email or web address] Expires: 8/6/2018 12:03 PM 
 
4. Enter the last four digits of your Social Security Number (xxxx) and Date of Birth (mm/dd/yyyy) as indicated and click Submit. You will be taken to the next sign-up page. 5. Create a [employer] ID. This will be your [employer] login ID and cannot be changed, so think of one that is secure and easy to remember. 6. Create a [employer] password. You can change your password at any time. 7. Enter your Password Reset Question and Answer. This can be used at a later time if you forget your password. 8. Enter your e-mail address. You will receive e-mail notification when new information is available in [address] 9. Click Sign Up. You can now view and download portions of your medical record. 10. Click the Download Summary menu link to download a portable copy of your medical information. If you have questions, please visit the Frequently Asked Questions section of the [employer] website.  Remember, [employer] is NOT to be used for urgent needs. For medical emergencies, dial 911. Now available from your iPhone and Android! Please provide this summary of care documentation to your next provider. Your primary care clinician is listed as Jesika Rolon. If you have any questions after today's visit, please call [phone].

## 2018-07-17 ENCOUNTER — TELEPHONE (OUTPATIENT)
Dept: INTERNAL MEDICINE CLINIC | Age: 68
End: 2018-07-17

## 2018-07-17 NOTE — TELEPHONE ENCOUNTER
----- Message from Patria Saint, MD sent at 7/16/2018  6:05 PM EDT -----  pls call [facility] and get records if seen after mid April -unclear as to who saw her

## 2018-08-13 ENCOUNTER — APPOINTMENT (OUTPATIENT)
Dept: INTERNAL MEDICINE CLINIC | Age: 68
End: 2018-08-13

## 2018-08-13 ENCOUNTER — HOSPITAL ENCOUNTER (OUTPATIENT)
Dept: LAB | Age: 68
Discharge: HOME | End: 2018-08-13
Payer: MEDICARE

## 2018-08-13 DIAGNOSIS — R74.01 TRANSAMINASEMIA: ICD-10-CM

## 2018-08-13 LAB
ALBUMIN SERPL-MCNC: 3.7 G/DL (ref 3.4–5)
ALBUMIN/GLOB SERPL: 1 {RATIO} (ref 0.8–1.7)
ALP SERPL-CCNC: 136 U/L (ref 45–117)
ALT SERPL-CCNC: 26 U/L (ref 13–56)
AST SERPL-CCNC: 13 U/L (ref 15–37)
BILIRUB DIRECT SERPL-MCNC: 0.1 MG/DL (ref 0–0.2)
BILIRUB SERPL-MCNC: 0.4 MG/DL (ref 0.2–1)
GLOBULIN SER CALC-MCNC: 3.8 G/DL (ref 2–4)
PROT SERPL-MCNC: 7.5 G/DL (ref 6.4–8.2)

## 2018-08-13 PROCEDURE — 36415 COLL VENOUS BLD VENIPUNCTURE: CPT | Performed by: INTERNAL MEDICINE

## 2018-08-13 PROCEDURE — 80076 HEPATIC FUNCTION PANEL: CPT | Performed by: INTERNAL MEDICINE

## 2018-08-14 ENCOUNTER — TELEPHONE (OUTPATIENT)
Dept: INTERNAL MEDICINE CLINIC | Age: 68
End: 2018-08-14

## 2018-08-14 NOTE — TELEPHONE ENCOUNTER
pls call    AST/ALT have normalized  Stop lopid forever    We can try fenofibrate instead if she wishes  If she does, will need liver recheck in 1 mo

## 2018-08-14 NOTE — TELEPHONE ENCOUNTER
Spoke with patient, she is aware of message below, she said she will wait it out for now    She also said she had nuclear stress test on 7/27, she was wondering if you got results? She doesn't remember who ordered it, it was done on [facility] in [place], Dr Titi Ryan recommended it? Maybe ordered?

## 2018-08-14 NOTE — TELEPHONE ENCOUNTER
pls call    ALT and ALT have normalized  Stop gemfibrozil/lopid forever    If she wishes, we can try fenofibrate insated  If interested, will call in and need lfts rechecked in 1 mo

## 2018-09-11 DIAGNOSIS — Z12.31 ENCOUNTER FOR SCREENING MAMMOGRAM FOR MALIGNANT NEOPLASM OF BREAST: ICD-10-CM

## 2019-04-04 LAB
CREATININE, EXTERNAL: 0.98
LDL-C, EXTERNAL: 65

## 2019-06-19 LAB — CREATININE, EXTERNAL: 0.86

## 2019-07-31 DIAGNOSIS — G45.9 TRANSIENT CEREBRAL ISCHEMIA, UNSPECIFIED TYPE: ICD-10-CM

## 2019-07-31 RX ORDER — CLOPIDOGREL BISULFATE 75 MG/1
TABLET ORAL
Qty: 90 TAB | Refills: 3 | Status: SHIPPED | OUTPATIENT
Start: 2019-07-31 | End: 2020-05-20

## 2019-09-12 ENCOUNTER — HOSPITAL ENCOUNTER (OUTPATIENT)
Dept: LAB | Age: 69
Discharge: HOME | End: 2019-09-12
Payer: MEDICARE

## 2019-09-12 ENCOUNTER — LAB ONLY (OUTPATIENT)
Dept: INTERNAL MEDICINE CLINIC | Age: 69
End: 2019-09-12

## 2019-09-12 DIAGNOSIS — E78.5 DYSLIPIDEMIA: ICD-10-CM

## 2019-09-12 DIAGNOSIS — I10 ESSENTIAL HYPERTENSION: ICD-10-CM

## 2019-09-12 LAB
ALBUMIN SERPL-MCNC: 3.5 G/DL (ref 3.4–5)
ALBUMIN/GLOB SERPL: 1.1 {RATIO} (ref 0.8–1.7)
ALP SERPL-CCNC: 121 U/L (ref 45–117)
ALT SERPL-CCNC: 20 U/L (ref 13–56)
ANION GAP SERPL CALC-SCNC: 8 MMOL/L (ref 3–18)
AST SERPL-CCNC: 8 U/L (ref 10–38)
BASOPHILS # BLD: 0 K/UL (ref 0–0.1)
BASOPHILS NFR BLD: 0 % (ref 0–2)
BILIRUB SERPL-MCNC: 0.3 MG/DL (ref 0.2–1)
BUN SERPL-MCNC: 20 MG/DL (ref 7–18)
BUN/CREAT SERPL: 18 (ref 12–20)
CALCIUM SERPL-MCNC: 9 MG/DL (ref 8.5–10.1)
CHLORIDE SERPL-SCNC: 104 MMOL/L (ref 100–111)
CO2 SERPL-SCNC: 26 MMOL/L (ref 21–32)
CREAT SERPL-MCNC: 1.11 MG/DL (ref 0.6–1.3)
DIFFERENTIAL METHOD BLD: ABNORMAL
EOSINOPHIL # BLD: 0.2 K/UL (ref 0–0.4)
EOSINOPHIL NFR BLD: 3 % (ref 0–5)
ERYTHROCYTE [DISTWIDTH] IN BLOOD BY AUTOMATED COUNT: 13.6 % (ref 11.6–14.5)
EST. AVERAGE GLUCOSE BLD GHB EST-MCNC: 263 MG/DL
GLOBULIN SER CALC-MCNC: 3.1 G/DL (ref 2–4)
GLUCOSE SERPL-MCNC: 218 MG/DL (ref 74–99)
HBA1C MFR BLD: 10.8 % (ref 4.2–5.6)
HCT VFR BLD AUTO: 42.7 % (ref 35–45)
HGB BLD-MCNC: 13.5 G/DL (ref 12–16)
LYMPHOCYTES # BLD: 1.3 K/UL (ref 0.9–3.6)
LYMPHOCYTES NFR BLD: 20 % (ref 21–52)
MCH RBC QN AUTO: 26.8 PG (ref 24–34)
MCHC RBC AUTO-ENTMCNC: 31.6 G/DL (ref 31–37)
MCV RBC AUTO: 84.7 FL (ref 74–97)
MONOCYTES # BLD: 0.4 K/UL (ref 0.05–1.2)
MONOCYTES NFR BLD: 6 % (ref 3–10)
NEUTS SEG # BLD: 4.9 K/UL (ref 1.8–8)
NEUTS SEG NFR BLD: 71 % (ref 40–73)
PLATELET # BLD AUTO: 260 K/UL (ref 135–420)
PMV BLD AUTO: 12.1 FL (ref 9.2–11.8)
POTASSIUM SERPL-SCNC: 4.2 MMOL/L (ref 3.5–5.5)
PROT SERPL-MCNC: 6.6 G/DL (ref 6.4–8.2)
RBC # BLD AUTO: 5.04 M/UL (ref 4.2–5.3)
SODIUM SERPL-SCNC: 138 MMOL/L (ref 136–145)
WBC # BLD AUTO: 6.9 K/UL (ref 4.6–13.2)

## 2019-09-12 PROCEDURE — 80053 COMPREHEN METABOLIC PANEL: CPT

## 2019-09-12 PROCEDURE — 85025 COMPLETE CBC W/AUTO DIFF WBC: CPT

## 2019-09-12 PROCEDURE — 83036 HEMOGLOBIN GLYCOSYLATED A1C: CPT

## 2019-09-12 PROCEDURE — 80061 LIPID PANEL: CPT

## 2019-09-12 PROCEDURE — 82043 UR ALBUMIN QUANTITATIVE: CPT

## 2019-09-12 PROCEDURE — 36415 COLL VENOUS BLD VENIPUNCTURE: CPT

## 2019-09-13 LAB
CHOLEST SERPL-MCNC: 110 MG/DL
CREAT UR-MCNC: 456 MG/DL (ref 30–125)
HDLC SERPL-MCNC: 30 MG/DL (ref 40–60)
HDLC SERPL: 3.7 {RATIO} (ref 0–5)
LDLC SERPL CALC-MCNC: 54.6 MG/DL (ref 0–100)
LIPID PROFILE,FLP: ABNORMAL
MICROALBUMIN UR-MCNC: 7.84 MG/DL (ref 0–3)
MICROALBUMIN/CREAT UR-RTO: 17 MG/G (ref 0–30)
TRIGL SERPL-MCNC: 127 MG/DL (ref ?–150)
VLDLC SERPL CALC-MCNC: 25.4 MG/DL

## 2019-09-16 NOTE — PROGRESS NOTES
This is a subsequent medicare wellness visit    I have reviewed the patient's medical history in detail and updated the computerized patient record.      History     Past Medical History:   Diagnosis Date    Calculus of kidney 6/16    Dr Forest Scott; right ureteral stone s/p EWSL    Diabetes ([facility] [address]     Dr Papa Watt GERD (gastroesophageal reflux disease)     Hyperlipidemia     Hypertension     Hypovitaminosis D     Stroke ([facility] [address]     last stroke 4/2018    TIA (transient ischemic attack)       Past Surgical History:   Procedure Laterality Date    CARDIAC SURG PROCEDURE UNLIST  01/2018    holter few pac/pvc, 4 beat run svt rate [place]  08/2018    Dr Gayle Jones; NST neg, ef 76%    ECHO 2D ADULT  11/2017    conc lvh, ef 60%, DD (3/12); nl lv, ef 68%, conc lvh, no wma, mild dd, no valvular abn, neg bubble study (11/17 - [address])    HX BREAST BIOPSY  2009    negative    HX COLONOSCOPY  12/13    negative Dr Kierra Torres t score 1.10 spine, 1.10 hip (11/13)    HX UROLOGICAL Right 06/10/2016    R ESWL-Dr. Candy Kern- CRSCVB    NEUROLOGICAL PROCEDURE UNLISTED  11/2017    mri showed mod periventricular wm changes    STRESS TEST THALLIUM STUDY  3/12    negative w EF 70%    VASCULAR SURGERY PROCEDURE UNLIST  11/2017    carotids showed bilat <50% ICA, right vertebral art obst    VASCULAR SURGERY PROCEDURE UNLIST  12/2017    CTA head/neck showed no aneurysm, <50% right intracranial ICA, diminutive right vertebral art    VASCULAR SURGERY PROCEDURE UNLIST  04/2018    JUAN R 1.0, L 1.1    VASCULAR SURGERY PROCEDURE UNLIST  04/2018    MRA right vert artery not visualized due to occlusion/stenosis, left patent, prox PAIGE 50-60% stenosis     Current Outpatient Medications   Medication Sig Dispense Refill    NOVOLIN R REGULAR U-100 INSULN 100 unit/mL injection 7 Units by SubCUTAneous route.  0    NOVOLIN N NPH U-100 INSULIN 100 unit/mL injection inject 20 units subcutaneously every morning  0    pioglitazone (ACTOS) 15 mg tablet Take 15 mg by mouth daily.  carvedilol (COREG) 25 mg tablet Take 1 Tab by mouth two (2) times daily (with meals). 180 Tab 3    clopidogrel (PLAVIX) 75 mg tab take 1 tablet by mouth once daily 90 Tab 3    folic acid/multivit-min/lutein (CENTRUM SILVER PO) Take  by mouth.  atorvastatin (LIPITOR) 40 mg tablet Take 1 Tab by mouth daily. 90 Tab 3    metFORMIN (GLUCOPHAGE) 1,000 mg tablet Take 1,000 mg by mouth two (2) times daily (with meals).  amLODIPine (NORVASC) 10 mg tablet Take  by mouth daily.  olmesartan-hydrochlorothiazide (BENICAR HCT) 40-25 mg per tablet Take 1 Tab by mouth daily.  Cholecalciferol, Vitamin D3, (VITAMIN D) 5,000 unit Tab Take 2,000 Units by mouth. Takes 12 units am and 14 units pm      calcium carbonate (CALTREX) 600 mg calcium (1,500 mg) tablet Take 600 mg by mouth two (2) times a day.  insulin lispro protamine/insulin lispro (HUMALOG MIX 75/25) 100 unit/mL (75-25) injection 14 Units by SubCUTAneous route two (2) times a day. Indications: type 2 diabetes mellitus, 16 units  before breakast and dinner       Allergies   Allergen Reactions    Lopid [Gemfibrozil] Other (comments)     elev lfts    Penicillins Shortness of Breath and Nausea and Vomiting     Family History   Problem Relation Age of Onset    Diabetes Father     Hypertension Father     Cancer Sister     Hypertension Sister      Social History     Tobacco Use    Smoking status: Never Smoker    Smokeless tobacco: Never Used   Substance Use Topics    Alcohol use:  Yes     Alcohol/week: 2.0 standard drinks     Types: 1 Glasses of wine, 1 Cans of beer per week     Comment: social glass once a year     Diet, Lifestyle: generally follows a low fat low cholesterol diet, generally follows a low sodium diet, follows a diabetic diet regularly, exercises sporadically    Exercise level: not active    Depression Risk Screen     3 most recent PHQ Screens 9/19/2019   PHQ Not Done -   Little interest or pleasure in doing things Not at all   Feeling down, depressed, irritable, or hopeless Not at all   Total Score PHQ 2 0     SCREENINGS  Colonoscopy last done 12/13 Dr Brigid Blackburn last done 9/19  DEXA last done 11/13  Dr Taj Starr last done >5 yrs    Immunization History   Administered Date(s) Administered    (RETIRED) Pneumococcal Vaccine (Unspecified Type) 02/29/2012    Influenza High Dose Vaccine PF 12/27/2016    Influenza Vaccine 11/24/2015, 11/03/2017    Influenza Vaccine (Tri) Adjuvanted 09/19/2019    Influenza Vaccine PF 12/18/2014    Influenza Vaccine Whole 10/01/2009, 12/07/2012    Pneumococcal Conjugate (PCV-13) 12/29/2015    Pneumococcal Polysaccharide (PPSV-23) 06/27/2017    TDAP Vaccine 02/29/2012    Zoster 06/21/2012     Alcohol Risk Screen   On any occasion during the past 3 months, have you had more than 3 drinks containing alcohol? No    Do you average more than 7 drinks per week? No    Functional Ability and Level of Safety     Hearing Loss   normal-to-mild    Vision - no acute complaints and is followed by Dr Giovani Ambriz [facility]   Self-care     Fall Risk Screen     Fall Risk Assessment, last 12 mths 9/19/2019   Able to walk? Yes   Fall in past 12 months? No   Fall with injury? -   Number of falls in past 12 months -   Fall Risk Score -     Abuse Screen   Patient is not abused    Review of Systems   A comprehensive review of systems was negative except for that written in the HPI.     Physical Examination     Visit Vitals  /71   Pulse 71   Temp 98.1 °F (36.7 °C) (Oral)   Resp 14   Ht 5' 3\" (1.6 m)   Wt 166 lb (75.3 kg)   SpO2 97%   BMI 29.41 kg/m²       Patient Care Team:  Luna Sam MD as PCP - General (Internal Medicine)  Babak Martinez, RN as Ambulatory Care Navigator (Internal Medicine)  Arielle Daniel MD (Ophthalmology)  Alexa Yanez, CASSANDRA as Ambulatory Care Navigator (Internal Medicine)  Annelise Garcia NP     End-of-life planning  Advanced Directive discussed and documented: YES    Assessment/Plan   Education and counseling provided:  Are appropriate based on today's review and evaluation       ICD-10-CM ICD-9-CM    1. Medicare annual wellness visit, subsequent Z00.00 V70.0    2. Encounter for immunization Z23 V03.89 INFLUENZA VACCINE INACTIVATED (IIV), SUBUNIT, ADJUVANTED, IM      ADMIN INFLUENZA VIRUS VAC   3. Essential hypertension I10 401.9 carvedilol (COREG) 25 mg tablet   4. Type 2 diabetes with nephropathy (HCC) E11.21 250.40      583.81    5. Uncontrolled type 2 diabetes mellitus without complication, with long-term current use of insulin NP Tony Torres E11.65 250.02     Z79.4 V58.67    6. TIA (transient ischemic attack) G45.9 435.9    7. Symptomatic stenosis of right carotid artery I65.21 433.10 REFERRAL TO VASCULAR CENTER   8. Hypovitaminosis D E55.9 268.9    9. GERD without esophagitis K21.9 530.81    10. Dyslipidemia E78.5 272.4    11. Pain of right lower extremity M79.604 729.5 REFERRAL TO ORTHOPEDICS   12. Advanced directives, counseling/discussion Z71.89 V65.49 ADVANCE CARE PLANNING FIRST 30 MINS   13. Screening for alcoholism Z13.39 V79.1 GA ANNUAL ALCOHOL SCREEN 15 MIN   14. Screening for depression Z13.31 V79.0 Lyudmila Hanson     current treatment plan is effective, no change in therapy  lab results and schedule of future lab studies reviewed with patient  reviewed diet, exercise and weight control  cardiovascular risk and specific lipid/LDL goals reviewed  use of aspirin to prevent MI and TIA's discussed. End of life discussion undertaken.   She will work on getting medical directive in place  Colonoscopy to be scheduled 2023  Mammo to be done 2020  DEXA to be scheduled per Dr Joan mayo advocated

## 2019-09-16 NOTE — PROGRESS NOTES
71 y.o. BLACK OR  female who presents for evaluation. We have not seen her in over a year    She continues to see P Nadine Duque. Denies polyuria, polydipsia, nocturia, vision change. She was changed over form mixed insulin to basal bolus and pioglitzone in mid summer apparently and she's been getting readings in the low 200 range. Has f/u in early October    Reports no cardiovascular complaints. She reports that her bp has been running in the 140+ range when she checks. She underwent angiogram by Dr Liborio Wilkins and he was recommending PAIGE procedure but then he retired and pt never f/u with the physician that took over the practice Dr Juan Dang. She has not had f/u imaging since. No new neurologic complaints    No GI or Gu complaints. We stopped the lopid after the last admission due to elev lfts but she did not follow up    She is interested in seeing ortho about this vague pain she's been having in the RLE. No evidence of pad at the last check 4/18, reports no claudication. Denied any hip or avctual knee pain or limitaiton. Just a nagging intermittent pain in the distal thigh to upper calf. No back pain or radicular complaints.  No rash, swelling, injury or bruising    LAST MEDICARE WELLNESS EXAM: 12/29/15, 6/27/17, 7/16/18, 9/19/19    Past Medical History:   Diagnosis Date    Calculus of kidney 6/16    Dr Juanita Liu; right ureteral stone s/p EWSL    Diabetes ([facility] [address]     Dr Cherise Hinojosa GERD (gastroesophageal reflux disease)     Hyperlipidemia     Hypertension     Hypovitaminosis D     TIA (transient ischemic attack) 04/2018     Past Surgical History:   Procedure Laterality Date    CARDIAC SURG PROCEDURE UNLIST  01/2018    holter few pac/pvc, 4 beat run svt rate 156    CARDIAC SURG PROCEDURE UNLIST  08/2018    Dr Aspen Quan; NST neg, ef 76%    ECHO 2D ADULT  11/2017    conc lvh, ef 60%, DD (3/12); nl lv, ef 68%, conc lvh, no wma, mild dd, no valvular abn, neg bubble study (11/17 - [id number])    HX BREAST BIOPSY  2009    negative    HX COLONOSCOPY  12/13    negative Dr Maribell Asher t score 1.10 spine, 1.10 hip (11/13)    HX UROLOGICAL Right 06/10/2016    R ESWL-Dr. Miles Gonzales- [facility]    NEUROLOGICAL PROCEDURE UNLISTED  11/2017    mri showed mod periventricular wm changes    STRESS TEST THALLIUM STUDY  3/12    negative w EF 70%    VASCULAR SURGERY PROCEDURE UNLIST  11/2017    carotids showed bilat <50% ICA, right vertebral art obst    VASCULAR SURGERY PROCEDURE UNLIST  12/2017    CTA head/neck showed no aneurysm, <50% right intracranial ICA, diminutive right vertebral art    VASCULAR SURGERY PROCEDURE UNLIST  04/2018    JUAN R 1.0, L 1.1    VASCULAR SURGERY PROCEDURE UNLIST  04/2018    MRA right vert artery not visualized due to occlusion/stenosis, left patent, prox PAIGE 50-60% stenosis     Social History     Socioeconomic History    Marital status:      Spouse name: Not on file    Number of children: Not on file    Years of education: Not on file    Highest education level: Not on file   Occupational History    Occupation: retired teacher [employer] [place]   Social Needs    Financial resource strain: Not on file    Food insecurity:     Worry: Not on file     Inability: Not on file   [employer] needs:     Medical: Not on file     Non-medical: Not on file   Tobacco Use    Smoking status: Never Smoker    Smokeless tobacco: Never Used   Substance and Sexual Activity    Alcohol use:  Yes     Alcohol/week: 2.0 standard drinks     Types: 1 Glasses of wine, 1 Cans of beer per week     Comment: social glass once a year    Drug use: No    Sexual activity: Not on file   Lifestyle    Physical activity:     Days per week: Not on file     Minutes per session: Not on file    Stress: Not on file   Relationships    Social connections:     Talks on phone: Not on file     Gets together: Not on file     Attends Holiness service: Not on file     Active member of club or organization: Not on file     Attends meetings of clubs or organizations: Not on file     Relationship status: Not on file    Intimate partner violence:     Fear of current or ex partner: Not on file     Emotionally abused: Not on file     Physically abused: Not on file     Forced sexual activity: Not on file   Other Topics Concern    Not on file   Social History Narrative    Not on file     Allergies   Allergen Reactions    Lopid [Gemfibrozil] Other (comments)     elev lfts    Penicillins Shortness of Breath and Nausea and Vomiting       Current Outpatient Medications   Medication Sig    NOVOLIN R REGULAR U-100 INSULN 100 unit/mL injection 7 Units by SubCUTAneous route.  NOVOLIN N NPH U-100 INSULIN 100 unit/mL injection inject 20 units subcutaneously every morning    pioglitazone (ACTOS) 15 mg tablet Take 15 mg by mouth daily.  carvedilol (COREG) 25 mg tablet Take 1 Tab by mouth two (2) times daily (with meals).  clopidogrel (PLAVIX) 75 mg tab take 1 tablet by mouth once daily    folic acid/multivit-min/lutein (CENTRUM SILVER PO) Take  by mouth.  atorvastatin (LIPITOR) 40 mg tablet Take 1 Tab by mouth daily.  metFORMIN (GLUCOPHAGE) 1,000 mg tablet Take 1,000 mg by mouth two (2) times daily (with meals).  amLODIPine (NORVASC) 10 mg tablet Take  by mouth daily.  olmesartan-hydrochlorothiazide (BENICAR HCT) 40-25 mg per tablet Take 1 Tab by mouth daily.  Cholecalciferol, Vitamin D3, (VITAMIN D) 5,000 unit Tab Take 2,000 Units by mouth. Takes 12 units am and 14 units pm     No current facility-administered medications for this visit.       REVIEW OF SYSTEMS: gyn>5 yrs, mammo 9/19, DEXA 11/13, sees Dr Harshal France, no podiatry, colo 12/13 Dr Keila Doherty  Ophtho - no vision change or eye pain  Oral - no mouth pain, tongue or tooth problems  Ears - no hearing loss, ear pain, fullness, no swallowing problems  Cardiac - no CP, PND, orthopnea, edema, palpitations or syncope  Chest - no breast masses  Resp - no wheezing, chronic coughing, dyspnea  GI - no heartburn, nausea, vomiting, change in bowel habits, bleeding, hemorrhoids  Urinary - no dysuria, hematuria, flank pain, urgency, frequency  Genitals - no genital lesions, discharge, masses, ulceration, warts  Ortho - no swelling, dec ROM, myalgias    Visit Vitals  /71   Pulse 71   Temp 98.1 °F (36.7 °C) (Oral)   Resp 14   Ht 5' 3\" (1.6 m)   Wt 166 lb (75.3 kg)   SpO2 97%   BMI 29.41 kg/m²     A&O x3  Affect is appropriate. Mood stable  No apparent distress  Anicteric, no JVD, adenopathy or thyromegaly. No carotid bruits or radiated murmur  Lungs clear to auscultation, no wheezes or rales  Heart showed regular rate and rhythm. No murmur, rubs, gallops  Abdomen soft nontender, no hepatosplenomegaly or masses.    Ext without c/c/e    LABS  From 2/12 showed   gluc 330, cr 0.93, gfr 77,  alt 30, hba1c 11.2,                   chol 199, tg 366, hdl 35, ldl-c 67,   wbc 7.1, hb 15.2, plt 273,   tsh 2.20  From 6/12 showed                   hba1c 8.2,   ldl-p 1018, chol 112, tg 95,   hdl 32, ldl-c 61,   umar 1.5  From 12/12 showed                   hba1c 8.9,   ldl-p 1486, chol 132, tg 109, hdl 37, ldl-c 73  From 6/13 showed   gluc 128, cr 0.91, gfr 68,  alt 13, hba1c 7.8,                  chol 115, tg 157, hdl 28, ldl-c 56  From 12/13 showed                   hba1c 7.5,                  wbc 7.5, hb 13.2, plt 297,  vit d 69.2  From 6/14 showed   gluc 171, cr 0.85, gfr>60, alt<5,        chol 134, tg 165, hdl 26, ldl-c 73  From 12/14 showed        hba1c 9.2,       chol 151, tg 256, hdl 33, ldl-c 67,   umar 4.0  From 3/15 showed       hba1c 7.3,              ldl-c 82,  umar neg  From 7/15 showed   gluc 130, cr 1.16, gfr 49,  alt 15, hba1c 7.5,       chol 128, tg 271, hdl 29, ldl-c 45  From 12/15 showed        hba1c 9.6,        chol 160, tg 218, hdl 33, ldl-c 83,   umar 7.0  From 2/16 showed   gluc 117, cr 1.15, gfr 58,  alt 18, hba1c 7.9,       chol 112, tg 184, hdl 30, ldl-c 45,   umar 4.3,          vit d 45.3, uric 7.2  From 6/16 showed   gluc 98      hba1c 7.7,       chol 143, tg 178, hdl 31, ldl-c 76  From 12/16 showed gluc 170, cr 1.11, gfr 60,    hba1c 8.9,                  umar 4.0  From 6/17 showed   gluc 148, cr 1.06, gfr>60,    hba1c 8.1,       chol 192, tg 202, hdl 39, ldl-c 113, wbc 6.3, hb 13.8, plt 281  From 1/18 showed   gluc 154, cr 1.08, gfr>60, alt 11, hba1c 8.0,                  umar 16.0,        vit d 40.7  From 4/18 showed   gluc 140, cr 0.90, gfr>60,    hba1c 8.5,       chol 216, tg 356, hdl 31, ldl-c na   wbc 7.0, hb 13.1, plt 244, ck/trop neg  From 5/18 showed        hba1c 8.1  From 4/19 showed   gluc 237, cr 0.98, gfr 69,  alt 14,        chol 121, tg 135, hdl 29, ldl-c 65    Results for orders placed or performed during the hospital encounter of 09/12/19   MICROALBUMIN, UR, RAND W/ MICROALB/CREAT RATIO   Result Value Ref Range    Microalbumin,urine random 7.84 (H) 0 - 3.0 MG/DL    Creatinine, urine 456.00 (H) 30 - 125 mg/dL    Microalbumin/Creat ratio (mg/g creat) 17 0 - 30 mg/g   HEMOGLOBIN A1C WITH EAG   Result Value Ref Range    Hemoglobin A1c 10.8 (H) 4.2 - 5.6 %    Est. average glucose 263 mg/dL   CBC WITH AUTOMATED DIFF   Result Value Ref Range    WBC 6.9 4.6 - 13.2 K/uL    RBC 5.04 4.20 - 5.30 M/uL    HGB 13.5 12.0 - 16.0 g/dL    HCT 42.7 35.0 - 45.0 %    MCV 84.7 74.0 - 97.0 FL    MCH 26.8 24.0 - 34.0 PG    MCHC 31.6 31.0 - 37.0 g/dL    RDW 13.6 11.6 - 14.5 %    PLATELET 793 946 - 510 K/uL    MPV 12.1 (H) 9.2 - 11.8 FL    NEUTROPHILS 71 40 - 73 %    LYMPHOCYTES 20 (L) 21 - 52 %    MONOCYTES 6 3 - 10 %    EOSINOPHILS 3 0 - 5 %    BASOPHILS 0 0 - 2 %    ABS. NEUTROPHILS 4.9 1.8 - 8.0 K/UL    ABS. LYMPHOCYTES 1.3 0.9 - 3.6 K/UL    ABS. MONOCYTES 0.4 0.05 - 1.2 K/UL    ABS. EOSINOPHILS 0.2 0.0 - 0.4 K/UL    ABS.  BASOPHILS 0.0 0.0 - 0.1 K/UL    DF AUTOMATED     METABOLIC PANEL, COMPREHENSIVE   Result Value Ref Range    Sodium 138 136 - 145 mmol/L    Potassium 4.2 3.5 - 5.5 mmol/L    Chloride 104 100 - 111 mmol/L    CO2 26 21 - 32 mmol/L    Anion gap 8 3.0 - 18 mmol/L    Glucose 218 (H) 74 - 99 mg/dL    BUN 20 (H) 7.0 - 18 MG/DL    Creatinine 1.11 0.6 - 1.3 MG/DL    BUN/Creatinine ratio 18 12 - 20      GFR est AA 59 (L) >60 ml/min/1.73m2    GFR est non-AA 49 (L) >60 ml/min/1.73m2    Calcium 9.0 8.5 - 10.1 MG/DL    Bilirubin, total 0.3 0.2 - 1.0 MG/DL    ALT (SGPT) 20 13 - 56 U/L    AST (SGOT) 8 (L) 10 - 38 U/L    Alk. phosphatase 121 (H) 45 - 117 U/L    Protein, total 6.6 6.4 - 8.2 g/dL    Albumin 3.5 3.4 - 5.0 g/dL    Globulin 3.1 2.0 - 4.0 g/dL    A-G Ratio 1.1 0.8 - 1.7     LIPID PANEL   Result Value Ref Range    LIPID PROFILE          Cholesterol, total 110 <200 MG/DL    Triglyceride 127 <150 MG/DL    HDL Cholesterol 30 (L) 40 - 60 MG/DL    LDL, calculated 54.6 0 - 100 MG/DL    VLDL, calculated 25.4 MG/DL    CHOL/HDL Ratio 3.7 0 - 5.0       Patient Active Problem List   Diagnosis Code    Hypovitaminosis D E55.9    Dyslipidemia E78.5    Essential hypertension I10    GERD without esophagitis K21.9    Advance directive discussed with patient Z70.80    Nephrolithiasis s/p EWSL Dr Bisi Bhatia 6/16 N20.0    Uncontrolled type 2 diabetes mellitus without complication, with long-term current use of insulin NP Carl Sinclair E11.65, Z79.4    TIA (transient ischemic attack) G45.9    Symptomatic carotid artery stenosis I65.29    Type 2 diabetes with nephropathy (HCC) E11.21     Assessment and plan:  1. Diabetes. F/u w ESTHER Sinclair, Dr Raquel Edwards. Long discussion about how the dosing needs to be upped since the change to different regimen  2. Dyslipidemia. Continue current regimen. 3.  Hypertension. Will inc coreg to 25 bid and she is to call in readings thereafter, f/u 4 mos  4. GERD. PPI and avoidance measures  5. Hypovit d. Doing well   6. TIA. Continue current regimen. 7.  Nephrolithiasis. F/U Dr Bisi Bhatia  8. Vascular.   Reiterated the importance of f/u of her pvd, she will be refered to Dr Bethany Rehman group and will dfer imaging to them  9. Leg pain. Referred to Dr Hailey Claire group      RTC 1/20      Above conditions discussed at length and patient vocalized understanding.   All questions answered to patient satisfaction

## 2019-09-19 ENCOUNTER — OFFICE VISIT (OUTPATIENT)
Dept: INTERNAL MEDICINE CLINIC | Age: 69
End: 2019-09-19

## 2019-09-19 ENCOUNTER — TELEPHONE (OUTPATIENT)
Dept: INTERNAL MEDICINE CLINIC | Age: 69
End: 2019-09-19

## 2019-09-19 VITALS
TEMPERATURE: 98.1 F | DIASTOLIC BLOOD PRESSURE: 71 MMHG | SYSTOLIC BLOOD PRESSURE: 160 MMHG | BODY MASS INDEX: 29.41 KG/M2 | HEART RATE: 71 BPM | WEIGHT: 166 LBS | OXYGEN SATURATION: 97 % | HEIGHT: 63 IN | RESPIRATION RATE: 14 BRPM

## 2019-09-19 DIAGNOSIS — E11.21 TYPE 2 DIABETES WITH NEPHROPATHY (HCC): ICD-10-CM

## 2019-09-19 DIAGNOSIS — E55.9 HYPOVITAMINOSIS D: ICD-10-CM

## 2019-09-19 DIAGNOSIS — Z23 ENCOUNTER FOR IMMUNIZATION: ICD-10-CM

## 2019-09-19 DIAGNOSIS — I65.21 SYMPTOMATIC STENOSIS OF RIGHT CAROTID ARTERY: ICD-10-CM

## 2019-09-19 DIAGNOSIS — K21.9 GERD WITHOUT ESOPHAGITIS: ICD-10-CM

## 2019-09-19 DIAGNOSIS — I10 ESSENTIAL HYPERTENSION: ICD-10-CM

## 2019-09-19 DIAGNOSIS — E78.5 DYSLIPIDEMIA: ICD-10-CM

## 2019-09-19 DIAGNOSIS — G45.9 TIA (TRANSIENT ISCHEMIC ATTACK): ICD-10-CM

## 2019-09-19 DIAGNOSIS — Z00.00 MEDICARE ANNUAL WELLNESS VISIT, SUBSEQUENT: Primary | ICD-10-CM

## 2019-09-19 DIAGNOSIS — Z13.31 SCREENING FOR DEPRESSION: ICD-10-CM

## 2019-09-19 DIAGNOSIS — M79.604 PAIN OF RIGHT LOWER EXTREMITY: ICD-10-CM

## 2019-09-19 DIAGNOSIS — Z13.39 SCREENING FOR ALCOHOLISM: ICD-10-CM

## 2019-09-19 DIAGNOSIS — Z71.89 ADVANCED DIRECTIVES, COUNSELING/DISCUSSION: ICD-10-CM

## 2019-09-19 RX ORDER — HUMAN INSULIN 100 [IU]/ML
7 INJECTION, SOLUTION SUBCUTANEOUS
Refills: 0 | COMMUNITY
Start: 2019-06-12

## 2019-09-19 RX ORDER — CARVEDILOL 25 MG/1
25 TABLET ORAL 2 TIMES DAILY WITH MEALS
Qty: 180 TAB | Refills: 3 | Status: SHIPPED | OUTPATIENT
Start: 2019-09-19 | End: 2020-11-23

## 2019-09-19 RX ORDER — PIOGLITAZONEHYDROCHLORIDE 15 MG/1
15 TABLET ORAL DAILY
COMMUNITY
End: 2020-03-26

## 2019-09-19 RX ORDER — HUMAN INSULIN 100 [IU]/ML
30 INJECTION, SUSPENSION SUBCUTANEOUS
Refills: 0 | COMMUNITY
Start: 2019-08-07

## 2019-09-19 NOTE — PROGRESS NOTES
Nguyen Milian presents today for   Chief Complaint   Patient presents with    Annual Wellness Visit    Diabetes     follow up with labs    Leg Pain     right leg pain. sometimes feel numb. comes at different times. pain comes and goes. Pain last along as she stands x several months              Depression Screening:  3 most recent PHQ Screens 9/19/2019   PHQ Not Done -   Little interest or pleasure in doing things Not at all   Feeling down, depressed, irritable, or hopeless Not at all   Total Score PHQ 2 0       Learning Assessment:  Learning Assessment 6/19/2014   PRIMARY LEARNER Patient   HIGHEST LEVEL OF EDUCATION - PRIMARY LEARNER  > 4 YEARS OF COLLEGE   BARRIERS PRIMARY LEARNER NONE   PRIMARY LANGUAGE ENGLISH   LEARNER PREFERENCE PRIMARY VIDEOS   ANSWERED BY patient   RELATIONSHIP SELF       Abuse Screening:  Abuse Screening Questionnaire 9/19/2019   Do you ever feel afraid of your partner? N   Are you in a relationship with someone who physically or mentally threatens you? N   Is it safe for you to go home? Y       Fall Risk  Fall Risk Assessment, last 12 mths 9/19/2019   Able to walk? Yes   Fall in past 12 months? No   Fall with injury? -   Number of falls in past 12 months -   Fall Risk Score -           Coordination of Care:  1. Have you been to the ER, urgent care clinic since your last visit? Hospitalized since your last visit? no    2. Have you seen or consulted any other health care providers outside of the [address] [place] [address] since your last visit? Include any pap smears or colon screening. Madeline Milian is a 71 y.o. female who presents for routine immunizations. Per verbal order from [address] for influenza. Influenza given in left deltoid. She denies any symptoms , reactions or allergies that would exclude them from being immunized today. Risks and adverse reactions were discussed and the VIS was given to them. All questions were addressed.   She was observed for 15 min post injection. There were no reactions observed.     Lisa Meneses LPN

## 2019-09-19 NOTE — TELEPHONE ENCOUNTER
At check out pt wasn't sure when she is to come back. Says rd was changing meds and she didn't know if he needed to see her soon to check on them.

## 2019-09-19 NOTE — ACP (ADVANCE CARE PLANNING)
Advance Directive:  1. Do you have an advance directive in place? Patient Reply:no    2. If not, would you like material regarding how to put one in place?  Patient Reply:YES

## 2019-09-19 NOTE — PATIENT INSTRUCTIONS
Vaccine Information Statement    Influenza (Flu) Vaccine (Inactivated or Recombinant): What You Need to Know    Many Vaccine Information Statements are available in Frisian and other languages. See www.immunize.org/vis  Hojas de información sobre vacunas están disponibles en español y en muchos otros idiomas. Visite www.immunize.org/vis    1. Why get vaccinated? Influenza vaccine can prevent influenza (flu). Flu is a contagious disease that spreads around the [facility] every year, usually between October and May. Anyone can get the flu, but it is more dangerous for some people. Infants and young children, people 72years of age and older, pregnant women, and people with certain health conditions or a weakened immune system are at greatest risk of flu complications. Pneumonia, bronchitis, sinus infections and ear infections are examples of flu-related complications. If you have a medical condition, such as heart disease, cancer or diabetes, flu can make it worse. Flu can cause fever and chills, sore throat, muscle aches, fatigue, cough, headache, and runny or stuffy nose. Some people may have vomiting and diarrhea, though this is more common in children than adults. Each year thousands of people in the [facility] die from flu, and many more are hospitalized. Flu vaccine prevents millions of illnesses and flu-related visits to the doctor each year. 2. Influenza vaccines     CDC recommends everyone 10months of age and older get vaccinated every flu season. Children 6 months through 6years of age may need 2 doses during a single flu season. Everyone else needs only 1 dose each flu season. It takes about 2 weeks for protection to develop after vaccination. There are many flu viruses, and they are always changing. Each year a new flu vaccine is made to protect against three or four viruses that are likely to cause disease in the upcoming flu season.  Even when the vaccine doesnt exactly match these viruses, it may still provide some protection. Influenza vaccine does not cause flu. Influenza vaccine may be given at the same time as other vaccines. 3. Talk with your health care provider    Tell your vaccine provider if the person getting the vaccine:   Has had an allergic reaction after a previous dose of influenza vaccine, or has any severe, life-threatening allergies.  Has ever had Guillain-Barré Syndrome (also called GBS). In some cases, your health care provider may decide to postpone influenza vaccination to a future visit. People with minor illnesses, such as a cold, may be vaccinated. People who are moderately or severely ill should usually wait until they recover before getting influenza vaccine. Your health care provider can give you more information. 4. Risks of a reaction     Soreness, redness, and swelling where shot is given, fever, muscle aches, and headache can happen after influenza vaccine.  There may be a very small increased risk of Guillain-Barré Syndrome (GBS) after inactivated influenza vaccine (the flu shot). Prieto Javier children who get the flu shot along with pneumococcal vaccine (PCV13), and/or DTaP vaccine at the same time might be slightly more likely to have a seizure caused by fever. Tell your health care provider if a child who is getting flu vaccine has ever had a seizure. People sometimes faint after medical procedures, including vaccination. Tell your provider if you feel dizzy or have vision changes or ringing in the ears. As with any medicine, there is a very remote chance of a vaccine causing a severe allergic reaction, other serious injury, or death. 5. What if there is a serious problem? An allergic reaction could occur after the vaccinated person leaves the clinic.  If you see signs of a severe allergic reaction (hives, swelling of the face and throat, difficulty breathing, a fast heartbeat, dizziness, or weakness), call 9-1-1 and get the person to the nearest hospital.    For other signs that concern you, call your health care provider. Adverse reactions should be reported to the Vaccine Adverse Event Reporting System (VAERS). Your health care provider will usually file this report, or you can do it yourself. Visit the VAERS website at www.vaers. [facility].gov or call 9-610.712.1450. VAERS is only for reporting reactions, and VAERS staff do not give medical advice. 6. The National Vaccine Injury Compensation Program    The [facility] Vaccine Injury Compensation Program (VICP) is a federal program that was created to compensate people who may have been injured by certain vaccines. Visit the VICP website at www.[facility].gov/vaccinecompensation or call 9-947.369.6560 to learn about the program and about filing a claim. There is a time limit to file a claim for compensation. 7. How can I learn more?  Ask your health care provider.  Call your local or state health department.  Contact the Centers for Disease Control and Prevention (CDC):  - Call 6-665.412.4917 (7-056-HVV-INFO) or  - Visit CDCs influenza website at www.cdc.gov/flu    Vaccine Information Statement (Interim)  Inactivated Influenza Vaccine   8/15/2019  42 FADI Xiao [id number]   Department of Health and Human Services  Centers for Disease Control and Prevention    Office Use Only      Medicare Wellness Visit, Female     The best way to live healthy is to have a lifestyle where you eat a well-balanced diet, exercise regularly, limit alcohol use, and quit all forms of tobacco/nicotine, if applicable. Regular preventive services are another way to keep healthy. Preventive services (vaccines, screening tests, monitoring & exams) can help personalize your care plan, which helps you manage your own care. Screening tests can find health problems at the earliest stages, when they are easiest to treat.    [facility] follows the current, evidence-based guidelines published by the [employer] (USPSTF) when recommending preventive services for our patients. Because we follow these guidelines, sometimes recommendations change over time as research supports it. (For example, mammograms used to be recommended annually. Even though Medicare will still pay for an annual mammogram, the newer guidelines recommend a mammogram every two years for women of average risk.)  Of course, you and your doctor may decide to screen more often for some diseases, based on your risk and your health status. Preventive services for you include:  - Medicare offers their members a free annual wellness visit, which is time for you and your primary care provider to discuss and plan for your preventive service needs. Take advantage of this benefit every year!  -All adults over the age of 72 should receive the recommended pneumonia vaccines. Current USPSTF guidelines recommend a series of two vaccines for the best pneumonia protection.   -All adults should have a flu vaccine yearly and a tetanus vaccine every 10 years. All adults age 61 and older should receive a shingles vaccine once in their lifetime.    -A bone mass density test is recommended when a woman turns 65 to screen for osteoporosis. This test is only recommended one time, as a screening. Some providers will use this same test as a disease monitoring tool if you already have osteoporosis.   -All adults age 38-68 who are overweight should have a diabetes screening test once every three years.   -Other screening tests and preventive services for persons with diabetes include: an eye exam to screen for diabetic retinopathy, a kidney function test, a foot exam, and stricter control over your cholesterol.   -Cardiovascular screening for adults with routine risk involves an electrocardiogram (ECG) at intervals determined by your doctor.   -Colorectal cancer screenings should be done for adults age 54-65 with no increased risk factors for colorectal cancer. There are a number of acceptable methods of screening for this type of cancer. Each test has its own benefits and drawbacks. Discuss with your doctor what is most appropriate for you during your annual wellness visit. The different tests include: colonoscopy (considered the best screening method), a fecal occult blood test, a fecal DNA test, and sigmoidoscopy. -Breast cancer screenings are recommended every other year for women of normal risk, age 54-69.  -Cervical cancer screenings for women over age 72 are only recommended with certain risk factors.   -All adults born between [facility] should be screened once for Hepatitis C.      Here is a list of your current Health Maintenance items (your personalized list of preventive services) with a due date:  Health Maintenance Due   Topic Date Due    Shingles Vaccine (1 of 2) 08/29/2000    Diabetic Foot Care  06/27/2018

## 2019-09-20 NOTE — ACP (ADVANCE CARE PLANNING)
Advance Care Planning    Advance Care Planning (ACP) Provider Note - Comprehensive     Date of ACP Conversation: 09/19/19  Persons included in Conversation:  patient  Length of ACP Conversation in minutes:  16 minutes    Authorized Decision Maker (if patient is incapable of making informed decisions): This person is: nelson Davis  Other Legally Authorized Decision Maker (e.g. Next of Kin)          General ACP for ALL Patients with Decision Making Capacity:   Importance of advance care planning, including choosing a healthcare agent to communicate patient's healthcare decisions if patient lost the ability to make decisions, such as after a sudden illness or accident  Understanding of the healthcare agent role was assessed and information provided  Exploration of values, goals, and preferences if recovery is not expected, even with continued medical treatment in the event of: Imminent death  Severe, permanent brain injury    Review of Existing Advance Directive:  She will work on amd    For Serious or Chronic Illness:  Understanding of medical condition    Understanding of CPR, goals and expected outcomes, benefits and burdens discussed.     Interventions Provided:  Recommended completion of Advance Directive form after review of ACP materials and conversation with prospective healthcare agent   Recommended communicating the plan and making copies for the healthcare agent, personal physician, and others as appropriate (e.g., health system)  Recommended review of completed ACP document annually or upon change in health status

## 2019-10-07 ENCOUNTER — OFFICE VISIT (OUTPATIENT)
Dept: ORTHOPEDIC SURGERY | Facility: CLINIC | Age: 69
End: 2019-10-07

## 2019-10-07 VITALS
SYSTOLIC BLOOD PRESSURE: 145 MMHG | BODY MASS INDEX: 29.88 KG/M2 | OXYGEN SATURATION: 100 % | DIASTOLIC BLOOD PRESSURE: 71 MMHG | HEART RATE: 64 BPM | HEIGHT: 63 IN | WEIGHT: 168.6 LBS | RESPIRATION RATE: 18 BRPM | TEMPERATURE: 97.1 F

## 2019-10-07 DIAGNOSIS — R60.9 PERIPHERAL EDEMA: ICD-10-CM

## 2019-10-07 DIAGNOSIS — M79.605 LEFT LEG PAIN: ICD-10-CM

## 2019-10-07 DIAGNOSIS — M54.32 SCIATICA OF LEFT SIDE: Primary | ICD-10-CM

## 2019-10-07 RX ORDER — DICLOFENAC SODIUM 10 MG/G
4 GEL TOPICAL 4 TIMES DAILY
Qty: 5 EACH | Refills: 5 | Status: SHIPPED | OUTPATIENT
Start: 2019-10-07

## 2019-10-07 NOTE — PROGRESS NOTES
Patient: Jakob Pulido                MRN: [id number]       SSN: [id number]  YOB: 1950        AGE: 71 y.o. SEX: female    PCP: Melida Sanchez MD  10/07/19    Chief Complaint   Patient presents with    Leg Pain     Left     HISTORY:  Jakob Pulido is a 71 y.o. female who is seen for left leg pain. She feels numbness, aching, and throbbing in her left leg. She feels pain radiating down her left leg to her foot. Her pain is intermittent. She feels more pain when standing and walking. She feels like she is dragging her left foot when she walks. She denies any back pain. She has a h/o mini strokes. She had one larger stroke in 2017 that resulted in left side weakness and speech difficulty. 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 She lives in [place] with her . She has 1 adult daughter and 6 and 2 yo grandsons in [place]. She enjoys reading and shopping. She transports her grandchildren to school. She is involved in the professional [employer]. Ms. Dianelys Ta weighs 168 lbs and is 5'3\" tall.        Lab Results   Component Value Date/Time    Hemoglobin A1c 10.8 (H) 09/12/2019 09:06 AM    Hemoglobin A1c, External 8.1 04/25/2018     Weight Metrics 10/7/2019 9/19/2019 4/17/2019 3/12/2019 7/16/2018 7/5/2018 5/8/2018   Weight 168 lb 9.6 oz 166 lb 164 lb 164 lb 160 lb 164 lb 162 lb   BMI 29.87 kg/m2 29.41 kg/m2 29.05 kg/m2 29.05 kg/m2 28.34 kg/m2 29.05 kg/m2 28.7 kg/m2       Patient Active Problem List   Diagnosis Code    Hypovitaminosis D E55.9    Dyslipidemia E78.5    Essential hypertension I10    GERD without esophagitis K21.9    Advance directive discussed with patient Z70.80    Nephrolithiasis s/p EWSL Dr Modesto Elizabeth 6/16 N20.0    Uncontrolled type 2 diabetes mellitus without complication, with long-term current use of insulin NP Pola Head E11.65, Z79.4    TIA (transient ischemic attack) G45.9    Symptomatic carotid artery stenosis I65.29    Type 2 diabetes with nephropathy (HCC) E11.21     REVIEW OF SYSTEMS: All Below are Negative except: See HPI   Constitutional: negative for fever, chills, and weight loss. Cardiovascular: negative for chest pain, claudication, leg swelling, SOB, HOWELL   Gastrointestinal: Negative for pain, N/V/C/D, Blood in stool or urine, dysuria, hematuria, incontinence, pelvic pain. Musculoskeletal: See HPI   Neurological: Negative for dizziness and weakness. Negative for headaches, Visual changes, confusion, seizures   Phychiatric/Behavioral: Negative for depression, memory loss, substance abuse. Extremities: Negative for hair changes, rash, or skin lesion changes. Hematologic: Negative for bleeding problems, bruising, pallor or swollen lymph nodes   Peripheral Vascular: No calf pain, no circulation deficits. Social History     Socioeconomic History    Marital status:      Spouse name: Not on file    Number of children: Not on file    Years of education: Not on file    Highest education level: Not on file   Occupational History    Occupation: retired teacher [employer]   Social Needs    Financial resource strain: Not on file    Food insecurity:     Worry: Not on file     Inability: Not on file   [email or web address] needs:     Medical: Not on file     Non-medical: Not on file   Tobacco Use    Smoking status: Never Smoker    Smokeless tobacco: Never Used   Substance and Sexual Activity    Alcohol use:  Yes     Alcohol/week: 2.0 standard drinks     Types: 1 Glasses of wine, 1 Cans of beer per week     Comment: social glass once a year    Drug use: No    Sexual activity: Not on file   Lifestyle    Physical activity:     Days per week: Not on file     Minutes per session: Not on file    Stress: Not on file   Relationships    Social connections:     Talks on phone: Not on file     Gets together: Not on file     Attends Latter day service: Not on file     Active member of club or organization: Not on file     Attends meetings of clubs or organizations: Not on file     Relationship status: Not on file    Intimate partner violence:     Fear of current or ex partner: Not on file     Emotionally abused: Not on file     Physically abused: Not on file     Forced sexual activity: Not on file   Other Topics Concern    Not on file   Social History Narrative    Not on file      Allergies   Allergen Reactions    Lopid [Gemfibrozil] Other (comments)     elev lfts    Penicillins Shortness of Breath and Nausea and Vomiting      Current Outpatient Medications   Medication Sig    NOVOLIN R REGULAR U-100 INSULN 100 unit/mL injection 7 Units by SubCUTAneous route.  NOVOLIN N NPH U-100 INSULIN 100 unit/mL injection inject 20 units subcutaneously every morning    pioglitazone (ACTOS) 15 mg tablet Take 15 mg by mouth daily.  carvedilol (COREG) 25 mg tablet Take 1 Tab by mouth two (2) times daily (with meals).  clopidogrel (PLAVIX) 75 mg tab take 1 tablet by mouth once daily    atorvastatin (LIPITOR) 40 mg tablet Take 1 Tab by mouth daily.  metFORMIN (GLUCOPHAGE) 1,000 mg tablet Take 1,000 mg by mouth two (2) times daily (with meals).  amLODIPine (NORVASC) 10 mg tablet Take  by mouth daily.  olmesartan-hydrochlorothiazide (BENICAR HCT) 40-25 mg per tablet Take 1 Tab by mouth daily.  Cholecalciferol, Vitamin D3, (VITAMIN D) 5,000 unit Tab Take 2,000 Units by mouth. Takes 12 units am and 14 units pm    folic acid/multivit-min/lutein (CENTRUM SILVER PO) Take  by mouth. No current facility-administered medications for this visit.        PHYSICAL EXAMINATION:  Visit Vitals  /71   Pulse 64   Temp 97.1 °F (36.2 °C) (Oral)   Resp 18   Ht 5' 3\" (1.6 m)   Wt 168 lb 9.6 oz (76.5 kg)   SpO2 100%   BMI 29.87 kg/m²      ORTHO EXAMINATION:  Examination Right knee Left knee   Skin Intact Intact   Range of motion 120-0 115-0   Effusion - -   Medial joint line tenderness - +   Lateral joint line tenderness - -   Popliteal tenderness - -   Osteophytes palpable - -   Johannas - -   Patella crepitus - -   Anterior drawer - -   Lateral laxity - -   Medial laxity - -   Varus deformity - -   Valgus deformity - -   Pretibial edema - +   Calf tenderness - -       RADIOGRAPHS:  XR LEFT TIB/FIB 10/7/19 ARNOLDO  IMPRESSION:  Two views - No fracture. IMPRESSION:      ICD-10-CM ICD-9-CM    1. Sciatica of left side M54.32 724.3    2. Left leg pain M79.605 729.5 AMB POC XRAY; TIBIA & FIBULA, TWO VIE   3. Peripheral edema R60.9 782.3 AMB SUPPLY ORDER     PLAN:  Compression stockings and Voltaren gel Rx provided. There is no need for surgery at this time. She will follow up at the spine center.       Scribed by Hilnataly Phlegm ([address]) as dictated by Juan Frederick MD

## 2019-10-11 ENCOUNTER — TELEPHONE (OUTPATIENT)
Dept: ORTHOPEDIC SURGERY | Facility: CLINIC | Age: 69
End: 2019-10-11

## 2019-10-11 NOTE — TELEPHONE ENCOUNTER
PA is required for Diclofenac gel    Cover [facility] [employer] Erna Hayes George: [id number] - PA Case ID: [id number] - Rx #: [id number]

## 2019-10-14 NOTE — PROGRESS NOTES
Zoraida Gonzalez    Chief Complaint   Patient presents with    New Patient    Carotid Artery Stenosis       History and Physical    Ms Sunita Raymundo is referred by PCP after they recently saw her in follow up after not having been in over a year    She was admitted to [facility] early 2018 after symptoms described and documented as TIA  It was noted by PCP she underwent angiogram by Dr Joseph Booth and he was recommending PAIGE procedure but then he retired and pt never f/u with the physician that took over the practice Dr Tamara Mendoza. She has not had f/u imaging since. No new neurologic complaints    What I can gather is an MRA early 2018: 1. High-grade stenosis/occlusion of the proximal right vertebral artery with  segmental reconstitution of short segments of the more distal right vertebral  artery. Left vertebral artery patent. 2. Right internal carotid artery 50-6% segmental stenosis proximally. 3. Less than 10% stenosis left carotid artery bifurcation. It does appear an angio was done prior to consideration of a carotid endarterectomy.  It revealed only a 60% stenosis so the CEA was not advised and instead agreed on best medical therapy including DAPT - however, there was then a cardiology evaluation for clearance for the possible CEA and they had suggested just monotherapy so she has actually only been taking plavix, no aspirin    She has had no other major neuro events  She still gets some generalized intermittent weakness in her extremities but is being worked up by ortho/spine for spinal disease    Past Medical History:   Diagnosis Date    Calculus of kidney 6/16    Dr Amol Dunlap; right ureteral stone s/p EWSL    Diabetes ([facility] [address]     Dr Mary Morocho GERD (gastroesophageal reflux disease)     Hyperlipidemia     Hypertension     Hypovitaminosis D     TIA (transient ischemic attack) 04/2018     Patient Active Problem List   Diagnosis Code    Hypovitaminosis D E55.9    Dyslipidemia E78.5    Essential hypertension I10    GERD without esophagitis K21.9    Advance directive discussed with patient Z70.80    Nephrolithiasis s/p EWSL Dr Salena Seip 6/16 N20.0    Uncontrolled type 2 diabetes mellitus without complication, with long-term current use of insulin NP Maggie Martin E11.65, Z79.4    TIA (transient ischemic attack) G45.9    Symptomatic carotid artery stenosis I65.29    Type 2 diabetes with nephropathy ([address] E11.21     Past Surgical History:   Procedure Laterality Date    CARDIAC SURG PROCEDURE UNLIST  01/2018    holter few pac/pvc, 4 beat run svt rate 156    CARDIAC SURG PROCEDURE UNLIST  08/2018    Dr Womack Neymar; NST neg, ef 76%    ECHO 2D ADULT  11/2017    conc lvh, ef 60%, DD (3/12); nl lv, ef 68%, conc lvh, no wma, mild dd, no valvular abn, neg bubble study (11/17 - [address])    HX BREAST BIOPSY  2009    negative    HX COLONOSCOPY  12/13    negative Dr Hussein March t score 1.10 spine, 1.10 hip (11/13)    HX UROLOGICAL Right 06/10/2016    R ESWL-Dr. Masoud Carson- CRSCVB    NEUROLOGICAL PROCEDURE UNLISTED  11/2017    mri showed mod periventricular wm changes    STRESS TEST THALLIUM STUDY  3/12    negative w EF 70%    VASCULAR SURGERY PROCEDURE UNLIST  11/2017    carotids showed bilat <50% ICA, right vertebral art obst    VASCULAR SURGERY PROCEDURE UNLIST  12/2017    CTA head/neck showed no aneurysm, <50% right intracranial ICA, diminutive right vertebral art    VASCULAR SURGERY PROCEDURE UNLIST  04/2018    JUAN R 1.0, L 1.1    VASCULAR SURGERY PROCEDURE UNLIST  04/2018    MRA right vert artery not visualized due to occlusion/stenosis, left patent, prox PAIGE 50-60% stenosis     Current Outpatient Medications   Medication Sig Dispense Refill    diclofenac (VOLTAREN) 1 % gel Apply 4 g to affected area four (4) times daily.  Left knee and left leg 5 Each 5    NOVOLIN R REGULAR U-100 INSULN 100 unit/mL injection 7 Units by SubCUTAneous route.  0    NOVOLIN N NPH U-100 INSULIN 100 unit/mL injection inject 20 units subcutaneously every morning  0    pioglitazone (ACTOS) 15 mg tablet Take 15 mg by mouth daily.  carvedilol (COREG) 25 mg tablet Take 1 Tab by mouth two (2) times daily (with meals). 180 Tab 3    clopidogrel (PLAVIX) 75 mg tab take 1 tablet by mouth once daily 90 Tab 3    folic acid/multivit-min/lutein (CENTRUM SILVER PO) Take  by mouth.  atorvastatin (LIPITOR) 40 mg tablet Take 1 Tab by mouth daily. 90 Tab 3    metFORMIN (GLUCOPHAGE) 1,000 mg tablet Take 1,000 mg by mouth two (2) times daily (with meals).  amLODIPine (NORVASC) 10 mg tablet Take  by mouth daily.  olmesartan-hydrochlorothiazide (BENICAR HCT) 40-25 mg per tablet Take 1 Tab by mouth daily.  Cholecalciferol, Vitamin D3, (VITAMIN D) 5,000 unit Tab Take 2,000 Units by mouth. Takes 12 units am and 14 units pm       Allergies   Allergen Reactions    Lopid [Gemfibrozil] Other (comments)     elev lfts    Penicillins Shortness of Breath and Nausea and Vomiting     Social History     Socioeconomic History    Marital status:      Spouse name: Not on file    Number of children: Not on file    Years of education: Not on file    Highest education level: Not on file   Occupational History    Occupation: retired teacher [employer]   Social Needs    Financial resource strain: Not on file    Food insecurity:     Worry: Not on file     Inability: Not on file   [employer] needs:     Medical: Not on file     Non-medical: Not on file   Tobacco Use    Smoking status: Never Smoker    Smokeless tobacco: Never Used   Substance and Sexual Activity    Alcohol use:  Yes     Alcohol/week: 2.0 standard drinks     Types: 1 Glasses of wine, 1 Cans of beer per week     Comment: social glass once a year    Drug use: No    Sexual activity: Not on file   Lifestyle    Physical activity:     Days per week: Not on file     Minutes per session: Not on file    Stress: Not on file   Relationships  Social connections:     Talks on phone: Not on file     Gets together: Not on file     Attends Catholic service: Not on file     Active member of club or organization: Not on file     Attends meetings of clubs or organizations: Not on file     Relationship status: Not on file    Intimate partner violence:     Fear of current or ex partner: Not on file     Emotionally abused: Not on file     Physically abused: Not on file     Forced sexual activity: Not on file   Other Topics Concern    Not on file   Social History Narrative    Not on file      Family History   Problem Relation Age of Onset    Diabetes Father     Hypertension Father     Cancer Sister     Hypertension Sister        Review of Systems    Review of Systems - History obtained from the patient  General ROS: negative  Psychological ROS: negative  Respiratory ROS: negative  Cardiovascular ROS: negative  Gastrointestinal ROS: negative  Musculoskeletal ROS: per HPI  Neurological ROS: no TIA or stroke symptoms  Dermatological ROS: negative  Vascular ROS: negative        Physical Exam:    Visit Vitals  /80 (BP 1 Location: Left arm, BP Patient Position: Sitting)   Resp 16   Ht 5' 3\" (1.6 m)   Wt 168 lb (76.2 kg)   BMI 29.76 kg/m²      General:  Alert, cooperative, no distress. Head:  Normocephalic, without obvious abnormality, atraumatic. Eyes:    Conjunctivae/corneas clear. Pupils equal, round, reactive to light. Extraocular movements intact. Neck:         No bruits   Lungs:   Clear to auscultation bilaterally. Heart:  Regular rate and rhythm, S1, S2 normal   Extremities: Extremities normal, atraumatic, no cyanosis or edema. Pulses: 2+ and symmetric all extremities. Skin: Skin color, texture, turgor normal. No rashes or lesions. Neurologic: No obvious neuro deficits     Impression and Plan:  1.  Cerebrovascular accident (CVA) due to thrombosis of vertebral artery, unspecified blood vessel laterality (HCC)    2. Stenosis of carotid artery, unspecified laterality    3. Essential hypertension    4. Dyslipidemia    5. TIA (transient ischemic attack)      Orders Placed This Encounter    CTA NECK     For comparison purposes of the carotid angio,will suggest follow up imaging initially to be a CTA   If remains at 60% can transition to duplex for surveillance purposes  If findings are in severe range then we would end up discussing intervention  She acknowledges an understanding  She appreciates the summary of what I could find and review regarding her history and plan for follow up evaluation      ANJUM Jacobson    Portions of this note have been created using voice recognition software.

## 2019-10-16 ENCOUNTER — OFFICE VISIT (OUTPATIENT)
Dept: VASCULAR SURGERY | Age: 69
End: 2019-10-16

## 2019-10-16 VITALS
BODY MASS INDEX: 29.77 KG/M2 | SYSTOLIC BLOOD PRESSURE: 130 MMHG | RESPIRATION RATE: 16 BRPM | HEIGHT: 63 IN | DIASTOLIC BLOOD PRESSURE: 80 MMHG | WEIGHT: 168 LBS

## 2019-10-16 DIAGNOSIS — I10 ESSENTIAL HYPERTENSION: ICD-10-CM

## 2019-10-16 DIAGNOSIS — G45.9 TIA (TRANSIENT ISCHEMIC ATTACK): ICD-10-CM

## 2019-10-16 DIAGNOSIS — I65.29 STENOSIS OF CAROTID ARTERY, UNSPECIFIED LATERALITY: ICD-10-CM

## 2019-10-16 DIAGNOSIS — I63.019 CEREBROVASCULAR ACCIDENT (CVA) DUE TO THROMBOSIS OF VERTEBRAL ARTERY, UNSPECIFIED BLOOD VESSEL LATERALITY (HCC): Primary | ICD-10-CM

## 2019-10-16 DIAGNOSIS — E78.5 DYSLIPIDEMIA: ICD-10-CM

## 2019-10-16 NOTE — PROGRESS NOTES
1. Have you been to an emergency room or urgent care clinic since your last visit? NO    Hospitalized since your last visit? If yes, where, when, and reason for visit? NO  2. Have you seen or consulted any other health care providers outside of the [facility] since your last visit including any procedures, health maintenance items. If yes, where, when and reason for visit?  NO

## 2019-10-24 ENCOUNTER — HOSPITAL ENCOUNTER (OUTPATIENT)
Dept: CT IMAGING | Age: 69
Discharge: HOME | End: 2019-10-24
Attending: PHYSICIAN ASSISTANT
Payer: MEDICARE

## 2019-10-24 DIAGNOSIS — I65.29 STENOSIS OF CAROTID ARTERY, UNSPECIFIED LATERALITY: ICD-10-CM

## 2019-10-24 DIAGNOSIS — I63.019 CEREBROVASCULAR ACCIDENT (CVA) DUE TO THROMBOSIS OF VERTEBRAL ARTERY, UNSPECIFIED BLOOD VESSEL LATERALITY (HCC): ICD-10-CM

## 2019-10-24 LAB — CREAT UR-MCNC: 0.9 MG/DL (ref 0.6–1.3)

## 2019-10-24 PROCEDURE — 70498 CT ANGIOGRAPHY NECK: CPT

## 2019-10-24 PROCEDURE — 74011636320 HC RX REV CODE- 636/320: Performed by: PHYSICIAN ASSISTANT

## 2019-10-24 PROCEDURE — 82565 ASSAY OF CREATININE: CPT

## 2019-10-24 RX ADMIN — IOPAMIDOL 75 ML: 755 INJECTION, SOLUTION INTRAVENOUS at 14:10

## 2019-11-15 ENCOUNTER — OFFICE VISIT (OUTPATIENT)
Dept: VASCULAR SURGERY | Age: 69
End: 2019-11-15

## 2019-11-15 VITALS
DIASTOLIC BLOOD PRESSURE: 76 MMHG | HEIGHT: 63 IN | BODY MASS INDEX: 29.77 KG/M2 | WEIGHT: 168 LBS | RESPIRATION RATE: 18 BRPM | SYSTOLIC BLOOD PRESSURE: 124 MMHG

## 2019-11-15 DIAGNOSIS — G45.9 TIA (TRANSIENT ISCHEMIC ATTACK): Primary | ICD-10-CM

## 2019-11-15 DIAGNOSIS — M79.605 PAIN OF LEFT LOWER EXTREMITY: ICD-10-CM

## 2019-11-15 NOTE — PROGRESS NOTES
[address] [facility]    No chief complaint on file. History and Physical    70-year-old female with history of TIA in 2018 placed on best medical therapy. She is here in follow-up regarding carotid stenosis. She is had a CAT scan for today's visit. She is treated for hypertension hyperlipidemia diabetes and history of TIA. She takes Plavix and Lipitor. She also mentions her left calf becomes tired after long day of walking.     Past Medical History:   Diagnosis Date    Calculus of kidney 6/16    Dr Harjit Davis; right ureteral stone s/p EWSL    Diabetes ([facility] [address]     Dr Rhonda Foote GERD (gastroesophageal reflux disease)     Hyperlipidemia     Hypertension     Hypovitaminosis D     TIA (transient ischemic attack) 04/2018     Patient Active Problem List   Diagnosis Code    Hypovitaminosis D E55.9    Dyslipidemia E78.5    Essential hypertension I10    GERD without esophagitis K21.9    Advance directive discussed with patient Z70.80    Nephrolithiasis s/p EWSL Dr Harjit Davis 6/16 N20.0    Uncontrolled type 2 diabetes mellitus without complication, with long-term current use of insulin NP Destiney Abo E11.65, Z79.4    TIA (transient ischemic attack) G45.9    Symptomatic carotid artery stenosis I65.29    Type 2 diabetes with nephropathy ([facility] [address] E11.21     Past Surgical History:   Procedure Laterality Date    CARDIAC SURG PROCEDURE UNLIST  01/2018    holter few pac/pvc, 4 beat run svt rate 156    CARDIAC SURG PROCEDURE UNLIST  08/2018    Dr Fly Garrett; NST neg, ef 76%    ECHO 2D ADULT  11/2017    conc lvh, ef 60%, DD (3/12); nl lv, ef 68%, conc lvh, no wma, mild dd, no valvular abn, neg bubble study (11/17 - [address])    HX BREAST BIOPSY  2009    negative    HX COLONOSCOPY  12/13    negative Dr Webster Sings t score 1.10 spine, 1.10 hip (11/13)    HX UROLOGICAL Right 06/10/2016    R ESWL-Dr. Garret Cotter- CRSCVB    NEUROLOGICAL PROCEDURE UNLISTED  11/2017    mri showed mod periventricular wm changes    STRESS TEST THALLIUM STUDY  3/12    negative w EF 70%    VASCULAR SURGERY PROCEDURE UNLIST  11/2017    carotids showed bilat <50% ICA, right vertebral art obst    VASCULAR SURGERY PROCEDURE UNLIST  12/2017    CTA head/neck showed no aneurysm, <50% right intracranial ICA, diminutive right vertebral art    VASCULAR SURGERY PROCEDURE UNLIST  04/2018    JUAN R 1.0, L 1.1    VASCULAR SURGERY PROCEDURE UNLIST  04/2018    MRA right vert artery not visualized due to occlusion/stenosis, left patent, prox PAIGE 50-60% stenosis     Current Outpatient Medications   Medication Sig Dispense Refill    diclofenac (VOLTAREN) 1 % gel Apply 4 g to affected area four (4) times daily. Left knee and left leg 5 Each 5    NOVOLIN R REGULAR U-100 INSULN 100 unit/mL injection 7 Units by SubCUTAneous route.  0    NOVOLIN N NPH U-100 INSULIN 100 unit/mL injection inject 20 units subcutaneously every morning  0    pioglitazone (ACTOS) 15 mg tablet Take 15 mg by mouth daily.  carvedilol (COREG) 25 mg tablet Take 1 Tab by mouth two (2) times daily (with meals). 180 Tab 3    clopidogrel (PLAVIX) 75 mg tab take 1 tablet by mouth once daily 90 Tab 3    folic acid/multivit-min/lutein (CENTRUM SILVER PO) Take  by mouth.  atorvastatin (LIPITOR) 40 mg tablet Take 1 Tab by mouth daily. 90 Tab 3    metFORMIN (GLUCOPHAGE) 1,000 mg tablet Take 1,000 mg by mouth two (2) times daily (with meals).  amLODIPine (NORVASC) 10 mg tablet Take  by mouth daily.  olmesartan-hydrochlorothiazide (BENICAR HCT) 40-25 mg per tablet Take 1 Tab by mouth daily.  Cholecalciferol, Vitamin D3, (VITAMIN D) 5,000 unit Tab Take 2,000 Units by mouth.  Takes 12 units am and 14 units pm       Allergies   Allergen Reactions    Lopid [Gemfibrozil] Other (comments)     elev lfts    Penicillins Shortness of Breath and Nausea and Vomiting       Review of Systems    Prior TIA described as speech disturbance, positive for diabetes, non-smoker no shortness of breath, no chest pain she is treated for hypertension, no constipation or diarrhea, ambulatory with minimal calf pain with ambulation. Denies anxiety or depression, no history of dialysis, no ongoing bleeding or clotting disorders. Physical   Visit Vitals  /76 (BP 1 Location: Left arm, BP Patient Position: Sitting)   Resp 18   Ht 5' 3\" (1.6 m)   Wt 168 lb (76.2 kg)   BMI 29.76 kg/m²       Looks well today no deficit  No facial symmetry speech is intact  Neck no JVD she does have bruits  Chest is clear  Cardiac is regular murmur noted  Abdomen soft flat nontender  Lower extremities she has palpable pulses no signs of arterial insufficiency  No edema today  Range of motion strength are symmetrical  I reviewed her CAT scan she is a 60% focal narrowing at the origin of the right internal carotid artery and a 40% focal narrowing at the origin of the left internal carotid artery. Impression/Plan:     ICD-10-CM ICD-9-CM    1. TIA (transient ischemic attack) G45.9 435.9 DUPLEX CAROTID BILATERAL   2. Pain of left lower extremity M79.605 729.5 LOWER EXT ART PVR MULT LEVEL SEG PRESSURES     Bilateral internal carotid artery stenosis moderate in nature and asymptomatic at this point on best medical therapy. Like her to continue her antiplatelet and statin. We will see her for a Doppler in 6 months. I will also get a lower extremity PVL regarding her claudication-like symptoms she describes to me on the left leg. I do not see any acute arterial deficiency today. No orders of the defined types were placed in this encounter. Follow-up and Dispositions    · Return in about 6 months (around 5/15/2020). Yanna Angulo MD    PLEASE NOTE:  This document has been produced using voice recognition software. Unrecognized errors in transcription may be present.

## 2019-12-23 ENCOUNTER — OFFICE VISIT (OUTPATIENT)
Dept: INTERNAL MEDICINE CLINIC | Age: 69
End: 2019-12-23

## 2019-12-23 VITALS
RESPIRATION RATE: 14 BRPM | BODY MASS INDEX: 30.3 KG/M2 | WEIGHT: 171 LBS | OXYGEN SATURATION: 97 % | SYSTOLIC BLOOD PRESSURE: 144 MMHG | DIASTOLIC BLOOD PRESSURE: 70 MMHG | HEART RATE: 72 BPM | TEMPERATURE: 98.1 F | HEIGHT: 63 IN

## 2019-12-23 DIAGNOSIS — I10 ESSENTIAL HYPERTENSION: ICD-10-CM

## 2019-12-23 DIAGNOSIS — G45.9 TIA (TRANSIENT ISCHEMIC ATTACK): ICD-10-CM

## 2019-12-23 DIAGNOSIS — E78.5 DYSLIPIDEMIA: Primary | ICD-10-CM

## 2019-12-23 DIAGNOSIS — I65.21 SYMPTOMATIC STENOSIS OF RIGHT CAROTID ARTERY: ICD-10-CM

## 2019-12-23 DIAGNOSIS — E11.21 TYPE 2 DIABETES WITH NEPHROPATHY (HCC): ICD-10-CM

## 2019-12-23 NOTE — PROGRESS NOTES
Shonna Mcclure presents today for   Chief Complaint   Patient presents with    Hypertension     3 month follow up with labs    Diarrhea     off and on in the last 2 months. pt is not sure if its link to a med. no change in eating habits              Depression Screening:  3 most recent PHQ Screens 11/15/2019   PHQ Not Done -   Little interest or pleasure in doing things Not at all   Feeling down, depressed, irritable, or hopeless Not at all   Total Score PHQ 2 0       Learning Assessment:  Learning Assessment 10/7/2019   PRIMARY LEARNER Patient   HIGHEST LEVEL OF EDUCATION - PRIMARY LEARNER  -   BARRIERS PRIMARY LEARNER -   PRIMARY LANGUAGE ENGLISH   LEARNER PREFERENCE PRIMARY VIDEOS   ANSWERED BY Patient   RELATIONSHIP SELF       Abuse Screening:  Abuse Screening Questionnaire 9/19/2019   Do you ever feel afraid of your partner? N   Are you in a relationship with someone who physically or mentally threatens you? N   Is it safe for you to go home? Y       Fall Risk  Fall Risk Assessment, last 12 mths 11/15/2019   Able to walk? Yes   Fall in past 12 months? No   Fall with injury? -   Number of falls in past 12 months -   Fall Risk Score -       Health Maintenance Due   Topic Date Due    Shingrix Vaccine Age 50> (1 of 2) 08/29/2000    FOOT EXAM Q1  06/27/2018         Coordination of Care:  1. Have you been to the ER, urgent care clinic since your last visit? Hospitalized since your last visit? no    2. Have you seen or consulted any other health care providers outside of the [address] [place] [address] since your last visit? Include any pap smears or colon screening.  no

## 2019-12-23 NOTE — PROGRESS NOTES
Discussion about weight loss    Body mass index is 30.29 kg/m². Discussion about modifying behaviors regarding diet and exercise undertaken    Increase activity as tolerated   - admits she is trying to get this started    Cut back carbs and fatty foods.     Calorie counting would be ideal   - admits she;s eating too much butter and carbs    Option of appetite suppressants discussed briefly and declined   - due to concerns about sfx and cost    Discussed sending to nutritionist for further teaching declined    Intermittent fasting to be considered in future    We set initial target of up to 5% wt loss as being realistic over the next 6-12 months and possibly aiming for higher than that in the future     Will come back for reevaluation and further management at subsequent visits which will be determined by patient response    Total time 15 min

## 2019-12-23 NOTE — PROGRESS NOTES
71 y.o. BLACK OR  female who presents for evaluation. She continues to see Keri Avina. Denies polyuria, polydipsia, nocturia, vision change. She has been referred to Dr Brigid Michael for opinion regarding the management plan apparently    Reports no cardiovascular complaints. We inc the coreg to 25 bid and her bp has been running in the 130s over 70s when she checks    She saw Dr Víctor Elizondo and they did CTA and are just doing surveillance imaging and no surgery planned as yet on therapy as below    No GI or Gu complaints. She saw Dr Suze Valentin regarding the calf pain and they felt it was more sciatic.   However, vascular has ordered arterial duplex also    She admits the diet is off    LAST MEDICARE WELLNESS EXAM: 12/29/15, 6/27/17, 7/16/18, 9/19/19    Past Medical History:   Diagnosis Date    Calculus of kidney 6/16    Dr Ciro Lyn; right ureteral stone s/p EWSL    Diabetes ([address]     Dr Carlos Spivey GERD (gastroesophageal reflux disease)     Hyperlipidemia     Hypertension     Hypovitaminosis D     Obesity     W - 12/19    TIA (transient ischemic attack) 04/2018     Past Surgical History:   Procedure Laterality Date    CARDIAC SURG PROCEDURE UNLIST  01/2018    holter few pac/pvc, 4 beat run svt rate [place]  08/2018    Dr Hoffmann Flatten; NST neg, ef 76%    ECHO 2D ADULT  11/2017    conc lvh, ef 60%, DD (3/12); nl lv, ef 68%, conc lvh, no wma, mild dd, no valvular abn, neg bubble study (11/17 - [id number])    HX BREAST BIOPSY  2009    negative    HX COLONOSCOPY  12/13    negative Dr Hugo Guido t score 1.10 spine, 1.10 hip (11/13)    HX UROLOGICAL Right 06/10/2016    R [facility]-Dr. Doretha Marshall- [facility]    NEUROLOGICAL PROCEDURE UNLISTED  11/2017    mri showed mod periventricular wm changes    STRESS TEST THALLIUM STUDY  3/12    negative w EF 70%    VASCULAR SURGERY PROCEDURE UNLIST  11/2017    carotids showed bilat <50% ICA, right vertebral art obst  VASCULAR SURGERY PROCEDURE UNLIST  12/2017    CTA head/neck showed no aneurysm, <50% right intracranial ICA, diminutive right vertebral art    VASCULAR SURGERY PROCEDURE UNLIST  04/2018    JUAN R 1.0, L 1.1    VASCULAR SURGERY PROCEDURE UNLIST  04/2018    MRA right vert artery not visualized due to occlusion/stenosis, left patent, prox PAIGE 50-60% stenosis    VASCULAR SURGERY PROCEDURE UNLIST  10/2019    CTA showed 08% PAIGE, 68% prox LICA, mild RSCA sten, mod LSCA stenosis; Dr Maria Antonia Mata History     Socioeconomic History    Marital status:      Spouse name: Not on file    Number of children: Not on file    Years of education: Not on file    Highest education level: Not on file   Occupational History    Occupation: retired teacher [employer]   Social Needs    Financial resource strain: Not on file    Food insecurity:     Worry: Not on file     Inability: Not on file   [employer] needs:     Medical: Not on file     Non-medical: Not on file   Tobacco Use    Smoking status: Never Smoker    Smokeless tobacco: Never Used   Substance and Sexual Activity    Alcohol use:  Yes     Alcohol/week: 2.0 standard drinks     Types: 1 Glasses of wine, 1 Cans of beer per week     Comment: social glass once a year    Drug use: No    Sexual activity: Not on file   Lifestyle    Physical activity:     Days per week: Not on file     Minutes per session: Not on file    Stress: Not on file   Relationships    Social connections:     Talks on phone: Not on file     Gets together: Not on file     Attends Confucianism service: Not on file     Active member of club or organization: Not on file     Attends meetings of clubs or organizations: Not on file     Relationship status: Not on file    Intimate partner violence:     Fear of current or ex partner: Not on file     Emotionally abused: Not on file     Physically abused: Not on file     Forced sexual activity: Not on file   Other Topics Concern    Not on file   Social History Narrative    Not on file     Allergies   Allergen Reactions    Lopid [Gemfibrozil] Other (comments)     elev lfts    Penicillins Shortness of Breath and Nausea and Vomiting       Current Outpatient Medications   Medication Sig    diclofenac (VOLTAREN) 1 % gel Apply 4 g to affected area four (4) times daily. Left knee and left leg    NOVOLIN R REGULAR U-100 INSULN 100 unit/mL injection 7 Units by SubCUTAneous route.  NOVOLIN N NPH U-100 INSULIN 100 unit/mL injection inject 20 units subcutaneously every morning    pioglitazone (ACTOS) 15 mg tablet Take 15 mg by mouth daily.  carvedilol (COREG) 25 mg tablet Take 1 Tab by mouth two (2) times daily (with meals).  clopidogrel (PLAVIX) 75 mg tab take 1 tablet by mouth once daily    folic acid/multivit-min/lutein (CENTRUM SILVER PO) Take  by mouth.  atorvastatin (LIPITOR) 40 mg tablet Take 1 Tab by mouth daily.  metFORMIN (GLUCOPHAGE) 1,000 mg tablet Take 1,000 mg by mouth two (2) times daily (with meals).  amLODIPine (NORVASC) 10 mg tablet Take  by mouth daily.  olmesartan-hydrochlorothiazide (BENICAR HCT) 40-25 mg per tablet Take 1 Tab by mouth daily.  Cholecalciferol, Vitamin D3, (VITAMIN D) 5,000 unit Tab Take 2,000 Units by mouth. Takes 12 units am and 14 units pm     No current facility-administered medications for this visit.       REVIEW OF SYSTEMS: gyn>5 yrs, mammo 9/19, DEXA 11/13, sees Dr Mounika Zuniga, no podiatry, colo 12/13 Dr Cristi Lawson  Ophtho - no vision change or eye pain  Oral - no mouth pain, tongue or tooth problems  Ears - no hearing loss, ear pain, fullness, no swallowing problems  Cardiac - no CP, PND, orthopnea, edema, palpitations or syncope  Chest - no breast masses  Resp - no wheezing, chronic coughing, dyspnea  GI - no heartburn, nausea, vomiting, change in bowel habits, bleeding, hemorrhoids  Urinary - no dysuria, hematuria, flank pain, urgency, frequency  Genitals - no genital lesions, discharge, masses, ulceration, warts  Ortho - no swelling, dec ROM, myalgias    Visit Vitals  /70   Pulse 72   Temp 98.1 °F (36.7 °C) (Oral)   Resp 14   Ht 5' 3\" (1.6 m)   Wt 171 lb (77.6 kg)   SpO2 97%   BMI 30.29 kg/m²     A&O x3  Affect is appropriate. Mood stable  No apparent distress  Anicteric, no JVD, adenopathy or thyromegaly. No carotid bruits or radiated murmur  Lungs clear to auscultation, no wheezes or rales  Heart showed regular rate and rhythm. No murmur, rubs, gallops  Abdomen soft nontender, no hepatosplenomegaly or masses.    Ext without c/c/e    LABS  From 2/12 showed   gluc 330, cr 0.93, gfr 77,  alt 30, hba1c 11.2,                   chol 199, tg 366, hdl 35, ldl-c 67,   wbc 7.1, hb 15.2, plt 273,  tsh 2.20  From 6/12 showed                   hba1c 8.2,   ldl-p 1018, chol 112, tg 95,   hdl 32, ldl-c 61,   umar 1.5  From 12/12 showed                   hba1c 8.9,   ldl-p 1486, chol 132, tg 109, hdl 37, ldl-c 73  From 6/13 showed   gluc 128, cr 0.91, gfr 68,  alt 13, hba1c 7.8,                  chol 115, tg 157, hdl 28, ldl-c 56  From 12/13 showed                   hba1c 7.5,                  wbc 7.5, hb 13.2, plt 297,  vit d 69.2  From 6/14 showed   gluc 171, cr 0.85, gfr>60, alt<5,        chol 134, tg 165, hdl 26, ldl-c 73  From 12/14 showed        hba1c 9.2,   umar 4       chol 151, tg 256, hdl 33, ldl-c 67,     From 3/15 showed       hba1c 7.3,   umar neg           ldl-c 82,   From 7/15 showed   gluc 130, cr 1.16, gfr 49,  alt 15, hba1c 7.5,       chol 128, tg 271, hdl 29, ldl-c 45  From 12/15 showed        hba1c 9.6,   umar 7.0    chol 160, tg 218, hdl 33, ldl-c 83,     From 2/16 showed   gluc 117, cr 1.15, gfr 58,  alt 18, hba1c 7.9,   umar 4.7    chol 112, tg 184, hdl 30, ldl-c 45,   ,               vit d 45.3, uric 7.2  From 6/16 showed   gluc 98      hba1c 7.7,       chol 143, tg 178, hdl 31, ldl-c 76  From 12/16 showed gluc 170, cr 1.11, gfr 60,    hba1c 8.9,   umar 4.0  From 6/17 showed   gluc 148, cr 1.06, gfr>60,    hba1c 8.1,       chol 192, tg 202, hdl 39, ldl-c 113, wbc 6.3, hb 13.8, plt 281  From 1/18 showed   gluc 154, cr 1.08, gfr>60, alt 11, hba1c 8.0,   umar 16.0,                       vit d 40.7  From 4/18 showed   gluc 140, cr 0.90, gfr>60,    hba1c 8.5,       chol 216, tg 356, hdl 31, ldl-c na    wbc 7.0, hb 13.1, plt 244, ck/trop neg  From 5/18 showed        hba1c 8.1  From 4/19 showed   gluc 237, cr 0.98, gfr 69,  alt 14,        chol 121, tg 135, hdl 29, ldl-c 65  From 9/19 showed   gluc 218, cr 1.11, gfr 59,  alt 20, hba1c 10.8,  umar 17    chol 110, tg 127, hdl 30, ldl-c 55,  wbc 6.9, hb 13.5, plt 260,     Patient Active Problem List   Diagnosis Code    Hypovitaminosis D E55.9    Dyslipidemia E78.5    Essential hypertension I10    GERD without esophagitis K21.9    Advance directive discussed with patient Z70.80    Nephrolithiasis s/p EWSL Dr Nel Story 6/16 N20.0    Uncontrolled type 2 diabetes mellitus without complication, with long-term current use of insulin NP Mary Manning E11.65, Z79.4    TIA (transient ischemic attack) G45.9    Symptomatic carotid artery stenosis I65.29    Type 2 diabetes with nephropathy (HCC) E11.21    BMI 30.0-30.9,adult Z68.30     Assessment and plan:  1. Diabetes. F/u w ESTHER Manning, Dr Sebas High. 2.  Dyslipidemia. Continue current regimen. 3.  Hypertension. Continue current and she wants to work on exercise, diet and wt loss  4. GERD. PPI and avoidance measures  5. Hypovit d. Doing well   6. TIA. Continue current regimen. 7.  Nephrolithiasis. F/U Dr Nel Story  8. Vascular. F/U Dr Maria Esther Smith      RTC 6/20      Above conditions discussed at length and patient vocalized understanding.   All questions answered to patient satisfaction

## 2020-01-12 DIAGNOSIS — E78.5 DYSLIPIDEMIA: ICD-10-CM

## 2020-01-13 ENCOUNTER — OFFICE VISIT (OUTPATIENT)
Dept: ORTHOPEDIC SURGERY | Age: 70
End: 2020-01-13

## 2020-01-13 VITALS
HEIGHT: 63 IN | HEART RATE: 65 BPM | TEMPERATURE: 98 F | OXYGEN SATURATION: 98 % | BODY MASS INDEX: 30.48 KG/M2 | DIASTOLIC BLOOD PRESSURE: 74 MMHG | SYSTOLIC BLOOD PRESSURE: 132 MMHG | WEIGHT: 172 LBS

## 2020-01-13 DIAGNOSIS — M54.32 SCIATICA OF LEFT SIDE: ICD-10-CM

## 2020-01-13 DIAGNOSIS — M48.062 SPINAL STENOSIS OF LUMBAR REGION WITH NEUROGENIC CLAUDICATION: ICD-10-CM

## 2020-01-13 DIAGNOSIS — M43.16 SPONDYLOLISTHESIS OF LUMBAR REGION: Primary | ICD-10-CM

## 2020-01-13 RX ORDER — ATORVASTATIN CALCIUM 40 MG/1
TABLET, FILM COATED ORAL
Qty: 90 TAB | Refills: 3 | Status: SHIPPED | OUTPATIENT
Start: 2020-01-13 | End: 2021-03-11

## 2020-01-13 NOTE — PATIENT INSTRUCTIONS
Lumbar Spinal Stenosis: Care Instructions  Your Care Instructions    Stenosis in the spine is a narrowing of the canal that is around the spinal cord and nerve roots in your back. It can happen as part of aging. Sometimes bone and other tissue grow into this canal and press on the nerves that branch out from the spinal cord. This can cause pain, numbness, and weakness. When it happens in the lower part of your back, it is called lumbar spinal stenosis. It can cause problems in the legs, feet, and rear end (buttocks). You may be able to get relief from the symptoms of spinal stenosis by taking pain medicine. Your doctor may suggest physical therapy and exercises to keep your spine strong and flexible. Some people try steroid shots to reduce swelling. If pain and numbness in your legs are still so bad that you cannot do your normal activities, you may need surgery. Follow-up care is a key part of your treatment and safety. Be sure to make and go to all appointments, and call your doctor if you are having problems. It's also a good idea to know your test results and keep a list of the medicines you take. How can you care for yourself at home? · Take an over-the-counter pain medicine. Nonsteroidal anti-inflammatory drugs (NSAIDs) such as ibuprofen or naproxen seem to work best. But if you can't take NSAIDs, you can try acetaminophen. Be safe with medicines. Read and follow all instructions on the label. · Do not take two or more pain medicines at the same time unless the doctor told you to. Many pain medicines have acetaminophen, which is Tylenol. Too much acetaminophen (Tylenol) can be harmful. · Stay at a healthy weight. Being overweight puts extra strain on your spine. · Change positions often when you sit or stand. This can ease pain. It may also reduce pressure on the spinal cord and its nerves. · Avoid doing things that make your symptoms worse.  Walking downhill and standing for a long time may cause pain.  · Stretch and strengthen your back muscles as your doctor or physical therapist recommends. If your doctor says it is okay to do them, these exercises may help. ? Lie on your back with your knees bent. Gently pull one bent knee to your chest. Put that foot back on the floor, and then pull the other knee to your chest.  ? Do pelvic tilts. Lie on your back with your knees bent. Tighten your stomach muscles. Pull your belly button (navel) in and up toward your ribs. You should feel like your back is pressing to the floor and your hips and pelvis are slightly lifting off the floor. Hold for 6 seconds while breathing smoothly. ? Stand with your back flat against a wall. Slowly slide down until your knees are slightly bent. Hold for 10 seconds, then slide back up the wall. · Remove or change anything in your house that may cause you to fall. Keep walkways clear of clutter, electrical cords, and throw rugs. When should you call for help? Call 911 anytime you think you may need emergency care. For example, call if:    · You are unable to move a leg at all.   [facility] your doctor now or seek immediate medical care if:    · You have new or worse symptoms in your legs, belly, or buttocks. Symptoms may include:  ? Numbness or tingling. ? Weakness. ? Pain.     · You lose bladder or bowel control.    Watch closely for changes in your health, and be sure to contact your doctor if:    · You have a fever, lose weight, or don't feel well.     · You are not getting better as expected. Where can you learn more? Go to http://norma-soco.info/. Jayna Abraham in the search box to learn more about \"Lumbar Spinal Stenosis: Care Instructions. \"  Current as of: June 26, 2019  Content Version: 12.2  © 2982-6866 [employer]. Care instructions adapted under license by [employer] (which disclaims liability or warranty for this information).  If you have questions about a medical condition or this instruction, always ask your healthcare professional. [place] [address] any warranty or liability for your use of this information.

## 2020-01-13 NOTE — PROGRESS NOTES
Mallory Padilla presents today for   Chief Complaint   Patient presents with    Leg Pain     left       Is someone accompanying this pt? no    Is the patient using any DME equipment during OV? no    Depression Screening:  3 most recent PHQ Screens 11/15/2019   PHQ Not Done -   Little interest or pleasure in doing things Not at all   Feeling down, depressed, irritable, or hopeless Not at all   Total Score PHQ 2 0       Learning Assessment:  Learning Assessment 10/7/2019   PRIMARY LEARNER Patient   HIGHEST LEVEL OF EDUCATION - PRIMARY LEARNER  -   BARRIERS PRIMARY LEARNER -   PRIMARY LANGUAGE ENGLISH   LEARNER PREFERENCE PRIMARY VIDEOS   ANSWERED BY Patient   RELATIONSHIP SELF       Abuse Screening:  Abuse Screening Questionnaire 9/19/2019   Do you ever feel afraid of your partner? N   Are you in a relationship with someone who physically or mentally threatens you? N   Is it safe for you to go home? Y       Fall Risk  Fall Risk Assessment, last 12 mths 11/15/2019   Able to walk? Yes   Fall in past 12 months? No   Fall with injury? -   Number of falls in past 12 months -   Fall Risk Score -       OPIOID RISK TOOL  No flowsheet data found. Coordination of Care:  1. Have you been to the ER, urgent care clinic since your last visit? no  Hospitalized since your last visit? no    2. Have you seen or consulted any other health care providers outside of the [address] [place] [address] since your last visit? Yes, pcp  Include any pap smears or colon screening.  none

## 2020-01-13 NOTE — PROGRESS NOTES
[address], [address]  [place], [address] [place] [address] Street  Phone: [phone]  Fax: [phone]        Danuta Castro  : 1950  PCP: Archie Wiggins MD  2020    NEW PATIENT      HISTORY OF PRESENT ILLNESS  Glenys Calderon is a 71 y.o. female c/o intermittent distal, medial LLE to the top of the foot (mid calf to foot) that is worse with prolonged walking (0.5-1 hr) about 4x per month. She finds relief with sitting. She describes it as an aching, numbness. She denies weakness or back pain. Pt notes that she has not been maintaining an HEP as she previously experienced vertigo while working out and it caused her to fall, so she has been hesitant to return. Note from Dr. Lauro Hutchison dated 10/7/19 indicating patient was seen with c/o intermittent left leg pain with numbness, aching, and throbbing radiating to her foot. She feels more pain when standing and walking and feels like she is dragging her left foot when she walks. She denies any back pain. She has a h/o mini strokes. She had one larger stroke in 2017 that resulted in left side weakness and speech difficulty. She was prescribed compression stockings and Voltaren gel and referred here. She rates her pain as a 0/10 today. ASSESSMENT  Her symptoms may be due to a spondylolisthesis causing a lumbar spinal stenosis. It is not likely that she would benefit from physical therapy at this time. I offered a referral for an MRI, but she declines. PLAN  1. At this time, Pt would like to observe her symptoms and make some notes in regards to her symptoms. The patient does not think her pain complaints are severe enough to warrant additional workup/treatment at this time. 2. I advised she maintain an HEP. Pt will f/u in 6 months or sooner if needed. Diagnoses and all orders for this visit:    1. Spondylolisthesis of lumbar region    2.  Spinal stenosis of lumbar region with neurogenic claudication    3. Sciatica of left side  -     AMB POC XRAY, SPINE, LUMBOSACRAL; 2 O             CHIEF COMPLAINT  Glenys Godoy Handler is seen today in consultation at the request of Vinicio Roth MD for complaints of LLE pain.       PAST MEDICAL HISTORY   Past Medical History:   Diagnosis Date    Calculus of kidney 6/16    Dr Helen Jimenez; right ureteral stone s/p EWSL    Diabetes ([address]     Dr Alexandre Davis GERD (gastroesophageal reflux disease)     Hyperlipidemia     Hypertension     Hypovitaminosis D     Obesity     W - 12/19    TIA (transient ischemic attack) 04/2018       Past Surgical History:   Procedure Laterality Date    CARDIAC SURG PROCEDURE UNLIST  01/2018    holter few pac/pvc, 4 beat run svt rate [place]  08/2018    Dr Conner Griggs; NST neg, ef 76%    ECHO 2D ADULT  11/2017    conc lvh, ef 60%, DD (3/12); nl lv, ef 68%, conc lvh, no wma, mild dd, no valvular abn, neg bubble study (11/17 - [facility])    HX BREAST BIOPSY  2009    negative    HX COLONOSCOPY  12/13    negative Dr Jerri Nam t score 1.10 spine, 1.10 hip (11/13)    HX UROLOGICAL Right 06/10/2016    R ESWL-Dr. Subhash Bolanos- [facility]    NEUROLOGICAL PROCEDURE UNLISTED  11/2017    mri showed mod periventricular wm changes    STRESS TEST THALLIUM STUDY  3/12    negative w EF 70%    VASCULAR SURGERY PROCEDURE UNLIST  11/2017    carotids showed bilat <50% ICA, right vertebral art obst    VASCULAR SURGERY PROCEDURE UNLIST  12/2017    CTA head/neck showed no aneurysm, <50% right intracranial ICA, diminutive right vertebral art    VASCULAR SURGERY PROCEDURE UNLIST  04/2018    JUAN R 1.0, L 1.1    VASCULAR SURGERY PROCEDURE UNLIST  04/2018    MRA right vert artery not visualized due to occlusion/stenosis, left patent, prox PAIGE 50-60% stenosis    VASCULAR SURGERY PROCEDURE UNLIST  10/2019    CTA showed 42% PAIGE, 75% prox LICA, mild RSCA sten, mod LSCA stenosis; Dr Brayan Del Real MEDICATIONS    Current Outpatient Medications   Medication Sig Dispense Refill    diclofenac (VOLTAREN) 1 % gel Apply 4 g to affected area four (4) times daily. Left knee and left leg 5 Each 5    NOVOLIN R REGULAR U-100 INSULN 100 unit/mL injection 7 Units by SubCUTAneous route.  0    NOVOLIN N NPH U-100 INSULIN 100 unit/mL injection inject 20 units subcutaneously every morning  0    pioglitazone (ACTOS) 15 mg tablet Take 15 mg by mouth daily.  carvedilol (COREG) 25 mg tablet Take 1 Tab by mouth two (2) times daily (with meals). 180 Tab 3    clopidogrel (PLAVIX) 75 mg tab take 1 tablet by mouth once daily 90 Tab 3    folic acid/multivit-min/lutein (CENTRUM SILVER PO) Take  by mouth.  atorvastatin (LIPITOR) 40 mg tablet Take 1 Tab by mouth daily. 90 Tab 3    metFORMIN (GLUCOPHAGE) 1,000 mg tablet Take 1,000 mg by mouth two (2) times daily (with meals).  amLODIPine (NORVASC) 10 mg tablet Take  by mouth daily.  olmesartan-hydrochlorothiazide (BENICAR HCT) 40-25 mg per tablet Take 1 Tab by mouth daily.  Cholecalciferol, Vitamin D3, (VITAMIN D) 5,000 unit Tab Take 2,000 Units by mouth. Takes 12 units am and 14 units pm         ALLERGIES  Allergies   Allergen Reactions    Lopid [Gemfibrozil] Other (comments)     elev lfts    Penicillins Shortness of Breath and Nausea and Vomiting          SOCIAL HISTORY    Social History     Socioeconomic History    Marital status:      Spouse name: Not on file    Number of children: Not on file    Years of education: Not on file    Highest education level: Not on file   Occupational History    Occupation: retired teacher [employer]   Tobacco Use    Smoking status: Never Smoker    Smokeless tobacco: Never Used   Substance and Sexual Activity    Alcohol use:  Yes     Alcohol/week: 2.0 standard drinks     Types: 1 Glasses of wine, 1 Cans of beer per week     Comment: social glass once a year    Drug use: No       FAMILY HISTORY  Family History   Problem Relation Age of Onset    Diabetes Father     Hypertension Father     Cancer Sister     Hypertension Sister          REVIEW OF SYSTEMS  Review of Systems   Musculoskeletal: Negative for back pain. Distal LLE pain         PHYSICAL EXAMINATION  Visit Vitals  /74 (BP 1 Location: Left arm, BP Patient Position: Sitting)   Pulse 65   Temp 98 °F (36.7 °C) (Oral)   Ht 5' 3\" (1.6 m)   Wt 172 lb (78 kg)   SpO2 98% Comment: RA   BMI 30.47 kg/m²         Pain Assessment  1/13/2020   Location of Pain Leg   Location Modifiers Left   Severity of Pain 0   Quality of Pain Aching   Duration of Pain Persistent   Frequency of Pain Intermittent   Aggravating Factors Standing   Limiting Behavior Yes   Relieving Factors Other (Comment)   Relieving Factors Comment sitting   Result of Injury No         Constitutional:  Well developed, well nourished, in no acute distress. Psychiatric: Affect and mood are appropriate. HEENT: Normocephalic, atraumatic. Extraocular movements intact. Integumentary: No rashes or abrasions noted on exposed areas. Cardiovascular: Regular rate and rhythm. Pulmonary: Clear to auscultation bilaterally. SPINE/MUSCULOSKELETAL EXAM    Cervical spine:  Neck is midline. Normal muscle tone. No focal atrophy is noted. ROM pain free. Shoulder ROM intact. No tenderness to palpation. Negative Spurling's sign. Negative Tinel's sign. Negative Babb's sign. Sensation in the bilateral arms grossly intact to light touch. Thoracic spine:  Thoracic kyphosis     Lumbar spine:  No rash, ecchymosis, or gross obliquity. No fasciculations. No focal atrophy is noted. No pain with hip ROM. Full range of motion. No tenderness to palpation. No tenderness to palpation at the sciatic notch. SI joints non-tender. Trochanters non tender. Sensation in the bilateral legs grossly intact to light touch. Negative SLR.       MOTOR: Biceps  Triceps Deltoids Wrist Ext Wrist Flex Hand Intrin   Right 5/5 5/5 5/5 5/5 5/5 5/5   Left 5/5 5/5 5/5 5/5 5/5 5/5             Hip Flex  Quads Hamstrings Ankle DF EHL Ankle PF   Right 5/5 5/5 5/5 5/5 5/5 5/5   Left 5/5 5/5 5/5 5/5 5/5 5/5     DTRs are 2+ biceps, triceps, brachioradialis, patella, and Achilles. Squat not tested. No difficulty with tandem gait. Ambulation without assistive device. FWB. RADIOGRAPHS  2V Lumbar XR images taken on 1/13/19 personally reviewed with patient:  Facet sclerosis. Lean to the left. Anterolisthesis of L4 on L5. Disc space narrowing at L4-5. Atherosclerosis.  reviewed    Ms. Toro Perry has a reminder for a \"due or due soon\" health maintenance. I have asked that she contact her primary care provider for follow-up on this health maintenance. 19 minutes of face-to-face contact were spent with the patient during today's visit extensively discussing symptoms and treatment plan. All questions were answered. More than half of this visit today was spent on counseling. Written by Mark Chinchilla, as dictated by Dr. Agnieszka Olivier. I, Dr. Agnieszka Olivier, confirm that all documentation is accurate.

## 2020-02-26 ENCOUNTER — OFFICE VISIT (OUTPATIENT)
Dept: INTERNAL MEDICINE CLINIC | Age: 70
End: 2020-02-26

## 2020-02-26 VITALS
WEIGHT: 171 LBS | OXYGEN SATURATION: 97 % | HEIGHT: 63 IN | HEART RATE: 72 BPM | RESPIRATION RATE: 14 BRPM | SYSTOLIC BLOOD PRESSURE: 124 MMHG | DIASTOLIC BLOOD PRESSURE: 70 MMHG | BODY MASS INDEX: 30.3 KG/M2 | TEMPERATURE: 98.2 F

## 2020-02-26 DIAGNOSIS — J30.2 SEASONAL ALLERGIC RHINITIS, UNSPECIFIED TRIGGER: Primary | ICD-10-CM

## 2020-02-26 DIAGNOSIS — R05.9 COUGH: ICD-10-CM

## 2020-02-26 RX ORDER — FLUTICASONE PROPIONATE 50 MCG
SPRAY, SUSPENSION (ML) NASAL
Qty: 1 BOTTLE | Refills: 12 | Status: SHIPPED | OUTPATIENT
Start: 2020-02-26 | End: 2021-09-09 | Stop reason: SDUPTHER

## 2020-02-26 RX ORDER — CODEINE PHOSPHATE AND GUAIFENESIN 10; 100 MG/5ML; MG/5ML
5 SOLUTION ORAL
Qty: 100 ML | Refills: 0 | Status: SHIPPED | OUTPATIENT
Start: 2020-02-26 | End: 2020-03-04

## 2020-02-26 RX ORDER — EXENATIDE 2 MG/.65ML
2 INJECTION, SUSPENSION, EXTENDED RELEASE SUBCUTANEOUS
COMMUNITY

## 2020-02-26 RX ORDER — CODEINE PHOSPHATE AND GUAIFENESIN 10; 100 MG/5ML; MG/5ML
5 SOLUTION ORAL
Qty: 100 ML | Refills: 0 | Status: SHIPPED | OUTPATIENT
Start: 2020-02-26 | End: 2020-02-26 | Stop reason: SDUPTHER

## 2020-02-26 NOTE — PROGRESS NOTES
Georgina Fowler presents today for No chief complaint on file. Depression Screening:  3 most recent PHQ Screens 11/15/2019   PHQ Not Done -   Little interest or pleasure in doing things Not at all   Feeling down, depressed, irritable, or hopeless Not at all   Total Score PHQ 2 0       Learning Assessment:  Learning Assessment 10/7/2019   PRIMARY LEARNER Patient   HIGHEST LEVEL OF EDUCATION - PRIMARY LEARNER  -   BARRIERS PRIMARY LEARNER -   PRIMARY LANGUAGE ENGLISH   LEARNER PREFERENCE PRIMARY VIDEOS   ANSWERED BY Patient   RELATIONSHIP SELF       Abuse Screening:  Abuse Screening Questionnaire 9/19/2019   Do you ever feel afraid of your partner? N   Are you in a relationship with someone who physically or mentally threatens you? N   Is it safe for you to go home? Y       Fall Risk  Fall Risk Assessment, last 12 mths 11/15/2019   Able to walk? Yes   Fall in past 12 months? No   Fall with injury? -   Number of falls in past 12 months -   Fall Risk Score -           Coordination of Care:  1. Have you been to the ER, urgent care clinic since your last visit? Hospitalized since your last visit? no    2. Have you seen or consulted any other health care providers outside of the [address] [place] [address] since your last visit? Include any pap smears or colon screening.  no

## 2020-02-26 NOTE — PROGRESS NOTES
71 y.o. BLACK OR  female who presents for evaluation. She now admits to history of seasonal allergies, usually when winter transitions to the spring. For the last 10 days or so, she has been having sneezing, watery eyes with clear drainage, itchiness, rhinorrhea, minimal sore throat with postnasal drip and resulting dry cough. He has been using Coricidin HBP and Claritin, she thought about using Flonase but did not do so. No fevers, actual ear pain, facial pressure, purulent material, difficulty swallowing, enlarged lymph nodes, productive cough, GI complaints. She knows of no exposures to ill individuals. Past Medical History:   Diagnosis Date    Calculus of kidney 6/16    Dr Pamalee Gilford; right ureteral stone s/p EWSL    Diabetes ([facility] [address]     Dr Morton Ek GERD (gastroesophageal reflux disease)     Hyperlipidemia     Hypertension     Hypovitaminosis D     Obesity     W - 12/19    Seasonal allergic rhinitis     TIA (transient ischemic attack) 04/2018     Current Outpatient Medications   Medication Sig    exenatide microspheres (BYDUREON) 2 mg/0.65 mL pnij 2 mg by SubCUTAneous route every seven (7) days.  atorvastatin (LIPITOR) 40 mg tablet take 1 tablet by mouth once daily    NOVOLIN R REGULAR U-100 INSULN 100 unit/mL injection 7 Units by SubCUTAneous route.  NOVOLIN N NPH U-100 INSULIN 100 unit/mL injection 30 Units.  carvedilol (COREG) 25 mg tablet Take 1 Tab by mouth two (2) times daily (with meals).  clopidogrel (PLAVIX) 75 mg tab take 1 tablet by mouth once daily    folic acid/multivit-min/lutein (CENTRUM SILVER PO) Take 1 Tab by mouth daily.  metFORMIN (GLUCOPHAGE) 1,000 mg tablet Take 1,000 mg by mouth two (2) times daily (with meals).  amLODIPine (NORVASC) 10 mg tablet Take 10 mg by mouth daily.  olmesartan-hydrochlorothiazide (BENICAR HCT) 40-25 mg per tablet Take 1 Tab by mouth daily.       Cholecalciferol, Vitamin D3, (VITAMIN D) 5,000 unit Tab Take 5,000 Units by mouth daily.  diclofenac (VOLTAREN) 1 % gel Apply 4 g to affected area four (4) times daily. Left knee and left leg    pioglitazone (ACTOS) 15 mg tablet Take 15 mg by mouth daily. No current facility-administered medications for this visit. Allergies   Allergen Reactions    Lopid [Gemfibrozil] Other (comments)     elev lfts    Penicillins Shortness of Breath and Nausea and Vomiting     Visit Vitals  /70   Pulse 72   Temp 98.2 °F (36.8 °C) (Oral)   Resp 14   Ht 5' 3\" (1.6 m)   Wt 171 lb (77.6 kg)   SpO2 97%   BMI 30.29 kg/m²   Bilateral tympanic membranes were normal.  Sinuses nontender with mildly boggy mucosa, oropharynx otherwise benign, no adenopathy. Lungs are clear. Heart showed regular rate rhythm. Abdomen soft nontender    Assessment and plan:  1. Probable allergic rhinitis. Agree with antihistamines, add Flonase, I did give her cough syrup. Hold off on any antibiotics at this time, she will call if progressive symptoms. Above conditions discussed at length and patient vocalized understanding.   All questions answered to patient satisfaction

## 2020-03-19 ENCOUNTER — HOSPITAL ENCOUNTER (OUTPATIENT)
Dept: LAB | Age: 70
Discharge: HOME | End: 2020-03-19
Payer: MEDICARE

## 2020-03-19 ENCOUNTER — LAB ONLY (OUTPATIENT)
Dept: INTERNAL MEDICINE CLINIC | Age: 70
End: 2020-03-19

## 2020-03-19 DIAGNOSIS — I10 ESSENTIAL HYPERTENSION: ICD-10-CM

## 2020-03-19 DIAGNOSIS — E78.5 DYSLIPIDEMIA: Primary | ICD-10-CM

## 2020-03-19 DIAGNOSIS — E78.5 DYSLIPIDEMIA: ICD-10-CM

## 2020-03-19 LAB
ALBUMIN SERPL-MCNC: 3.5 G/DL (ref 3.4–5)
ALBUMIN/GLOB SERPL: 1 {RATIO} (ref 0.8–1.7)
ALP SERPL-CCNC: 123 U/L (ref 45–117)
ALT SERPL-CCNC: 18 U/L (ref 13–56)
ANION GAP SERPL CALC-SCNC: 6 MMOL/L (ref 3–18)
AST SERPL-CCNC: 11 U/L (ref 10–38)
BILIRUB SERPL-MCNC: 0.4 MG/DL (ref 0.2–1)
BUN SERPL-MCNC: 11 MG/DL (ref 7–18)
BUN/CREAT SERPL: 11 (ref 12–20)
CALCIUM SERPL-MCNC: 8.9 MG/DL (ref 8.5–10.1)
CHLORIDE SERPL-SCNC: 107 MMOL/L (ref 100–111)
CHOLEST SERPL-MCNC: 121 MG/DL
CO2 SERPL-SCNC: 29 MMOL/L (ref 21–32)
CREAT SERPL-MCNC: 1.04 MG/DL (ref 0.6–1.3)
ERYTHROCYTE [DISTWIDTH] IN BLOOD BY AUTOMATED COUNT: 13.7 % (ref 11.6–14.5)
EST. AVERAGE GLUCOSE BLD GHB EST-MCNC: 174 MG/DL
GLOBULIN SER CALC-MCNC: 3.6 G/DL (ref 2–4)
GLUCOSE SERPL-MCNC: 170 MG/DL (ref 74–99)
HBA1C MFR BLD: 7.7 % (ref 4.2–5.6)
HCT VFR BLD AUTO: 41.3 % (ref 35–45)
HDLC SERPL-MCNC: 36 MG/DL (ref 40–60)
HDLC SERPL: 3.4 {RATIO} (ref 0–5)
HGB BLD-MCNC: 13.6 G/DL (ref 12–16)
LDLC SERPL CALC-MCNC: 66.2 MG/DL (ref 0–100)
LIPID PROFILE,FLP: ABNORMAL
MCH RBC QN AUTO: 27.4 PG (ref 24–34)
MCHC RBC AUTO-ENTMCNC: 32.9 G/DL (ref 31–37)
MCV RBC AUTO: 83.1 FL (ref 74–97)
PLATELET # BLD AUTO: 288 K/UL (ref 135–420)
PMV BLD AUTO: 12.2 FL (ref 9.2–11.8)
POTASSIUM SERPL-SCNC: 4.5 MMOL/L (ref 3.5–5.5)
PROT SERPL-MCNC: 7.1 G/DL (ref 6.4–8.2)
RBC # BLD AUTO: 4.97 M/UL (ref 4.2–5.3)
SODIUM SERPL-SCNC: 142 MMOL/L (ref 136–145)
TRIGL SERPL-MCNC: 94 MG/DL (ref ?–150)
VLDLC SERPL CALC-MCNC: 18.8 MG/DL
WBC # BLD AUTO: 6 K/UL (ref 4.6–13.2)

## 2020-03-19 PROCEDURE — 80061 LIPID PANEL: CPT

## 2020-03-19 PROCEDURE — 85027 COMPLETE CBC AUTOMATED: CPT

## 2020-03-19 PROCEDURE — 83036 HEMOGLOBIN GLYCOSYLATED A1C: CPT

## 2020-03-19 PROCEDURE — 80053 COMPREHEN METABOLIC PANEL: CPT

## 2020-03-20 NOTE — PROGRESS NOTES
71 y.o. BLACK OR  female who presents for evaluation. She is now seeing Dr Usha Carolina who took off the actos, added bydureon, and adjusted the basal/bolus regimen. She's happy with the results. Denies polyuria, polydipsia, nocturia, vision change. No sfx to report and she admits the intake is less despite the weight going up     Reports no cardiovascular complaints. bp running in the 120-130s over 70s reliably on current regimen. She walks about 30 min with her sister at the park weather permitting a few times a week    Continues to see vascular and no new recs    No GI or Gu complaints. LAST MEDICARE WELLNESS EXAM: 12/29/15, 6/27/17, 7/16/18, 9/19/19    Past Medical History:   Diagnosis Date    Calculus of kidney 6/16    Dr Velia Cristina; right ureteral stone s/p EWSL    Diabetes ([facility] [address]     Dr Lila Celestin GERD (gastroesophageal reflux disease)     Hyperlipidemia     Hypertension     Hypovitaminosis D     Obesity     W - 12/19    Seasonal allergic rhinitis     TIA (transient ischemic attack) 04/2018     Past Surgical History:   Procedure Laterality Date    CARDIAC SURG PROCEDURE UNLIST  01/2018    holter few pac/pvc, 4 beat run svt rate 156    CARDIAC SURG PROCEDURE UNLIST      thallium neg ef 70% (3/12);  Dr Manjit Elizabeth; NST neg, ef 76%    ECHO 2D ADULT  11/2017    conc lvh, ef 60%, DD (3/12); nl lv, ef 68%, conc lvh, no wma, mild dd, no valvular abn, neg bubble study (11/17 - [facility])    HX BREAST BIOPSY  2009    negative    HX COLONOSCOPY  12/13    negative Dr Felicitas Daly t score 1.10 spine, 1.10 hip (11/13)    HX UROLOGICAL Right 06/10/2016    R ESWL-Dr. Klever Dale- [facility]    NEUROLOGICAL PROCEDURE UNLISTED  11/2017    mri showed mod periventricular wm changes    VASCULAR SURGERY PROCEDURE UNLIST  11/2017    carotids showed bilat <50% ICA, right vertebral art obst    VASCULAR SURGERY PROCEDURE UNLIST  12/2017    CTA head/neck showed no aneurysm, <50% right intracranial ICA, diminutive right vertebral art    VASCULAR SURGERY PROCEDURE UNLIST  04/2018    JUAN R 1.0, L 1.1    VASCULAR SURGERY PROCEDURE UNLIST  04/2018    MRA right vert artery not visualized due to occlusion/stenosis, left patent, prox PAIGE 50-60% stenosis    VASCULAR SURGERY PROCEDURE UNLIST  10/2019    CTA showed 26% PAIGE, 65% prox LICA, mild RSCA sten, mod LSCA stenosis; Dr Pichardo Sin History     Socioeconomic History    Marital status:      Spouse name: Not on file    Number of children: Not on file    Years of education: Not on file    Highest education level: Not on file   Occupational History    Occupation: retired teacher [place]   Social Needs    Financial resource strain: Not on file    Food insecurity     Worry: Not on file     Inability: Not on file   Spry needs     Medical: Not on file     Non-medical: Not on file   Tobacco Use    Smoking status: Never Smoker    Smokeless tobacco: Never Used   Substance and Sexual Activity    Alcohol use:  Yes     Alcohol/week: 2.0 standard drinks     Types: 1 Glasses of wine, 1 Cans of beer per week     Comment: social glass once a year    Drug use: No    Sexual activity: Not on file   Lifestyle    Physical activity     Days per week: Not on file     Minutes per session: Not on file    Stress: Not on file   Relationships    Social connections     Talks on phone: Not on file     Gets together: Not on file     Attends Anabaptism service: Not on file     Active member of club or organization: Not on file     Attends meetings of clubs or organizations: Not on file     Relationship status: Not on file    Intimate partner violence     Fear of current or ex partner: Not on file     Emotionally abused: Not on file     Physically abused: Not on file     Forced sexual activity: Not on file   Other Topics Concern    Not on file   Social History Narrative    Not on file     Allergies   Allergen Reactions    Lopid [Gemfibrozil] Other (comments)     elev lfts    Penicillins Shortness of Breath and Nausea and Vomiting       Current Outpatient Medications   Medication Sig    exenatide microspheres (BYDUREON) 2 mg/0.65 mL pnij 2 mg by SubCUTAneous route every seven (7) days.  fluticasone propionate (FLONASE) 50 mcg/actuation nasal spray 2 sprays each nostril daily    atorvastatin (LIPITOR) 40 mg tablet take 1 tablet by mouth once daily    diclofenac (VOLTAREN) 1 % gel Apply 4 g to affected area four (4) times daily. Left knee and left leg    NOVOLIN R REGULAR U-100 INSULN 100 unit/mL injection 7 Units by SubCUTAneous route.  NOVOLIN N NPH U-100 INSULIN 100 unit/mL injection 30 Units.  carvedilol (COREG) 25 mg tablet Take 1 Tab by mouth two (2) times daily (with meals).  clopidogrel (PLAVIX) 75 mg tab take 1 tablet by mouth once daily    folic acid/multivit-min/lutein (CENTRUM SILVER PO) Take 1 Tab by mouth daily.  metFORMIN (GLUCOPHAGE) 1,000 mg tablet Take 1,000 mg by mouth two (2) times daily (with meals).  amLODIPine (NORVASC) 10 mg tablet Take 10 mg by mouth daily.  olmesartan-hydrochlorothiazide (BENICAR HCT) 40-25 mg per tablet Take 1 Tab by mouth daily.  Cholecalciferol, Vitamin D3, (VITAMIN D) 5,000 unit Tab Take 5,000 Units by mouth daily. No current facility-administered medications for this visit.       REVIEW OF SYSTEMS: gyn>5 yrs, mammo 9/19, DEXA 11/13, sees Dr Stacy Aguiar, no podiatry, colo 12/13 Dr Tj Agee  Ophtho - no vision change or eye pain  Oral - no mouth pain, tongue or tooth problems  Ears - no hearing loss, ear pain, fullness, no swallowing problems  Cardiac - no CP, PND, orthopnea, edema, palpitations or syncope  Chest - no breast masses  Resp - no wheezing, chronic coughing, dyspnea  GI - no heartburn, nausea, vomiting, change in bowel habits, bleeding, hemorrhoids  Urinary - no dysuria, hematuria, flank pain, urgency, frequency  Genitals - no genital lesions, discharge, masses, ulceration, warts  Ortho - no swelling, dec ROM, myalgias    Visit Vitals  /80   Pulse 79   Temp 97.9 °F (36.6 °C) (Oral)   Resp 14   Ht 5' 3\" (1.6 m)   Wt 178 lb (80.7 kg)   SpO2 98%   BMI 31.53 kg/m²     A&O x3  Affect is appropriate. Mood stable  No apparent distress  Anicteric, no JVD, adenopathy or thyromegaly. No carotid bruits or radiated murmur  Lungs clear to auscultation, no wheezes or rales  Heart showed regular rate and rhythm. No murmur, rubs, gallops  Abdomen soft nontender, no hepatosplenomegaly or masses.    Ext without c/c/e    LABS  From 2/12 showed   gluc 330, cr 0.93, gfr 77,  alt 30, hba1c 11.2,                   chol 199, tg 366, hdl 35, ldl-c 67,   wbc 7.1, hb 15.2, plt 273,  tsh 2.20  From 6/12 showed                   hba1c 8.2,   ldl-p 1018, chol 112, tg 95,   hdl 32, ldl-c 61,   umar 1.5  From 12/12 showed                   hba1c 8.9,   ldl-p 1486, chol 132, tg 109, hdl 37, ldl-c 73  From 6/13 showed   gluc 128, cr 0.91, gfr 68,  alt 13, hba1c 7.8,                  chol 115, tg 157, hdl 28, ldl-c 56  From 12/13 showed                   hba1c 7.5,                  wbc 7.5, hb 13.2, plt 297,  vit d 69.2  From 6/14 showed   gluc 171, cr 0.85, gfr>60, alt<5,        chol 134, tg 165, hdl 26, ldl-c 73  From 12/14 showed        hba1c 9.2,   umar 4       chol 151, tg 256, hdl 33, ldl-c 67,     From 3/15 showed       hba1c 7.3,   umar neg           ldl-c 82,   From 7/15 showed   gluc 130, cr 1.16, gfr 49,  alt 15, hba1c 7.5,       chol 128, tg 271, hdl 29, ldl-c 45  From 12/15 showed        hba1c 9.6,   umar 7.0    chol 160, tg 218, hdl 33, ldl-c 83,     From 2/16 showed   gluc 117, cr 1.15, gfr 58,  alt 18, hba1c 7.9,   umar 4.7    chol 112, tg 184, hdl 30, ldl-c 45,   ,               vit d 45.3, uric 7.2  From 6/16 showed   gluc 98      hba1c 7.7,       chol 143, tg 178, hdl 31, ldl-c 76  From 12/16 showed gluc 170, cr 1.11, gfr 60,    hba1c 8.9,   umar 4.0  From 6/17 showed   gluc 148, cr 1.06, gfr>60,    hba1c 8.1,       chol 192, tg 202, hdl 39, ldl-c 113, wbc 6.3, hb 13.8, plt 281  From 1/18 showed   gluc 154, cr 1.08, gfr>60, alt 11, hba1c 8.0,   umar 16.0,                       vit d 40.7  From 4/18 showed   gluc 140, cr 0.90, gfr>60,    hba1c 8.5,       chol 216, tg 356, hdl 31, ldl-c na    wbc 7.0, hb 13.1, plt 244, ck/trop neg  From 5/18 showed        hba1c 8.1  From 4/19 showed   gluc 237, cr 0.98, gfr 69,  alt 14,        chol 121, tg 135, hdl 29, ldl-c 65  From 9/19 showed   gluc 218, cr 1.11, gfr 59,  alt 20, hba1c 10.8,  umar 17    chol 110, tg 127, hdl 30, ldl-c 55,  wbc 6.9, hb 13.5, plt 260    Results for orders placed or performed during the hospital encounter of 03/19/20   LIPID PANEL   Result Value Ref Range    LIPID PROFILE          Cholesterol, total 121 <200 MG/DL    Triglyceride 94 <150 MG/DL    HDL Cholesterol 36 (L) 40 - 60 MG/DL    LDL, calculated 66.2 0 - 100 MG/DL    VLDL, calculated 18.8 MG/DL    CHOL/HDL Ratio 3.4 0 - 5.0     METABOLIC PANEL, COMPREHENSIVE   Result Value Ref Range    Sodium 142 136 - 145 mmol/L    Potassium 4.5 3.5 - 5.5 mmol/L    Chloride 107 100 - 111 mmol/L    CO2 29 21 - 32 mmol/L    Anion gap 6 3.0 - 18 mmol/L    Glucose 170 (H) 74 - 99 mg/dL    BUN 11 7.0 - 18 MG/DL    Creatinine 1.04 0.6 - 1.3 MG/DL    BUN/Creatinine ratio 11 (L) 12 - 20      GFR est AA >60 >60 ml/min/1.73m2    GFR est non-AA 53 (L) >60 ml/min/1.73m2    Calcium 8.9 8.5 - 10.1 MG/DL    Bilirubin, total 0.4 0.2 - 1.0 MG/DL    ALT (SGPT) 18 13 - 56 U/L    AST (SGOT) 11 10 - 38 U/L    Alk.  phosphatase 123 (H) 45 - 117 U/L    Protein, total 7.1 6.4 - 8.2 g/dL    Albumin 3.5 3.4 - 5.0 g/dL    Globulin 3.6 2.0 - 4.0 g/dL    A-G Ratio 1.0 0.8 - 1.7     CBC W/O DIFF   Result Value Ref Range    WBC 6.0 4.6 - 13.2 K/uL    RBC 4.97 4.20 - 5.30 M/uL    HGB 13.6 12.0 - 16.0 g/dL    HCT 41.3 35.0 - 45.0 %    MCV 83.1 74.0 - 97.0 FL    MCH 27.4 24.0 - 34.0 PG    MCHC 32.9 31.0 - 37.0 g/dL    RDW 13.7 11.6 - 14.5 %    PLATELET 437 104 - 964 K/uL    MPV 12.2 (H) 9.2 - 11.8 FL   HEMOGLOBIN A1C WITH EAG   Result Value Ref Range    Hemoglobin A1c 7.7 (H) 4.2 - 5.6 %    Est. average glucose 174 mg/dL     Patient Active Problem List   Diagnosis Code    Hypovitaminosis D E55.9    Dyslipidemia E78.5    Essential hypertension I10    GERD without esophagitis K21.9    Advance directive discussed with patient Z70.80    Nephrolithiasis s/p EWSL Dr Sanya Armenta 6/16 N20.0    Uncontrolled type 2 diabetes mellitus without complication, with long-term current use of insulin NP [place] Runner E11.65, Z79.4    TIA (transient ischemic attack) G45.9    Symptomatic carotid artery stenosis I65.29    Type 2 diabetes with nephropathy (HCC) E11.21    BMI 30.0-30.9,adult Z68.30     Assessment and plan:  1. Diabetes. F/u w Dr Matt Renteria and doing much better, Dr Aliza Martinez. 2.  Dyslipidemia. Continue current regimen. 3.  Hypertension. Continue current  4. GERD. PPI and avoidance measures  5. Hypovit d. Doing well   6. TIA. Continue current regimen. 7.  Nephrolithiasis. F/U Dr Sanya Armenta  8. Vascular. F/U Dr Atlas Saint        [facility] 9/20    Above conditions discussed at length and patient vocalized understanding.   All questions answered to patient satisfaction

## 2020-03-26 ENCOUNTER — OFFICE VISIT (OUTPATIENT)
Dept: INTERNAL MEDICINE CLINIC | Age: 70
End: 2020-03-26

## 2020-03-26 VITALS
BODY MASS INDEX: 31.54 KG/M2 | HEART RATE: 79 BPM | DIASTOLIC BLOOD PRESSURE: 80 MMHG | SYSTOLIC BLOOD PRESSURE: 138 MMHG | WEIGHT: 178 LBS | HEIGHT: 63 IN | TEMPERATURE: 97.9 F | OXYGEN SATURATION: 98 % | RESPIRATION RATE: 14 BRPM

## 2020-03-26 DIAGNOSIS — E78.5 DYSLIPIDEMIA: ICD-10-CM

## 2020-03-26 DIAGNOSIS — N20.0 NEPHROLITHIASIS: ICD-10-CM

## 2020-03-26 DIAGNOSIS — E11.21 TYPE 2 DIABETES WITH NEPHROPATHY (HCC): Primary | ICD-10-CM

## 2020-03-26 DIAGNOSIS — G45.9 TIA (TRANSIENT ISCHEMIC ATTACK): ICD-10-CM

## 2020-03-26 DIAGNOSIS — I10 ESSENTIAL HYPERTENSION: ICD-10-CM

## 2020-03-26 DIAGNOSIS — I65.21 SYMPTOMATIC STENOSIS OF RIGHT CAROTID ARTERY: ICD-10-CM

## 2020-03-26 DIAGNOSIS — E55.9 HYPOVITAMINOSIS D: ICD-10-CM

## 2020-03-26 NOTE — PROGRESS NOTES
Samantha Rubia presents today for   Chief Complaint   Patient presents with    Diabetes     3 month follow up with labs              Depression Screening:  3 most recent PHQ Screens 2/26/2020   PHQ Not Done -   Little interest or pleasure in doing things Not at all   Feeling down, depressed, irritable, or hopeless Not at all   Total Score PHQ 2 0       Learning Assessment:  Learning Assessment 2/26/2020   PRIMARY LEARNER Patient   HIGHEST LEVEL OF EDUCATION - PRIMARY LEARNER  > 4 YEARS OF COLLEGE   BARRIERS PRIMARY LEARNER NONE   CO-LEARNER CAREGIVER No   PRIMARY LANGUAGE ENGLISH   LEARNER PREFERENCE PRIMARY VIDEOS   ANSWERED BY [place]   RELATIONSHIP SELF       Abuse Screening:  Abuse Screening Questionnaire 2/26/2020   Do you ever feel afraid of your partner? N   Are you in a relationship with someone who physically or mentally threatens you? N   Is it safe for you to go home? Y       Fall Risk  Fall Risk Assessment, last 12 mths 2/26/2020   Able to walk? Yes   Fall in past 12 months? No   Fall with injury? -   Number of falls in past 12 months -   Fall Risk Score -           Coordination of Care:  1. Have you been to the ER, urgent care clinic since your last visit? Hospitalized since your last visit? no    2. Have you seen or consulted any other health care providers outside of the [address] [place] [address] since your last visit? Include any pap smears or colon screening.  no

## 2020-03-26 NOTE — PROGRESS NOTES
Discussion about weight loss    Body mass index is 31.53 kg/m². Vitals 3/26/2020 2/26/2020 1/13/2020 12/23/2019   Weight 178 lb 171 lb 172 lb 171 lb   SpO2 98 97 98 97   BSA 1.89 m2 1.86 m2 1.86 m2 1.86 m2   BMI 31.53 kg/m2 30.29 kg/m2 30.47 kg/m2 30.29 kg/m2     Discussion about modifying behaviors regarding diet and exercise undertaken    Increase activity as tolerated   - she's now walking regularly with her sister around the park    Cut back carbs and fatty foods.     Calorie counting would be ideal   - work in progress as she's trying to cut carbs    Option of appetite suppressants discussed briefly and declined   - due to concerns about sfx and cost    Discussed sending to nutritionist for further teaching declined    Intermittent fasting to be considered in future    We reiterated target of up to 5% wt loss as being realistic over the next 6-12 months and possibly aiming for higher than that in the future     Will come back for reevaluation and further management at subsequent visits which will be determined by patient response    Total time 15 min

## 2020-05-15 ENCOUNTER — OFFICE VISIT (OUTPATIENT)
Dept: VASCULAR SURGERY | Age: 70
End: 2020-05-15

## 2020-05-15 VITALS
RESPIRATION RATE: 16 BRPM | DIASTOLIC BLOOD PRESSURE: 84 MMHG | SYSTOLIC BLOOD PRESSURE: 162 MMHG | HEIGHT: 63 IN | WEIGHT: 178 LBS | BODY MASS INDEX: 31.54 KG/M2

## 2020-05-15 DIAGNOSIS — I65.23 BILATERAL CAROTID ARTERY STENOSIS: Primary | ICD-10-CM

## 2020-05-15 NOTE — PROGRESS NOTES
[facility]    Chief Complaint   Patient presents with    Carotid Artery Stenosis    Leg Pain       History and Physical    Most pleasant 58-year-old female here in follow-up today regarding her TIA. She is been compliant with her best medical therapy, she had no recurrence of symptoms. She is on Lipitor, Plavix, and blood pressure control. Her blood pressure is elevated today but she is not had her meds yet. No chest pain no shortness of breath no new complaints to report. Past Medical History:   Diagnosis Date    Calculus of kidney 6/16    Dr Juju Wolff; right ureteral stone s/p EWSL    Diabetes ([facility] [address]     Dr Lord Nearing GERD (gastroesophageal reflux disease)     Hyperlipidemia     Hypertension     Hypovitaminosis D     Obesity     W - 12/19    Seasonal allergic rhinitis     TIA (transient ischemic attack) 04/2018     Patient Active Problem List   Diagnosis Code    Hypovitaminosis D E55.9    Dyslipidemia E78.5    Essential hypertension I10    GERD without esophagitis K21.9    Advance directive discussed with patient Z70.80    Nephrolithiasis s/p EWSL Dr Juju Wolff 6/16 N20.0    Uncontrolled type 2 diabetes mellitus without complication, with long-term current use of insulin NP Ashok Penta    TIA (transient ischemic attack) G45.9    Symptomatic carotid artery stenosis I65.29    Type 2 diabetes with nephropathy ([facility] [address] E11.21    BMI 31.0-31.9,adult Z68.31     Past Surgical History:   Procedure Laterality Date    CARDIAC SURG PROCEDURE UNLIST  01/2018    holter few pac/pvc, 4 beat run svt rate 156    CARDIAC SURG PROCEDURE UNLIST      thallium neg ef 70% (3/12);  Dr Shawn Wise; NST neg, ef 76%    ECHO 2D ADULT  11/2017    conc lvh, ef 60%, DD (3/12); nl lv, ef 68%, conc lvh, no wma, mild dd, no valvular abn, neg bubble study (11/17 - [facility])    HX BREAST BIOPSY  2009    negative    HX COLONOSCOPY  12/13    negative Dr Karyna Goldstein t score 1.10 spine, 1.10 hip (11/13)    HX UROLOGICAL Right 06/10/2016    R ESWL-Dr. Braden Marie- [facility]    NEUROLOGICAL PROCEDURE UNLISTED  11/2017    mri showed mod periventricular wm changes    VASCULAR SURGERY PROCEDURE UNLIST  11/2017    carotids showed bilat <50% ICA, right vertebral art obst    VASCULAR SURGERY PROCEDURE UNLIST  12/2017    CTA head/neck showed no aneurysm, <50% right intracranial ICA, diminutive right vertebral art    VASCULAR SURGERY PROCEDURE UNLIST  04/2018    JUAN R 1.0, L 1.1    VASCULAR SURGERY PROCEDURE UNLIST  04/2018    MRA right vert artery not visualized due to occlusion/stenosis, left patent, prox PAIGE 50-60% stenosis    VASCULAR SURGERY PROCEDURE UNLIST  10/2019    CTA showed 36% PAIGE, 76% prox LICA, mild RSCA sten, mod LSCA stenosis; Dr Buckley Client     Current Outpatient Medications   Medication Sig Dispense Refill    exenatide microspheres (BYDUREON) 2 mg/0.65 mL pnij 2 mg by SubCUTAneous route every seven (7) days.  fluticasone propionate (FLONASE) 50 mcg/actuation nasal spray 2 sprays each nostril daily 1 Bottle 12    atorvastatin (LIPITOR) 40 mg tablet take 1 tablet by mouth once daily 90 Tab 3    diclofenac (VOLTAREN) 1 % gel Apply 4 g to affected area four (4) times daily. Left knee and left leg 5 Each 5    NOVOLIN R REGULAR U-100 INSULN 100 unit/mL injection 7 Units by SubCUTAneous route.  0    NOVOLIN N NPH U-100 INSULIN 100 unit/mL injection 30 Units. 0    carvedilol (COREG) 25 mg tablet Take 1 Tab by mouth two (2) times daily (with meals). 180 Tab 3    clopidogrel (PLAVIX) 75 mg tab take 1 tablet by mouth once daily 90 Tab 3    folic acid/multivit-min/lutein (CENTRUM SILVER PO) Take 1 Tab by mouth daily.  metFORMIN (GLUCOPHAGE) 1,000 mg tablet Take 1,000 mg by mouth two (2) times daily (with meals).  amLODIPine (NORVASC) 10 mg tablet Take 10 mg by mouth daily.  olmesartan-hydrochlorothiazide (BENICAR HCT) 40-25 mg per tablet Take 1 Tab by mouth daily.         Cholecalciferol, Vitamin D3, (VITAMIN D) 5,000 unit Tab Take 5,000 Units by mouth daily. Allergies   Allergen Reactions    Lopid [Gemfibrozil] Other (comments)     elev lfts    Penicillins Shortness of Breath and Nausea and Vomiting       Review of Systems    A full review of systems was completed times ten organ systems and was deemed negative unless otherwise mentioned in the HPI. Physical   Visit Vitals  /84 (BP 1 Location: Left arm, BP Patient Position: Sitting)   Resp 16   Ht 5' 3\" (1.6 m)   Wt 178 lb (80.7 kg)   BMI 31.53 kg/m²       She looks well today she is in no distress  Head is normocephalic  Neck no JVD  Chest clear  Cardiac regular  Abdomen soft flat nontender  Extremities range of motion strength are equal  No peripheral edema notable  Skin is intact appears normal no signs acute arterial insufficiency  Lower extremity Doppler shows triphasic waveforms throughout no signs of arterial insufficiency, calcification is notable  Carotid study shows moderate carotid stenosis without critical stenosis. Impression/Plan:     ICD-10-CM ICD-9-CM    1. Bilateral carotid artery stenosis I65.23 433.10 DUPLEX CAROTID BILATERAL     433.30      No orders of the defined types were placed in this encounter. Continue with best medical therapy I will see her back in 6 months. If the carotid vein is stable we will go to annual      Dina Tee MD    PLEASE NOTE:  This document has been produced using voice recognition software. Unrecognized errors in transcription may be present.

## 2020-05-15 NOTE — PROGRESS NOTES
1. Have you been to an emergency room or urgent care clinic since your last visit? No    Hospitalized since your last visit? If yes, where, when, and reason for visit? No  2. Have you seen or consulted any other health care providers outside of the [facility] since your last visit including any procedures, health maintenance items. If yes, where, when and reason for visit?  NO

## 2020-05-19 DIAGNOSIS — G45.9 TRANSIENT CEREBRAL ISCHEMIA, UNSPECIFIED TYPE: ICD-10-CM

## 2020-05-20 RX ORDER — CLOPIDOGREL BISULFATE 75 MG/1
TABLET ORAL
Qty: 90 TAB | Refills: 3 | Status: SHIPPED | OUTPATIENT
Start: 2020-05-20 | End: 2020-08-12

## 2020-06-23 ENCOUNTER — HOSPITAL ENCOUNTER (OUTPATIENT)
Dept: LAB | Age: 70
Discharge: HOME | End: 2020-06-23
Payer: MEDICARE

## 2020-06-23 ENCOUNTER — APPOINTMENT (OUTPATIENT)
Dept: INTERNAL MEDICINE CLINIC | Age: 70
End: 2020-06-23

## 2020-06-23 DIAGNOSIS — E11.21 TYPE 2 DIABETES WITH NEPHROPATHY (HCC): ICD-10-CM

## 2020-06-23 DIAGNOSIS — E55.9 HYPOVITAMINOSIS D: ICD-10-CM

## 2020-06-23 LAB
25(OH)D3 SERPL-MCNC: 69.3 NG/ML (ref 30–100)
CREAT UR-MCNC: 475 MG/DL (ref 30–125)
HBA1C MFR BLD: 7.5 % (ref 4.2–5.6)
MICROALBUMIN UR-MCNC: 7.61 MG/DL (ref 0–3)
MICROALBUMIN/CREAT UR-RTO: 16 MG/G (ref 0–30)

## 2020-06-23 PROCEDURE — 83036 HEMOGLOBIN GLYCOSYLATED A1C: CPT

## 2020-06-23 PROCEDURE — 82043 UR ALBUMIN QUANTITATIVE: CPT

## 2020-06-23 PROCEDURE — 82306 VITAMIN D 25 HYDROXY: CPT

## 2020-06-23 PROCEDURE — 36415 COLL VENOUS BLD VENIPUNCTURE: CPT

## 2020-08-12 DIAGNOSIS — G45.9 TRANSIENT CEREBRAL ISCHEMIA, UNSPECIFIED TYPE: ICD-10-CM

## 2020-08-12 RX ORDER — CLOPIDOGREL BISULFATE 75 MG/1
TABLET ORAL
Qty: 90 TAB | Refills: 3 | Status: SHIPPED | OUTPATIENT
Start: 2020-08-12 | End: 2021-06-01

## 2020-09-08 LAB — MAMMOGRAPHY, EXTERNAL: NORMAL

## 2020-09-09 NOTE — PROGRESS NOTES
79 y.o. BLACK OR  female who presents for evaluation. She has f/u with Dr Analia Gee on 9/29/20. Denies polyuria, polydipsia, nocturia, vision change. Weight is acutally down as below    Vitals 9/14/2020 6/15/2020 5/15/2020 3/26/2020 3/26/2020 2/26/2020   Weight 166 lb 164 lb 178 lb  178 lb 171 lb     Reports no cardiovascular complaints. bp running in the 120-130s over 70s reliably on current regimen. She has not been walking as much as she used to mainly due to weather and motivational issues    Continues to see vascular and no new recs    No GI or Gu complaints. She feels sad with the recent death of one of her closest friends and also a niece. Denies minor depression though    LAST MEDICARE WELLNESS EXAM: 12/29/15, 6/27/17, 7/16/18, 9/19/19, 9/14/20    Past Medical History:   Diagnosis Date    Diabetes ([facility] [address]     Dr Penny Strong GERD (gastroesophageal reflux disease)     Hyperlipidemia     Hypertension     Hypovitaminosis D     Nephrolithiasis 06/2016    Dr Adriane Khalil; right ureteral stone s/p EWSL    Obesity     W - 12/19    Seasonal allergic rhinitis     TIA (transient ischemic attack) 04/2018     Past Surgical History:   Procedure Laterality Date    CARDIAC SURG PROCEDURE UNLIST  01/2018    holter few pac/pvc, 4 beat run svt rate 156    CARDIAC SURG PROCEDURE UNLIST      thallium neg ef 70% (3/12);  Dr Belia Hooper; NST neg, ef 76%    ECHO 2D ADULT  11/2017    conc lvh, ef 60%, DD (3/12); nl lv, ef 68%, conc lvh, no wma, mild dd, no valvular abn, neg bubble study (11/17 - [address])    HX BREAST BIOPSY  2009    negative    HX COLONOSCOPY      Dr Adair Martinez 12/13 neg    HX ORTHOPAEDIC      DEXA t score 1.10 spine, 1.10 hip (11/13)    HX UROLOGICAL  06/10/2016    RIGHT ESWL Dr. Evelyn Collins  11/2017    mri showed mod periventricular wm changes    VASCULAR SURGERY PROCEDURE UNLIST  11/2017    BICA<50%, RIGHT vertebral art obst    VASCULAR SURGERY PROCEDURE UNLIST  12/2017    CTA head/neck showed no aneurysm, <50% right intracranial ICA, diminutive right vertebral art    VASCULAR SURGERY PROCEDURE UNLIST  04/2018    JUAN R 1.0, L 1.1    VASCULAR SURGERY PROCEDURE UNLIST  04/2018    MRA right vert artery not visualized due to occlusion/stenosis, left patent, prox PAIGE 50-60% stenosis    VASCULAR SURGERY PROCEDURE UNLIST  10/2019    CTA showed 97% PAIGE, 32% prox LICA, mild RSCA sten, mod LSCA stenosis; Dr Clara Warren History     Socioeconomic History    Marital status:      Spouse name: Not on file    Number of children: Not on file    Years of education: Not on file    Highest education level: Not on file   Occupational History    Occupation: retired teacher [employer]   Social Needs    Financial resource strain: Not on file    Food insecurity     Worry: Not on file     Inability: Not on file   [employer] needs     Medical: Not on file     Non-medical: Not on file   Tobacco Use    Smoking status: Never Smoker    Smokeless tobacco: Never Used   Substance and Sexual Activity    Alcohol use:  Yes     Alcohol/week: 2.0 standard drinks     Types: 1 Glasses of wine, 1 Cans of beer per week     Comment: social glass once a year    Drug use: No    Sexual activity: Not on file   Lifestyle    Physical activity     Days per week: Not on file     Minutes per session: Not on file    Stress: Not on file   Relationships    Social connections     Talks on phone: Not on file     Gets together: Not on file     Attends [employer] service: Not on file     Active member of club or organization: Not on file     Attends meetings of clubs or organizations: Not on file     Relationship status: Not on file    Intimate partner violence     Fear of current or ex partner: Not on file     Emotionally abused: Not on file     Physically abused: Not on file     Forced sexual activity: Not on file   Other Topics Concern    Not on file   Social History Narrative    Not on file     Allergies   Allergen Reactions    Lopid [Gemfibrozil] Other (comments)     elev lfts    Penicillins Shortness of Breath and Nausea and Vomiting       Current Outpatient Medications   Medication Sig    clopidogreL (PLAVIX) 75 mg tab take 1 tablet by mouth once daily    olmesartan (BENICAR) 40 mg tablet     exenatide microspheres (Bydureon) 2 mg/0.65 mL pnij 2 mg by SubCUTAneous route every seven (7) days.  fluticasone propionate (FLONASE) 50 mcg/actuation nasal spray 2 sprays each nostril daily    atorvastatin (LIPITOR) 40 mg tablet take 1 tablet by mouth once daily    diclofenac (VOLTAREN) 1 % gel Apply 4 g to affected area four (4) times daily. Left knee and left leg    NOVOLIN R REGULAR U-100 INSULN 100 unit/mL injection 7 Units by SubCUTAneous route.  NOVOLIN N NPH U-100 INSULIN 100 unit/mL injection 30 Units.  carvedilol (COREG) 25 mg tablet Take 1 Tab by mouth two (2) times daily (with meals).  folic acid/multivit-min/lutein (CENTRUM SILVER PO) Take 1 Tab by mouth daily.  metFORMIN (GLUCOPHAGE) 1,000 mg tablet Take 1,000 mg by mouth two (2) times daily (with meals).  amLODIPine (NORVASC) 10 mg tablet Take 10 mg by mouth daily.  Cholecalciferol, Vitamin D3, (VITAMIN D) 5,000 unit Tab Take 5,000 Units by mouth daily. No current facility-administered medications for this visit.       REVIEW OF SYSTEMS: gyn>5 yrs, mammo 9/19, DEXA 11/13, sees Dr David Velazquez, no podiatry, colo 12/13 Dr Shankar Patel  Ophtho - no vision change or eye pain  Oral - no mouth pain, tongue or tooth problems  Ears - no hearing loss, ear pain, fullness, no swallowing problems  Cardiac - no CP, PND, orthopnea, edema, palpitations or syncope  Chest - no breast masses  Resp - no wheezing, chronic coughing, dyspnea  GI - no heartburn, nausea, vomiting, change in bowel habits, bleeding, hemorrhoids  Urinary - no dysuria, hematuria, flank pain, urgency, frequency  Genitals - no genital lesions, discharge, masses, ulceration, warts  Ortho - no swelling, dec ROM, myalgias    Visit Vitals  /79   Pulse 70   Temp 96.8 °F (36 °C) (Temporal)   Resp 14   Ht 5' 3\" (1.6 m)   Wt 166 lb (75.3 kg)   SpO2 97%   BMI 29.41 kg/m²     A&O x3  Affect is appropriate. Mood stable  No apparent distress  Anicteric, no JVD, adenopathy or thyromegaly. No carotid bruits or radiated murmur  Lungs clear to auscultation, no wheezes or rales  Heart showed regular rate and rhythm. No murmur, rubs, gallops  Abdomen soft nontender, no hepatosplenomegaly or masses.    Ext without c/c/e    LABS  From 2/12 showed   gluc 330, cr 0.93, gfr 77,  alt 30, hba1c 11.2,                   chol 199, tg 366, hdl 35, ldl-c 67,   wbc 7.1, hb 15.2, plt 273,  tsh 2.20  From 6/12 showed                   hba1c 8.2,   ldl-p 1018, chol 112, tg 95,   hdl 32, ldl-c 61,   umar 1.5  From 12/12 showed                   hba1c 8.9,   ldl-p 1486, chol 132, tg 109, hdl 37, ldl-c 73  From 6/13 showed   gluc 128, cr 0.91, gfr 68,  alt 13, hba1c 7.8,                  chol 115, tg 157, hdl 28, ldl-c 56  From 12/13 showed                   hba1c 7.5,                  wbc 7.5, hb 13.2, plt 297,  vit d 69.2  From 6/14 showed   gluc 171, cr 0.85, gfr>60, alt<5,        chol 134, tg 165, hdl 26, ldl-c 73  From 12/14 showed        hba1c 9.2,   umar 4       chol 151, tg 256, hdl 33, ldl-c 67,     From 3/15 showed       hba1c 7.3,   umar neg           ldl-c 82,   From 7/15 showed   gluc 130, cr 1.16, gfr 49,  alt 15, hba1c 7.5,       chol 128, tg 271, hdl 29, ldl-c 45  From 12/15 showed        hba1c 9.6,   umar 7.0    chol 160, tg 218, hdl 33, ldl-c 83,     From 2/16 showed   gluc 117, cr 1.15, gfr 58,  alt 18, hba1c 7.9,   umar 4.7    chol 112, tg 184, hdl 30, ldl-c 45,   ,                       vit d 45.3, uric 7.2  From 6/16 showed   gluc 98      hba1c 7.7,       chol 143, tg 178, hdl 31, ldl-c 76  From 12/16 showed gluc 170, cr 1.11, gfr 60,    hba1c 8.9, umar 4.0  From 6/17 showed   gluc 148, cr 1.06, gfr>60,    hba1c 8.1,       chol 192, tg 202, hdl 39, ldl-c 113, wbc 6.3, hb 13.8, plt 281  From 1/18 showed   gluc 154, cr 1.08, gfr>60, alt 11, hba1c 8.0,   umar 16.0,                              vit d 40.7  From 4/18 showed   gluc 140, cr 0.90, gfr>60,    hba1c 8.5,       chol 216, tg 356, hdl 31, ldl-c na    wbc 7.0, hb 13.1, plt 244, ck/trop neg  From 5/18 showed        hba1c 8.1  From 4/19 showed   gluc 237, cr 0.98, gfr 69,  alt 14,        chol 121, tg 135, hdl 29, ldl-c 65  From 9/19 showed   gluc 218, cr 1.11, gfr 59,  alt 20, hba1c 10.8,  umar 17    chol 110, tg 127, hdl 30, ldl-c 55,   wbc 6.9, hb 13.5, plt 260  From 3/20 showed   gluc 170, cr 1.04, gfr>60, alt 18, hba1c 7.7,       chol 121, tg 94,   hdl 36, ldl-c 66,   wbc 6.0, hb 13.6, plt 288    Results for orders placed or performed during the hospital encounter of 06/23/20   MICROALBUMIN, UR, RAND W/ MICROALB/CREAT RATIO   Result Value Ref Range    Microalbumin,urine random 7.61 (H) 0 - 3.0 MG/DL    Creatinine, urine 475.00 (H) 30 - 125 mg/dL    Microalbumin/Creat ratio (mg/g creat) 16 0 - 30 mg/g   HEMOGLOBIN A1C W/O EAG   Result Value Ref Range    Hemoglobin A1c 7.5 (H) 4.2 - 5.6 %   VITAMIN D, 25 HYDROXY   Result Value Ref Range    Vitamin D 25-Hydroxy 69.3 30 - 100 ng/mL     We reviewed the patient's labs from the last several visits to point out trends in the numbers      Patient Active Problem List   Diagnosis Code    Hypovitaminosis D E55.9    Dyslipidemia E78.5    Essential hypertension I10    GERD without esophagitis K21.9    Advance directive discussed with patient Z70.80    Nephrolithiasis s/p EWSL Dr Marc Joe 6/16 N20.0    Uncontrolled type 2 diabetes mellitus without complication, with long-term current use of insulin ESTHER Pearson    TIA (transient ischemic attack) G45.9    Symptomatic carotid artery stenosis I65.29    Type 2 diabetes with nephropathy ([facility] [address] E11.21    BMI 31.0-31.9,adult Z68.31     Assessment and plan:  1. Diabetes. F/u w Dr Maria Isabel Porter and doing much better, Dr Jaida Thomas. 2.  Dyslipidemia. Continue current regimen. 3.  Hypertension. Continue current  4. GERD. PPI and avoidance measures  5. Hypovit d. Doing well   6. TIA. Continue current regimen. 7.  Nephrolithiasis. F/U Dr Kyle Ruiz  8. Vascular. F/U Dr Fantasma Keller  9. Grief reaction. Call if progressively worsening      RTC 3/21    Above conditions discussed at length and patient vocalized understanding.   All questions answered to patient satisfaction

## 2020-09-10 ENCOUNTER — TELEPHONE (OUTPATIENT)
Dept: INTERNAL MEDICINE CLINIC | Age: 70
End: 2020-09-10

## 2020-09-11 NOTE — TELEPHONE ENCOUNTER
Patient had labs done in June and never had a follow up on those labs. I told patient if Wale Jose wanted any additional labs he would obtain the day of visit.

## 2020-09-14 ENCOUNTER — OFFICE VISIT (OUTPATIENT)
Dept: INTERNAL MEDICINE CLINIC | Age: 70
End: 2020-09-14

## 2020-09-14 DIAGNOSIS — K21.9 GERD WITHOUT ESOPHAGITIS: ICD-10-CM

## 2020-09-14 DIAGNOSIS — G45.9 TIA (TRANSIENT ISCHEMIC ATTACK): ICD-10-CM

## 2020-09-14 DIAGNOSIS — Z23 NEEDS FLU SHOT: ICD-10-CM

## 2020-09-14 DIAGNOSIS — E66.3 OVERWEIGHT WITH BODY MASS INDEX (BMI) OF 25 TO 25.9 IN ADULT: ICD-10-CM

## 2020-09-14 DIAGNOSIS — Z13.39 SCREENING FOR ALCOHOLISM: ICD-10-CM

## 2020-09-14 DIAGNOSIS — Z00.00 MEDICARE ANNUAL WELLNESS VISIT, SUBSEQUENT: Primary | ICD-10-CM

## 2020-09-14 DIAGNOSIS — E11.21 TYPE 2 DIABETES WITH NEPHROPATHY (HCC): ICD-10-CM

## 2020-09-14 DIAGNOSIS — I10 ESSENTIAL HYPERTENSION: ICD-10-CM

## 2020-09-14 DIAGNOSIS — Z71.89 ADVANCED DIRECTIVES, COUNSELING/DISCUSSION: ICD-10-CM

## 2020-09-14 DIAGNOSIS — E78.5 DYSLIPIDEMIA: ICD-10-CM

## 2020-09-14 NOTE — PROGRESS NOTES
[address] [facility] 1950 female who presents for routine immunizations. Patient denies any symptoms , reactions or allergies that would exclude them from being immunized today. Risks and adverse reactions were discussed and the VIS was given to them. All questions were addressed. Order placed for influenza,  per Verbal Order from LEANDER Coats with read back. Patient was observed for 15 min post injection. There were no reactions observed.     Lyssa Castillo LPN

## 2020-09-16 VITALS
BODY MASS INDEX: 29.41 KG/M2 | RESPIRATION RATE: 14 BRPM | HEIGHT: 63 IN | HEART RATE: 70 BPM | WEIGHT: 166 LBS | SYSTOLIC BLOOD PRESSURE: 134 MMHG | DIASTOLIC BLOOD PRESSURE: 79 MMHG | TEMPERATURE: 96.8 F | OXYGEN SATURATION: 97 %

## 2020-09-16 PROBLEM — E66.3 OVERWEIGHT WITH BODY MASS INDEX (BMI) OF 25 TO 25.9 IN ADULT: Status: ACTIVE | Noted: 2020-09-16

## 2020-09-16 NOTE — PROGRESS NOTES
This is a subsequent medicare wellness visit    I have reviewed the patient's medical history in detail and updated the computerized patient record. History     Past Medical History:   Diagnosis Date    Diabetes ([address]     Dr Nisreen Dobbins GERD (gastroesophageal reflux disease)     Hyperlipidemia     Hypertension     Hypovitaminosis D     Nephrolithiasis 06/2016    Dr Stas Cabrera; right ureteral stone s/p EWSL    Obesity     W - 12/19    Seasonal allergic rhinitis     TIA (transient ischemic attack) 04/2018      Past Surgical History:   Procedure Laterality Date    CARDIAC SURG PROCEDURE UNLIST  01/2018    holter few pac/pvc, 4 beat run svt rate 156    CARDIAC SURG PROCEDURE UNLIST      thallium neg ef 70% (3/12);  Dr Blade Anderson; NST neg, ef 76%    ECHO 2D ADULT  11/2017    conc lvh, ef 60%, DD (3/12); nl lv, ef 68%, conc lvh, no wma, mild dd, no valvular abn, neg bubble study (11/17 - [address])    HX BREAST BIOPSY  2009    negative    HX COLONOSCOPY      Dr Cristi Lawson 12/13 neg    HX ORTHOPAEDIC      DEXA t score 1.10 spine, 1.10 hip (11/13)    HX UROLOGICAL  06/10/2016    RIGHT ESWL Dr. Carlos Flores  11/2017    mri showed mod periventricular wm changes    VASCULAR SURGERY PROCEDURE UNLIST  11/2017    BICA<50%, RIGHT vertebral art obst    VASCULAR SURGERY PROCEDURE UNLIST  12/2017    CTA head/neck showed no aneurysm, <50% right intracranial ICA, diminutive right vertebral art    VASCULAR SURGERY PROCEDURE UNLIST  04/2018    JUAN R 1.0, L 1.1    VASCULAR SURGERY PROCEDURE UNLIST  04/2018    MRA right vert artery not visualized due to occlusion/stenosis, left patent, prox PAIGE 50-60% stenosis    VASCULAR SURGERY PROCEDURE UNLIST  10/2019    CTA showed 98% PAIGE, 27% prox LICA, mild RSCA sten, mod LSCA stenosis; Dr Emilie Salcedo     Current Outpatient Medications   Medication Sig Dispense Refill    clopidogreL (PLAVIX) 75 mg tab take 1 tablet by mouth once daily 90 Tab 3    olmesartan (BENICAR) 40 mg tablet       exenatide microspheres (Bydureon) 2 mg/0.65 mL pnij 2 mg by SubCUTAneous route every seven (7) days.  fluticasone propionate (FLONASE) 50 mcg/actuation nasal spray 2 sprays each nostril daily 1 Bottle 12    atorvastatin (LIPITOR) 40 mg tablet take 1 tablet by mouth once daily 90 Tab 3    diclofenac (VOLTAREN) 1 % gel Apply 4 g to affected area four (4) times daily. Left knee and left leg 5 Each 5    NOVOLIN R REGULAR U-100 INSULN 100 unit/mL injection 7 Units by SubCUTAneous route.  0    NOVOLIN N NPH U-100 INSULIN 100 unit/mL injection 30 Units. 0    carvedilol (COREG) 25 mg tablet Take 1 Tab by mouth two (2) times daily (with meals). 290 Tab 3    folic acid/multivit-min/lutein (CENTRUM SILVER PO) Take 1 Tab by mouth daily.  metFORMIN (GLUCOPHAGE) 1,000 mg tablet Take 1,000 mg by mouth two (2) times daily (with meals).  amLODIPine (NORVASC) 10 mg tablet Take 10 mg by mouth daily.  Cholecalciferol, Vitamin D3, (VITAMIN D) 5,000 unit Tab Take 5,000 Units by mouth daily. Allergies   Allergen Reactions    Lopid [Gemfibrozil] Other (comments)     elev lfts    Penicillins Shortness of Breath and Nausea and Vomiting     Family History   Problem Relation Age of Onset    Diabetes Father     Hypertension Father     Cancer Sister     Hypertension Sister      Social History     Tobacco Use    Smoking status: Never Smoker    Smokeless tobacco: Never Used   Substance Use Topics    Alcohol use:  Yes     Alcohol/week: 2.0 standard drinks     Types: 1 Glasses of wine, 1 Cans of beer per week     Comment: social glass once a year     Diet, Lifestyle: generally follows a low fat low cholesterol diet, generally follows a low sodium diet, follows a diabetic diet regularly, exercises sporadically    Exercise level: not active    Depression Risk Screen     3 most recent PHQ Screens 5/15/2020   PHQ Not Done -   Little interest or pleasure in doing things Not at all   Feeling down, depressed, irritable, or hopeless Not at all   Total Score PHQ 2 0     SCREENINGS  Colonoscopy last done 12/13 Dr Brittany Leal last done 2020  DEXA last done 11/13  Dr Kulwant Jay last done >5 yrs    Immunization History   Administered Date(s) Administered    (RETIRED) Pneumococcal Vaccine (Unspecified Type) 02/29/2012    Influenza High Dose Vaccine PF 12/27/2016    Influenza Vaccine 11/24/2015, 11/03/2017    Influenza Vaccine (Tri) Adjuvanted 09/19/2019    Influenza Vaccine PF 12/18/2014    Influenza Vaccine Whole 10/01/2009, 12/07/2012    Influenza, Quadrivalent, Adjuvanted 09/14/2020    Pneumococcal Conjugate (PCV-13) 12/29/2015    Pneumococcal Polysaccharide (PPSV-23) 06/27/2017    TDAP Vaccine 02/29/2012    Zoster 06/21/2012     Alcohol Risk Screen   On any occasion during the past 3 months, have you had more than 3 drinks containing alcohol? No    Do you average more than 7 drinks per week? No    Functional Ability and Level of Safety     Hearing Loss   normal-to-mild    Vision - no acute complaints and is followed by Dr Raphael Padilla of Daily [facility]   Self-care     Fall Risk Screen     Fall Risk Assessment, last 12 mths 5/15/2020   Able to walk? Yes   Fall in past 12 months? No   Fall with injury? -   Number of falls in past 12 months -   Fall Risk Score -     Abuse Screen   Patient is not abused    Review of Systems   A comprehensive review of systems was negative except for that written in the HPI.     Physical Examination     Visit Vitals  /79   Pulse 70   Temp 96.8 °F (36 °C) (Temporal)   Resp 14   Ht 5' 3\" (1.6 m)   Wt 166 lb (75.3 kg)   SpO2 97%   BMI 29.41 kg/m²       Patient Care Team:  Luke Crow MD as PCP - General (Internal Medicine)  Luke Crow MD as PCP - [facility]  Valentino Hay MD (Ophthalmology)  Ruslan Spencer RN as [facility] (Internal Medicine)  ESTHER Marroquin, ANJUM Phelps (Physician Assistant)  Jossie Nobles MD (Vascular Surgery)     End-of-life planning  Advanced Directive discussed and documented: YES    Assessment/Plan   Education and counseling provided:  Are appropriate based on today's review and evaluation       ICD-10-CM ICD-9-CM    1. Medicare annual wellness visit, subsequent  Z00.00 V70.0    2. Needs flu shot  Z23 V04.81 FLU (FLUAD QUAD INFLUENZA VACCINE,QUAD,ADJUVANTED)   3. TIA (transient ischemic attack)  G45.9 435.9    4. Essential hypertension  I10 401.9    5. GERD without esophagitis  K21.9 530.81    6. Type 2 diabetes with nephropathy (HCC)  E11.21 250.40  DIABETES FOOT EXAM     583.81 CBC W/O DIFF      METABOLIC PANEL, COMPREHENSIVE      LIPID PANEL      HEMOGLOBIN A1C W/O EAG   7. Dyslipidemia  E78.5 272.4    8. Overweight with body mass index (BMI) of 25 to 25.9 in adult  E66.3 278.02     Z68.25 V85.21    9. Advanced directives, counseling/discussion  Z71.89 V65.49 ADVANCE CARE PLANNING FIRST 27 MINS   10. Screening for alcoholism  Z13.39 V79.1      current treatment plan is effective, no change in therapy  lab results and schedule of future lab studies reviewed with patient  reviewed diet, exercise and weight control  cardiovascular risk and specific lipid/LDL goals reviewed  use of aspirin to prevent MI and TIA's discussed. End of life discussion undertaken.   She will work on getting medical directive in place  Colonoscopy to be scheduled 2023  Mammo to be done 2021  DEXA to be scheduled per Dr Brigid Michael  shigrix advocated    Cognitive Screening   Has your family/caregiver stated any concerns about your memory: no

## 2020-09-16 NOTE — ACP (ADVANCE CARE PLANNING)
Advance Care Planning       Advance Care Planning (ACP) Physician/NP/PA (Provider) Conversation        Date of ACP Conversation: 9/14/2020    Conversation Conducted with:   Patient with Decision Making [address] Maker:    Current Designated Health Care Decision Maker:   (If there is a [address] named in the [address] [facility] [address]" box in the ACP activity, but it is not visible above, be sure to open that field and then select the health care decision maker relationship (ie \"primary\") in the blank space to the right of the name.)    Note: Assess and validate information in current ACP documents, as indicated. If no Authorized Decision Maker has previously been identified, then patient chooses [place]:  \"Who would you like to name as your primary health care decision-maker? \"    Name: Clement Jacobo   Relationship: daughter Phone number: yes  \"Can this person be reached easily? \" YES  \"Who would you like to name as your back-up decision maker? \"   Name: Sharifa Strange  Relationship: sister Phone number:   Steffany Res this person be reached easily? \" YES    Note: If the relationship of these Decision-Makers to the patient does NOT follow your state's Next of Kin hierarchy, recommend that patient complete ACP document that meets state-specific requirements to allow them to act on the patient's behalf when appropriate. Care Preferences:    Hospitalization: \"If your health worsens and it becomes clear that your chance of recovery is unlikely, what would your preference be regarding hospitalization? \"  If the patient would want hospitalization, answer \"yes\". If the patient would prefer comfort-focused treatment without hospitalization, answer \"no\". yes      Ventilation:   \"If you were in your present state of health and suddenly became very ill and were unable to breathe on your own, what would your preference be about the use of a ventilator (breathing machine) if it was available to you? \"    If patient would desire the use of a ventilator (breathing machine), answer \"yes\", if not answer \"no\":yes    \"If your health worsens and it becomes clear that your chance of recovery is unlikely, what would your preference be about the use of a ventilator (breathing machine) if it was available to you? \"   yes      Resuscitation:  \"CPR works best to restart the heart when there is a sudden event, like a heart attack, in someone who is otherwise healthy. Unfortunately, CPR does not typically restart the heart for people who have serious health conditions or who are very sick. \"    \"In the event your heart stopped as a result of an underlying serious health condition, would you want attempts to be made to restart your heart (answer \"yes\" for attempt to resuscitate) or would you prefer a natural death (answer \"no\" for do not attempt to resuscitate)? \"   yes    NOTE: If the patient has a valid advance directive AND provides care preference(s) that are inconsistent with that prior directive, advise the patient to consider either: creating a new advance directive that complies with state-specific requirements; or, if that is not possible, orally revoking that prior directive in accordance with state-specific requirements, which must be documented in the EHR.     Conversation Outcomes / Follow-Up Plan:   Recommended completion of Advance Directive      Length of Voluntary ACP Conversation in minutes:  16 minutes      Neeta Lr MD

## 2020-09-16 NOTE — PATIENT INSTRUCTIONS

## 2020-10-08 ENCOUNTER — OFFICE VISIT (OUTPATIENT)
Dept: ORTHOPEDIC SURGERY | Age: 70
End: 2020-10-08
Payer: MEDICARE

## 2020-10-08 VITALS
SYSTOLIC BLOOD PRESSURE: 156 MMHG | WEIGHT: 168.4 LBS | OXYGEN SATURATION: 98 % | DIASTOLIC BLOOD PRESSURE: 76 MMHG | HEART RATE: 66 BPM | RESPIRATION RATE: 16 BRPM | HEIGHT: 63 IN | TEMPERATURE: 97.8 F | BODY MASS INDEX: 29.84 KG/M2

## 2020-10-08 DIAGNOSIS — M48.062 SPINAL STENOSIS OF LUMBAR REGION WITH NEUROGENIC CLAUDICATION: ICD-10-CM

## 2020-10-08 DIAGNOSIS — M43.16 SPONDYLOLISTHESIS OF LUMBAR REGION: Primary | ICD-10-CM

## 2020-10-08 PROCEDURE — 3017F COLORECTAL CA SCREEN DOC REV: CPT | Performed by: PHYSICAL MEDICINE & REHABILITATION

## 2020-10-08 PROCEDURE — 1090F PRES/ABSN URINE INCON ASSESS: CPT | Performed by: PHYSICAL MEDICINE & REHABILITATION

## 2020-10-08 PROCEDURE — 1101F PT FALLS ASSESS-DOCD LE1/YR: CPT | Performed by: PHYSICAL MEDICINE & REHABILITATION

## 2020-10-08 PROCEDURE — G8417 CALC BMI ABV UP PARAM F/U: HCPCS | Performed by: PHYSICAL MEDICINE & REHABILITATION

## 2020-10-08 PROCEDURE — 99213 OFFICE O/P EST LOW 20 MIN: CPT | Performed by: PHYSICAL MEDICINE & REHABILITATION

## 2020-10-08 PROCEDURE — G8399 PT W/DXA RESULTS DOCUMENT: HCPCS | Performed by: PHYSICAL MEDICINE & REHABILITATION

## 2020-10-08 PROCEDURE — G8427 DOCREV CUR MEDS BY ELIG CLIN: HCPCS | Performed by: PHYSICAL MEDICINE & REHABILITATION

## 2020-10-08 PROCEDURE — G9899 SCRN MAM PERF RSLTS DOC: HCPCS | Performed by: PHYSICAL MEDICINE & REHABILITATION

## 2020-10-08 PROCEDURE — G8754 DIAS BP LESS 90: HCPCS | Performed by: PHYSICAL MEDICINE & REHABILITATION

## 2020-10-08 PROCEDURE — G8432 DEP SCR NOT DOC, RNG: HCPCS | Performed by: PHYSICAL MEDICINE & REHABILITATION

## 2020-10-08 PROCEDURE — G8536 NO DOC ELDER MAL SCRN: HCPCS | Performed by: PHYSICAL MEDICINE & REHABILITATION

## 2020-10-08 PROCEDURE — G8753 SYS BP > OR = 140: HCPCS | Performed by: PHYSICAL MEDICINE & REHABILITATION

## 2020-10-08 NOTE — PROGRESS NOTES
[address]  [address], [address]  [facility], [address]  Phone: [phone]  Fax: [phone]        Alanis Hurley  : 1950  PCP: Yulia Aparicio MD  10/8/2020    PROGRESS NOTE      HISTORY OF PRESENT ILLNESS  Jacquelyn Venson Aase is a 79 y.o. female who was seen as a new patient 2020 with c/o intermittent distal, medial LLE to the top of the foot (mid calf to foot) that was worse with prolonged walking (0.5-1 hr) about 4x per month. She found relief with sitting. She described it as an aching, numbness. She denied weakness or back pain. Pt noted that she has not been maintaining an HEP as she previously experienced vertigo while working out and it caused her to fall, so she has been hesitant to return. Note from Dr. Denise Rojas dated 10/7/19 indicating patient was seen with c/o intermittent left leg pain with numbness, aching, and throbbing radiating to her foot. She felt more pain when standing and walking and felt like she was dragging her left foot when she walked. She denied any back pain. She was prescribed compression stockings and Voltaren gel and referred here. Carmel Dawn comes in to the office today for f/u. Pt notes that she continues to have intermittent distal LLE pain when she walks excessively. She has to sit to relieve her pain because she feels her leg might give out on her. She notes that she has not tried the Voltaren gel. Pain Score: 0/10. PmHx: h/o mini strokes. She had one larger stroke in 2017 that resulted in left side weakness and speech difficulty. ASSESSMENT  This is a 79year-old female with intermittent distal LLE pain that is worse with walking and improves with sitting. Her symptoms may be due to a spondylolisthesis causing a lumbar spinal stenosis. We discussed options of: PT, MRI    PLAN  1.  At this time, Pt would like to observe her symptoms and make some notes in regards to her symptoms. The patient does not think her pain complaints are severe enough to warrant additional workup/treatment at this time. 2. I advised she maintain an HEP. 3. She can try OTC Voltaren gel. Pt will f/u in 6 months or sooner as needed. Diagnoses and all orders for this visit:    1. Spondylolisthesis of lumbar region    2. Spinal stenosis of lumbar region with neurogenic claudication         PAST MEDICAL HISTORY   Past Medical History:   Diagnosis Date    Diabetes ([facility] [address]     Dr Caro Rosenbaum GERD (gastroesophageal reflux disease)     Hyperlipidemia     Hypertension     Hypovitaminosis D     Nephrolithiasis 06/2016    Dr Trav Sutton; right ureteral stone s/p EWSL    Obesity     W - 12/19    Seasonal allergic rhinitis     TIA (transient ischemic attack) 04/2018       Past Surgical History:   Procedure Laterality Date    CARDIAC SURG PROCEDURE UNLIST  01/2018    holter few pac/pvc, 4 beat run svt rate 156    CARDIAC SURG PROCEDURE UNLIST      thallium neg ef 70% (3/12);  Dr Foster Mahajan; NST neg, ef 76%    ECHO 2D ADULT  11/2017    conc lvh, ef 60%, DD (3/12); nl lv, ef 68%, conc lvh, no wma, mild dd, no valvular abn, neg bubble study (11/17 - [address])    HX BREAST BIOPSY  2009    negative    HX COLONOSCOPY      Dr Jake Santiago 12/13 neg    HX ORTHOPAEDIC      DEXA t score 1.10 spine, 1.10 hip (11/13)    HX UROLOGICAL  06/10/2016    RIGHT ESWL Dr. Alexander Toscano  11/2017    mri showed mod periventricular wm changes    VASCULAR SURGERY PROCEDURE UNLIST  11/2017    BICA<50%, RIGHT vertebral art obst    VASCULAR SURGERY PROCEDURE UNLIST  12/2017    CTA head/neck showed no aneurysm, <50% right intracranial ICA, diminutive right vertebral art    VASCULAR SURGERY PROCEDURE UNLIST  04/2018    JUAN R 1.0, L 1.1    VASCULAR SURGERY PROCEDURE UNLIST  04/2018    MRA right vert artery not visualized due to occlusion/stenosis, left patent, prox PAIGE 50-60% stenosis    VASCULAR SURGERY PROCEDURE UNLIST  10/2019    CTA showed 23% PAIGE, 44% prox LICA, mild RSCA sten, mod LSCA stenosis; Dr Joli Nyhan   . MEDICATIONS    Current Outpatient Medications   Medication Sig Dispense Refill    clopidogreL (PLAVIX) 75 mg tab take 1 tablet by mouth once daily 90 Tab 3    olmesartan (BENICAR) 40 mg tablet       exenatide microspheres (Bydureon) 2 mg/0.65 mL pnij 2 mg by SubCUTAneous route every seven (7) days.  fluticasone propionate (FLONASE) 50 mcg/actuation nasal spray 2 sprays each nostril daily 1 Bottle 12    atorvastatin (LIPITOR) 40 mg tablet take 1 tablet by mouth once daily 90 Tab 3    diclofenac (VOLTAREN) 1 % gel Apply 4 g to affected area four (4) times daily. Left knee and left leg 5 Each 5    NOVOLIN R REGULAR U-100 INSULN 100 unit/mL injection 7 Units by SubCUTAneous route.  0    NOVOLIN N NPH U-100 INSULIN 100 unit/mL injection 30 Units. 0    carvedilol (COREG) 25 mg tablet Take 1 Tab by mouth two (2) times daily (with meals). 983 Tab 3    folic acid/multivit-min/lutein (CENTRUM SILVER PO) Take 1 Tab by mouth daily.  metFORMIN (GLUCOPHAGE) 1,000 mg tablet Take 1,000 mg by mouth two (2) times daily (with meals).  amLODIPine (NORVASC) 10 mg tablet Take 10 mg by mouth daily.  Cholecalciferol, Vitamin D3, (VITAMIN D) 5,000 unit Tab Take 5,000 Units by mouth daily. ALLERGIES  Allergies   Allergen Reactions    Lopid [Gemfibrozil] Other (comments)     elev lfts    Penicillins Shortness of Breath and Nausea and Vomiting          SOCIAL HISTORY    Social History     Socioeconomic History    Marital status:      Spouse name: Not on file    Number of children: Not on file    Years of education: Not on file    Highest education level: Not on file   Occupational History    Occupation: retired teacher [employer]   Tobacco Use    Smoking status: Never Smoker    Smokeless tobacco: Never Used   Substance and Sexual Activity    Alcohol use:  Yes Alcohol/week: 2.0 standard drinks     Types: 1 Glasses of wine, 1 Cans of beer per week     Comment: social glass once a year    Drug use: No       FAMILY HISTORY  Family History   Problem Relation Age of Onset    Diabetes Father     Hypertension Father     Cancer Sister     Hypertension Sister          REVIEW OF SYSTEMS  Review of Systems   Musculoskeletal:        Distal LLE pain (intermittent)          PHYSICAL EXAMINATION  Visit Vitals  BP (!) 156/76 (BP 1 Location: Right arm, BP Patient Position: Sitting)   Pulse 66   Temp 97.8 °F (36.6 °C)   Resp 16   Ht 5' 3\" (1.6 m)   Wt 168 lb 6.4 oz (76.4 kg)   SpO2 98%   BMI 29.83 kg/m²       Pain Assessment  10/8/2020   Location of Pain Back;Leg   Location Modifiers Left   Severity of Pain 0   Quality of Pain Other (Comment)   Quality of Pain Comment it just hurts   Duration of Pain Persistent   Frequency of Pain Constant   Aggravating Factors Walking;Standing   Limiting Behavior Some   Relieving Factors Rest   Relieving Factors Comment -   Result of Injury No           Constitutional:  Well developed, well nourished, in no acute distress. Psychiatric: Affect and mood are appropriate. Integumentary: No rashes or abrasions noted on exposed areas. SPINE/MUSCULOSKELETAL EXAM    Cervical spine:  Neck is midline. Normal muscle tone. No focal atrophy is noted. ROM pain free. Shoulder ROM intact. No tenderness to palpation. Negative Spurling's sign. Negative Tinel's sign. Negative Babb's sign. Sensation in the bilateral arms grossly intact to light touch.      Thoracic spine:  Thoracic kyphosis      Lumbar spine:  No rash, ecchymosis, or gross obliquity. No fasciculations. No focal atrophy is noted. No pain with hip ROM. Full range of motion. No tenderness to palpation.   No tenderness to palpation at the sciatic notch.   SI joints non-tender. Trochanters non tender. Sensation in the bilateral legs grossly intact to light touch.     Negative SLR.       MOTOR:      Biceps  Triceps Deltoids Wrist Ext Wrist Flex Hand Intrin   Right 5/5 5/5 5/5 5/5 5/5 5/5   Left 5/5 5/5 5/5 5/5 5/5 5/5             Hip Flex  Quads Hamstrings Ankle DF EHL Ankle PF   Right 5/5 5/5 5/5 5/5 5/5 5/5   Left 5/5 5/5 5/5 5/5 5/5 5/5     DTRs are 2+ biceps, triceps, brachioradialis, patella, and Achilles.     Squat not tested. No difficulty with tandem gait.      Ambulation without assistive device. FWB.       RADIOGRAPHS  2V Lumbar XR images taken on 1/13/19 personally reviewed with patient:  Facet sclerosis. Lean to the left. Anterolisthesis of L4 on L5. Disc space narrowing at L4-5. Atherosclerosis.    10 minutes of face-to-face contact were spent with the patient during today's visit extensively discussing symptoms and treatment plan. All questions were answered. More than half of this visit today was spent on counseling.      Written by Melo Orozco as dictated by Heather Drake MD

## 2020-10-08 NOTE — PATIENT INSTRUCTIONS
Lumbar Spinal Stenosis: Care Instructions  Your Care Instructions     Stenosis in the spine is a narrowing of the canal that is around the spinal cord and nerve roots in your back. It can happen as part of aging. Sometimes bone and other tissue grow into this canal and press on the nerves that branch out from the spinal cord. This can cause pain, numbness, and weakness. When it happens in the lower part of your back, it is called lumbar spinal stenosis. It can cause problems in the legs, feet, and rear end (buttocks). You may be able to get relief from the symptoms of spinal stenosis by taking pain medicine. Your doctor may suggest physical therapy and exercises to keep your spine strong and flexible. Some people try steroid shots to reduce swelling. If pain and numbness in your legs are still so bad that you cannot do your normal activities, you may need surgery. Follow-up care is a key part of your treatment and safety. Be sure to make and go to all appointments, and call your doctor if you are having problems. It's also a good idea to know your test results and keep a list of the medicines you take. How can you care for yourself at home? · Take an over-the-counter pain medicine. Nonsteroidal anti-inflammatory drugs (NSAIDs) such as ibuprofen or naproxen seem to work best. But if you can't take NSAIDs, you can try acetaminophen. Be safe with medicines. Read and follow all instructions on the label. · Do not take two or more pain medicines at the same time unless the doctor told you to. Many pain medicines have acetaminophen, which is Tylenol. Too much acetaminophen (Tylenol) can be harmful. · Stay at a healthy weight. Being overweight puts extra strain on your spine. · Change positions often when you sit or stand. This can ease pain. It may also reduce pressure on the spinal cord and its nerves. · Avoid doing things that make your symptoms worse.  Walking downhill and standing for a long time may cause pain.  · Stretch and strengthen your back muscles as your doctor or physical therapist recommends. If your doctor says it is okay to do them, these exercises may help. ? Lie on your back with your knees bent. Gently pull one bent knee to your chest. Put that foot back on the floor, and then pull the other knee to your chest.  ? Do pelvic tilts. Lie on your back with your knees bent. Tighten your stomach muscles. Pull your belly button (navel) in and up toward your ribs. You should feel like your back is pressing to the floor and your hips and pelvis are slightly lifting off the floor. Hold for 6 seconds while breathing smoothly. ? Stand with your back flat against a wall. Slowly slide down until your knees are slightly bent. Hold for 10 seconds, then slide back up the wall. · Remove or change anything in your house that may cause you to fall. Keep walkways clear of clutter, electrical cords, and throw rugs. When should you call for help? Call 911 anytime you think you may need emergency care. For example, call if:    · You are unable to move a leg at all. Call your doctor now or seek immediate medical care if:    · You have new or worse symptoms in your legs, belly, or buttocks. Symptoms may include:  ? Numbness or tingling. ? Weakness. ? Pain.     · You lose bladder or bowel control. Watch closely for changes in your health, and be sure to contact your doctor if:    · You have a fever, lose weight, or don't feel well.     · You are not getting better as expected. Where can you learn more? Go to http://www.gray.com/  Enter X327 in the search box to learn more about \"Lumbar Spinal Stenosis: Care Instructions. \"  Current as of: March 2, 2020               Content Version: 12.6  © 8415-2966 [employer]. Care instructions adapted under license by [employer] (which disclaims liability or warranty for this information).  If you have questions about a medical condition or this instruction, always ask your healthcare professional. [place] [address] any warranty or liability for your use of this information.

## 2020-11-16 DIAGNOSIS — G45.9 TIA (TRANSIENT ISCHEMIC ATTACK): ICD-10-CM

## 2020-11-16 DIAGNOSIS — I10 ESSENTIAL HYPERTENSION: Primary | ICD-10-CM

## 2020-11-16 NOTE — PROGRESS NOTES
Order for carotid bilateral placed per verbal order from Dr. Ajay Pena placed due to discontinued order.

## 2020-11-23 DIAGNOSIS — I10 ESSENTIAL HYPERTENSION: ICD-10-CM

## 2020-11-23 RX ORDER — CARVEDILOL 25 MG/1
TABLET ORAL
Qty: 180 TAB | Refills: 3 | Status: SHIPPED | OUTPATIENT
Start: 2020-11-23 | End: 2021-12-22

## 2020-11-24 ENCOUNTER — OFFICE VISIT (OUTPATIENT)
Dept: VASCULAR SURGERY | Age: 70
End: 2020-11-24
Payer: MEDICARE

## 2020-11-24 VITALS
WEIGHT: 168 LBS | HEART RATE: 69 BPM | BODY MASS INDEX: 29.77 KG/M2 | HEIGHT: 63 IN | DIASTOLIC BLOOD PRESSURE: 74 MMHG | RESPIRATION RATE: 15 BRPM | SYSTOLIC BLOOD PRESSURE: 132 MMHG | OXYGEN SATURATION: 99 %

## 2020-11-24 DIAGNOSIS — G45.9 TIA (TRANSIENT ISCHEMIC ATTACK): Primary | ICD-10-CM

## 2020-11-24 DIAGNOSIS — I63.019 CEREBROVASCULAR ACCIDENT (CVA) DUE TO THROMBOSIS OF VERTEBRAL ARTERY, UNSPECIFIED BLOOD VESSEL LATERALITY (HCC): ICD-10-CM

## 2020-11-24 DIAGNOSIS — I65.23 BILATERAL CAROTID ARTERY STENOSIS: ICD-10-CM

## 2020-11-24 PROCEDURE — G8432 DEP SCR NOT DOC, RNG: HCPCS | Performed by: PHYSICIAN ASSISTANT

## 2020-11-24 PROCEDURE — 3017F COLORECTAL CA SCREEN DOC REV: CPT | Performed by: PHYSICIAN ASSISTANT

## 2020-11-24 PROCEDURE — 1101F PT FALLS ASSESS-DOCD LE1/YR: CPT | Performed by: PHYSICIAN ASSISTANT

## 2020-11-24 PROCEDURE — G8752 SYS BP LESS 140: HCPCS | Performed by: PHYSICIAN ASSISTANT

## 2020-11-24 PROCEDURE — G9899 SCRN MAM PERF RSLTS DOC: HCPCS | Performed by: PHYSICIAN ASSISTANT

## 2020-11-24 PROCEDURE — G8417 CALC BMI ABV UP PARAM F/U: HCPCS | Performed by: PHYSICIAN ASSISTANT

## 2020-11-24 PROCEDURE — G8399 PT W/DXA RESULTS DOCUMENT: HCPCS | Performed by: PHYSICIAN ASSISTANT

## 2020-11-24 PROCEDURE — G8536 NO DOC ELDER MAL SCRN: HCPCS | Performed by: PHYSICIAN ASSISTANT

## 2020-11-24 PROCEDURE — 1090F PRES/ABSN URINE INCON ASSESS: CPT | Performed by: PHYSICIAN ASSISTANT

## 2020-11-24 PROCEDURE — G8428 CUR MEDS NOT DOCUMENT: HCPCS | Performed by: PHYSICIAN ASSISTANT

## 2020-11-24 PROCEDURE — 99213 OFFICE O/P EST LOW 20 MIN: CPT | Performed by: PHYSICIAN ASSISTANT

## 2020-11-24 PROCEDURE — G8754 DIAS BP LESS 90: HCPCS | Performed by: PHYSICIAN ASSISTANT

## 2020-11-24 NOTE — PROGRESS NOTES
1. Have you been to an emergency room or urgent care clinic since your last visit?   no  Hospitalized since your last visit? If yes, where, when, and reason for visit?   no  2. Have you seen or consulted any other health care providers outside of the [facility] since your last visit including any procedures, health maintenance items.  If yes, where, when and reason for visit?   no

## 2020-11-24 NOTE — PROGRESS NOTES
[address] [facility]    Chief Complaint   Patient presents with    Carotid Artery Stenosis       History and Physical    Ms Elida Runner was referred by PCP about a year ago after they saw her in follow up      She was admitted to [facility] early 2018 after symptoms described and documented as TIA  It was noted by PCP she underwent angiogram by Dr Emily Duarte and he was recommending PAIGE procedure but then he retired and pt never f/u with the physician that took over the practice Dr Loli Bess. She has not had f/u imaging since.  No new neurologic complaints     What I can gather is an MRA early 2018: 1. High-grade stenosis/occlusion of the proximal right vertebral artery with  segmental reconstitution of short segments of the more distal right vertebral  artery. Left vertebral artery patent. 2. Right internal carotid artery 50-6% segmental stenosis proximally. 3. Less than 10% stenosis left carotid artery bifurcation.     It does appear an angio was done prior to consideration of a carotid endarterectomy.  It revealed only a 60% stenosis so the CEA was not advised and instead agreed on best medical therapy including DAPT - however, there was then a cardiology evaluation for clearance for the possible CEA and they had suggested just monotherapy so she has actually only been taking plavix, no aspirin     She has had no other major neuro events  She still gets some generalized intermittent weakness in her extremities but has spinal disease  We did a repeat study in May and then one arranged for this time frame and if stable can transition back to yearly surveillance - see results/discussion below    Past Medical History:   Diagnosis Date    Diabetes ([facility] [address]     Dr Andres Montelongo GERD (gastroesophageal reflux disease)     Hyperlipidemia     Hypertension     Hypovitaminosis D     Nephrolithiasis 06/2016    Dr Guillermina Bloom; right ureteral stone s/p EWSL    Obesity     W - 12/19    Seasonal allergic rhinitis     TIA (transient ischemic attack) 04/2018     Patient Active Problem List   Diagnosis Code    Hypovitaminosis D E55.9    Dyslipidemia E78.5    Essential hypertension I10    GERD without esophagitis K21.9    Advance directive discussed with patient Z70.80    Nephrolithiasis s/p EWSL Dr Samia Juarez 6/16 N20.0    TIA (transient ischemic attack) G45.9    Symptomatic carotid artery stenosis I65.29    Type 2 diabetes with nephropathy ([facility] [address] E11.21    Overweight with body mass index (BMI) of 25 to 25.9 in adult E66.3, Z68.25     Past Surgical History:   Procedure Laterality Date    CARDIAC SURG PROCEDURE UNLIST  01/2018    holter few pac/pvc, 4 beat run svt rate 156    CARDIAC SURG PROCEDURE UNLIST      thallium neg ef 70% (3/12);  Dr Zackary Givens; NST neg, ef 76%    ECHO 2D ADULT  11/2017    conc lvh, ef 60%, DD (3/12); nl lv, ef 68%, conc lvh, no wma, mild dd, no valvular abn, neg bubble study (11/17 - [address])    HX BREAST BIOPSY  2009    negative    HX COLONOSCOPY      Dr Lawson Roche 12/13 neg    HX ORTHOPAEDIC      DEXA t score 1.10 spine, 1.10 hip (11/13)    HX UROLOGICAL  06/10/2016    RIGHT ESWL Dr. Ricardo Valenzuela  11/2017    mri showed mod periventricular wm changes    VASCULAR SURGERY PROCEDURE UNLIST  11/2017    BICA<50%, RIGHT vertebral art obst    VASCULAR SURGERY PROCEDURE UNLIST  12/2017    CTA head/neck showed no aneurysm, <50% right intracranial ICA, diminutive right vertebral art    VASCULAR SURGERY PROCEDURE UNLIST  04/2018    JUAN R 1.0, L 1.1    VASCULAR SURGERY PROCEDURE UNLIST  04/2018    MRA right vert artery not visualized due to occlusion/stenosis, left patent, prox PAIGE 50-60% stenosis    VASCULAR SURGERY PROCEDURE UNLIST  10/2019    CTA showed 51% PAIGE, 40% prox LICA, mild RSCA sten, mod LSCA stenosis; Dr Tawanna Cruz     Current Outpatient Medications   Medication Sig Dispense Refill    carvediloL (COREG) 25 mg tablet take 1 tablet by mouth twice a day with meals 180 Tab 3    clopidogreL (PLAVIX) 75 mg tab take 1 tablet by mouth once daily 90 Tab 3    olmesartan (BENICAR) 40 mg tablet       exenatide microspheres (Bydureon) 2 mg/0.65 mL pnij 2 mg by SubCUTAneous route every seven (7) days.  fluticasone propionate (FLONASE) 50 mcg/actuation nasal spray 2 sprays each nostril daily 1 Bottle 12    atorvastatin (LIPITOR) 40 mg tablet take 1 tablet by mouth once daily 90 Tab 3    diclofenac (VOLTAREN) 1 % gel Apply 4 g to affected area four (4) times daily. Left knee and left leg 5 Each 5    NOVOLIN R REGULAR U-100 INSULN 100 unit/mL injection 7 Units by SubCUTAneous route.  0    NOVOLIN N NPH U-100 INSULIN 100 unit/mL injection 30 Units. 0    folic acid/multivit-min/lutein (CENTRUM SILVER PO) Take 1 Tab by mouth daily.  metFORMIN (GLUCOPHAGE) 1,000 mg tablet Take 1,000 mg by mouth two (2) times daily (with meals).  amLODIPine (NORVASC) 10 mg tablet Take 10 mg by mouth daily.  Cholecalciferol, Vitamin D3, (VITAMIN D) 5,000 unit Tab Take 5,000 Units by mouth daily. Allergies   Allergen Reactions    Lopid [Gemfibrozil] Other (comments)     elev lfts    Penicillins Shortness of Breath and Nausea and Vomiting       Physical   Visit Vitals  /74 (BP 1 Location: Left arm, BP Patient Position: Sitting)   Pulse 69   Resp 15   Ht 5' 3\" (1.6 m)   Wt 168 lb (76.2 kg)   SpO2 99%   BMI 29.76 kg/m²     General:  Alert, cooperative, no distress. Wearing a mask   Head:  Normocephalic, without obvious abnormality, atraumatic. Eyes:    Conjunctivae/corneas clear. Pupils equal, round, reactive to light. Extraocular movements intact. Neck:         No jvd   Lungs:   No increased respiratory effort   Extremities: Extremities normal, atraumatic, no cyanosis or edema. Pulses: 2+ and symmetric all extremities.    Skin: Skin color, texture, turgor normal. No rashes or lesions.         Neurologic: No obvious neuro deficits         Vascular studies:  Right Carotid     The right CCA is patent. There is mild stenosis in the right ICA (<50%). The right ICA has calcific and diffuse plaque. The right ECA is patent. The right vertebral is antegrade. The right subclavian is normal with triphasic waveforms. Left Carotid     The left CCA is patent. There is mild stenosis in the left ICA (<50%). The left ICA has calcific and diffuse plaque. The left ECA is patent. The left vertebral is antegrade. The left subclavian is normal with triphasic waveforms. Left ICA upper limits of mild (<50%) range. Left vertebral artery patent with abnormal waveforms         Impression/Plan:     ICD-10-CM ICD-9-CM    1. TIA (transient ischemic attack)  G45.9 435.9    2. Bilateral carotid artery stenosis  I65.23 433.10 DUPLEX CAROTID BILATERAL     433.30    3. Cerebrovascular accident (CVA) due to thrombosis of vertebral artery, unspecified blood vessel laterality ([facility] [address]  I63.019 433.21      No orders of the defined types were placed in this encounter. Based on prior discussion and then stablility continue best medical therapy and can now do yearly surveillance       ANJUM Coreas    Portions of this note have been entered using voice recognition software.

## 2021-03-10 DIAGNOSIS — E78.5 DYSLIPIDEMIA: ICD-10-CM

## 2021-03-11 RX ORDER — ATORVASTATIN CALCIUM 40 MG/1
TABLET, FILM COATED ORAL
Qty: 90 TAB | Refills: 3 | Status: SHIPPED | OUTPATIENT
Start: 2021-03-11 | End: 2022-05-01

## 2021-03-16 ENCOUNTER — APPOINTMENT (OUTPATIENT)
Dept: INTERNAL MEDICINE CLINIC | Age: 71
End: 2021-03-16

## 2021-03-16 ENCOUNTER — HOSPITAL ENCOUNTER (OUTPATIENT)
Dept: LAB | Age: 71
Discharge: HOME | End: 2021-03-16
Payer: MEDICARE

## 2021-03-16 DIAGNOSIS — E11.21 TYPE 2 DIABETES WITH NEPHROPATHY (HCC): ICD-10-CM

## 2021-03-16 LAB
ALBUMIN SERPL-MCNC: 3.5 G/DL (ref 3.4–5)
ALBUMIN/GLOB SERPL: 1 {RATIO} (ref 0.8–1.7)
ALP SERPL-CCNC: 127 U/L (ref 45–117)
ALT SERPL-CCNC: 16 U/L (ref 13–56)
ANION GAP SERPL CALC-SCNC: 6 MMOL/L (ref 3–18)
AST SERPL-CCNC: 11 U/L (ref 10–38)
BILIRUB SERPL-MCNC: 0.4 MG/DL (ref 0.2–1)
BUN SERPL-MCNC: 13 MG/DL (ref 7–18)
BUN/CREAT SERPL: 13 (ref 12–20)
CALCIUM SERPL-MCNC: 8.9 MG/DL (ref 8.5–10.1)
CHLORIDE SERPL-SCNC: 106 MMOL/L (ref 100–111)
CHOLEST SERPL-MCNC: 151 MG/DL
CO2 SERPL-SCNC: 29 MMOL/L (ref 21–32)
CREAT SERPL-MCNC: 0.98 MG/DL (ref 0.6–1.3)
ERYTHROCYTE [DISTWIDTH] IN BLOOD BY AUTOMATED COUNT: 13.9 % (ref 11.6–14.5)
GLOBULIN SER CALC-MCNC: 3.5 G/DL (ref 2–4)
GLUCOSE SERPL-MCNC: 169 MG/DL (ref 74–99)
HBA1C MFR BLD: 8.1 % (ref 4.2–5.6)
HCT VFR BLD AUTO: 42.2 % (ref 35–45)
HDLC SERPL-MCNC: 35 MG/DL (ref 40–60)
HDLC SERPL: 4.3 {RATIO} (ref 0–5)
HGB BLD-MCNC: 13.6 G/DL (ref 12–16)
LDLC SERPL CALC-MCNC: 91 MG/DL (ref 0–100)
LIPID PROFILE,FLP: ABNORMAL
MCH RBC QN AUTO: 27 PG (ref 24–34)
MCHC RBC AUTO-ENTMCNC: 32.2 G/DL (ref 31–37)
MCV RBC AUTO: 83.7 FL (ref 74–97)
PLATELET # BLD AUTO: 300 K/UL (ref 135–420)
PMV BLD AUTO: 12 FL (ref 9.2–11.8)
POTASSIUM SERPL-SCNC: 4.1 MMOL/L (ref 3.5–5.5)
PROT SERPL-MCNC: 7 G/DL (ref 6.4–8.2)
RBC # BLD AUTO: 5.04 M/UL (ref 4.2–5.3)
SODIUM SERPL-SCNC: 141 MMOL/L (ref 136–145)
TRIGL SERPL-MCNC: 125 MG/DL (ref ?–150)
VLDLC SERPL CALC-MCNC: 25 MG/DL
WBC # BLD AUTO: 7.4 K/UL (ref 4.6–13.2)

## 2021-03-16 PROCEDURE — 80053 COMPREHEN METABOLIC PANEL: CPT

## 2021-03-16 PROCEDURE — 85027 COMPLETE CBC AUTOMATED: CPT

## 2021-03-16 PROCEDURE — 83036 HEMOGLOBIN GLYCOSYLATED A1C: CPT

## 2021-03-16 PROCEDURE — 80061 LIPID PANEL: CPT

## 2021-03-18 NOTE — PROGRESS NOTES
79 y.o. BLACK/ female who presents for evaluation. She has f/u with Dr Richi Sheth on 5/3/21. Denies polyuria, polydipsia, nocturia, vision change. Admits that the diet is off, portions are off, not exercising much either because of the pandemic and weather issues. Weight is stable though    Reports no cardiovascular complaints. bp running in the 120-130s over 70s reliably on current regimen. Not exercising much as above    She is upset that Dr. Liliya Thornton is not [address] anymore but I assured her that she can still see him    No GI or Gu complaints. LAST MEDICARE WELLNESS EXAM: 12/29/15, 6/27/17, 7/16/18, 9/19/19, 9/14/20    Past Medical History:   Diagnosis Date    Diabetes ([facility] [place] [address]     Dr Jakcson Spotted GERD (gastroesophageal reflux disease)     Hyperlipidemia     Hypertension     Hypovitaminosis D     Nephrolithiasis 06/2016    Dr Lazaro Watson; right ureteral stone s/p EWSL    Obesity     W - 12/19    Seasonal allergic rhinitis     TIA (transient ischemic attack) 04/2018     Past Surgical History:   Procedure Laterality Date    ECHO 2D ADULT  11/2017    conc lvh, ef 60%, DD (3/12); nl lv, ef 68%, conc lvh, no wma, mild dd, no valvular abn, neg bubble study (11/17 - [address])    HX BREAST BIOPSY  2009    negative    HX COLONOSCOPY      Dr Lizbeth Quiroga 12/13 neg    HX ORTHOPAEDIC      DEXA t score 1.10 spine, 1.10 hip (11/13)    HX UROLOGICAL  06/10/2016    RIGHT ESWL Dr. Michael Mclaughlin [place]  11/2017    mri showed mod periventricular wm changes    [place] CARDIAC SURG PROCEDURE UNLIST  01/2018    holter few pac/pvc, 4 beat run svt rate 156    [place] CARDIAC SURG PROCEDURE UNLIST      thallium neg ef 70% (3/12);  Dr Jimbo Ortega; NST neg, ef 76%    VASCULAR SURGERY PROCEDURE UNLIST  11/2017    BICA<50%, RIGHT vertebral art obst    VASCULAR SURGERY PROCEDURE UNLIST  12/2017    CTA head/neck showed no aneurysm, <50% right intracranial ICA, diminutive right vertebral art    VASCULAR SURGERY PROCEDURE UNLIST  04/2018    JUAN R 1.0, L 1.1    VASCULAR SURGERY PROCEDURE UNLIST  04/2018    MRA right vert artery not visualized due to occlusion/stenosis, left patent, prox PAIGE 50-60% stenosis    VASCULAR SURGERY PROCEDURE UNLIST  10/2019    CTA showed 63% PAIGE, 70% prox LICA, mild RSCA sten, mod LSCA stenosis; Dr Angi Weiner History     Socioeconomic History    Marital status:      Spouse name: Not on file    Number of children: Not on file    Years of education: Not on file    Highest education level: Not on file   Occupational History    Occupation: retired teacher [place]   Social Needs    Financial resource strain: Not on file    Food insecurity     Worry: Not on file     Inability: Not on file   [employer] needs     Medical: Not on file     Non-medical: Not on file   Tobacco Use    Smoking status: Never Smoker    Smokeless tobacco: Never Used   Substance and Sexual Activity    Alcohol use:  Yes     Alcohol/week: 2.0 standard drinks     Types: 1 Glasses of wine, 1 Cans of beer per week     Comment: social glass once a year    Drug use: No    Sexual activity: Not on file   Lifestyle    Physical activity     Days per week: Not on file     Minutes per session: Not on file    Stress: Not on file   Relationships    Social connections     Talks on phone: Not on file     Gets together: Not on file     Attends Mu-ism service: Not on file     Active member of club or organization: Not on file     Attends meetings of clubs or organizations: Not on file     Relationship status: Not on file    Intimate partner violence     Fear of current or ex partner: Not on file     Emotionally abused: Not on file     Physically abused: Not on file     Forced sexual activity: Not on file   Other Topics Concern    Not on file   Social History Narrative    Not on file     Allergies   Allergen Reactions    Lopid [Gemfibrozil] Other (comments)     elev lfts    Penicillins Shortness of Breath and Nausea and Vomiting       Current Outpatient Medications   Medication Sig    atorvastatin (LIPITOR) 40 mg tablet take 1 tablet by mouth once daily    carvediloL (COREG) 25 mg tablet take 1 tablet by mouth twice a day with meals    clopidogreL (PLAVIX) 75 mg tab take 1 tablet by mouth once daily    olmesartan (BENICAR) 40 mg tablet     exenatide microspheres (Bydureon) 2 mg/0.65 mL pnij 2 mg by SubCUTAneous route every seven (7) days.  fluticasone propionate (FLONASE) 50 mcg/actuation nasal spray 2 sprays each nostril daily    diclofenac (VOLTAREN) 1 % gel Apply 4 g to affected area four (4) times daily. Left knee and left leg    NOVOLIN R REGULAR U-100 INSULN 100 unit/mL injection 7 Units by SubCUTAneous route.  NOVOLIN N NPH U-100 INSULIN 100 unit/mL injection 30 Units.  folic acid/multivit-min/lutein (CENTRUM SILVER PO) Take 1 Tab by mouth daily.  metFORMIN (GLUCOPHAGE) 1,000 mg tablet Take 1,000 mg by mouth two (2) times daily (with meals).  amLODIPine (NORVASC) 10 mg tablet Take 10 mg by mouth daily.  Cholecalciferol, Vitamin D3, (VITAMIN D) 5,000 unit Tab Take 5,000 Units by mouth daily. No current facility-administered medications for this visit.       REVIEW OF SYSTEMS: gyn>5 yrs, mammo 9/19, DEXA 11/13, sees Dr Claude Palacios, no podiatry, colo 12/13 Dr Tor Gan  Ophtho - no vision change or eye pain  Oral - no mouth pain, tongue or tooth problems  Ears - no hearing loss, ear pain, fullness, no swallowing problems  Cardiac - no CP, PND, orthopnea, edema, palpitations or syncope  Chest - no breast masses  Resp - no wheezing, chronic coughing, dyspnea  GI - no heartburn, nausea, vomiting, change in bowel habits, bleeding, hemorrhoids  Urinary - no dysuria, hematuria, flank pain, urgency, frequency  Genitals - no genital lesions, discharge, masses, ulceration, warts  Ortho - no swelling, dec ROM, myalgias    Visit Vitals  /80   Pulse 72   Temp 97.2 °F (36.2 °C) (Temporal)   Resp 14   Ht 5' 3\" (1.6 m)   Wt 166 lb (75.3 kg)   SpO2 100%   BMI 29.41 kg/m²     A&O x3  Affect is appropriate. Mood stable  No apparent distress  Anicteric, no JVD, adenopathy or thyromegaly. No carotid bruits or radiated murmur  Lungs clear to auscultation, no wheezes or rales  Heart showed regular rate and rhythm. No murmur, rubs, gallops  Abdomen soft nontender, no hepatosplenomegaly or masses.    Ext without c/c/e    LABS  From 2/12 showed   gluc 330, cr 0.93, gfr 77,  alt 30, hba1c 11.2,                   chol 199, tg 366, hdl 35, ldl-c 67,   wbc 7.1, hb 15.2, plt 273,  tsh 2.20  From 6/12 showed                   hba1c 8.2,   ldl-p 1018, chol 112, tg 95,   hdl 32, ldl-c 61,   umar 1.5  From 12/12 showed                   hba1c 8.9,   ldl-p 1486, chol 132, tg 109, hdl 37, ldl-c 73  From 6/13 showed   gluc 128, cr 0.91, gfr 68,  alt 13, hba1c 7.8,                  chol 115, tg 157, hdl 28, ldl-c 56  From 12/13 showed                   hba1c 7.5,                  wbc 7.5, hb 13.2, plt 297,  vit d 69.2  From 6/14 showed   gluc 171, cr 0.85, gfr>60, alt<5,        chol 134, tg 165, hdl 26, ldl-c 73  From 12/14 showed        hba1c 9.2,   umar 4       chol 151, tg 256, hdl 33, ldl-c 67,     From 3/15 showed       hba1c 7.3,   umar neg           ldl-c 82,   From 7/15 showed   gluc 130, cr 1.16, gfr 49,  alt 15, hba1c 7.5,       chol 128, tg 271, hdl 29, ldl-c 45  From 12/15 showed        hba1c 9.6,   umar 7.0    chol 160, tg 218, hdl 33, ldl-c 83,     From 2/16 showed   gluc 117, cr 1.15, gfr 58,  alt 18, hba1c 7.9,   umar 4.7    chol 112, tg 184, hdl 30, ldl-c 45,   ,                       vit d 45.3, uric 7.2  From 6/16 showed   gluc 98      hba1c 7.7,       chol 143, tg 178, hdl 31, ldl-c 76  From 12/16 showed gluc 170, cr 1.11, gfr 60,    hba1c 8.9,   umar 4.0  From 6/17 showed   gluc 148, cr 1.06, gfr>60,    hba1c 8.1,       chol 192, tg 202, hdl 39, ldl-c 113, wbc 6.3, hb 13.8, plt 281  From 1/18 showed   gluc 154, cr 1.08, gfr>60, alt 11, hba1c 8.0,   umar 16.0,                              vit d 40.7  From 4/18 showed   gluc 140, cr 0.90, gfr>60,    hba1c 8.5,       chol 216, tg 356, hdl 31, ldl-c na    wbc 7.0, hb 13.1, plt 244, ck/trop neg  From 5/18 showed        hba1c 8.1  From 4/19 showed   gluc 237, cr 0.98, gfr 69,  alt 14,        chol 121, tg 135, hdl 29, ldl-c 65  From 9/19 showed   gluc 218, cr 1.11, gfr 59,  alt 20, hba1c 10.8, umar 17    chol 110, tg 127, hdl 30, ldl-c 55,   wbc 6.9, hb 13.5, plt 260  From 3/20 showed   gluc 170, cr 1.04, gfr>60, alt 18, hba1c 7.7,       chol 121, tg 94,   hdl 36, ldl-c 66,  wbc 6.0, hb 13.6, plt 288  From 9/20 showed        hba1c 7.5,  umar 16,             vit d 69.3    Results for orders placed or performed during the hospital encounter of 03/16/21   CBC W/O DIFF   Result Value Ref Range    WBC 7.4 4.6 - 13.2 K/uL    RBC 5.04 4.20 - 5.30 M/uL    HGB 13.6 12.0 - 16.0 g/dL    HCT 42.2 35.0 - 45.0 %    MCV 83.7 74.0 - 97.0 FL    MCH 27.0 24.0 - 34.0 PG    MCHC 32.2 31.0 - 37.0 g/dL    RDW 13.9 11.6 - 14.5 %    PLATELET 865 080 - 234 K/uL    MPV 12.0 (H) 9.2 - 06.1 FL   METABOLIC PANEL, COMPREHENSIVE   Result Value Ref Range    Sodium 141 136 - 145 mmol/L    Potassium 4.1 3.5 - 5.5 mmol/L    Chloride 106 100 - 111 mmol/L    CO2 29 21 - 32 mmol/L    Anion gap 6 3.0 - 18 mmol/L    Glucose 169 (H) 74 - 99 mg/dL    BUN 13 7.0 - 18 MG/DL    Creatinine 0.98 0.6 - 1.3 MG/DL    BUN/Creatinine ratio 13 12 - 20      GFR est AA >60 >60 ml/min/1.73m2    GFR est non-AA 56 (L) >60 ml/min/1.73m2    Calcium 8.9 8.5 - 10.1 MG/DL    Bilirubin, total 0.4 0.2 - 1.0 MG/DL    ALT (SGPT) 16 13 - 56 U/L    AST (SGOT) 11 10 - 38 U/L    Alk.  phosphatase 127 (H) 45 - 117 U/L    Protein, total 7.0 6.4 - 8.2 g/dL    Albumin 3.5 3.4 - 5.0 g/dL    Globulin 3.5 2.0 - 4.0 g/dL    A-G Ratio 1.0 0.8 - 1.7     LIPID PANEL   Result Value Ref Range    LIPID PROFILE Cholesterol, total 151 <200 MG/DL    Triglyceride 125 <150 MG/DL    HDL Cholesterol 35 (L) 40 - 60 MG/DL    LDL, calculated 91 0 - 100 MG/DL    VLDL, calculated 25 MG/DL    CHOL/HDL Ratio 4.3 0 - 5.0     HEMOGLOBIN A1C W/O EAG   Result Value Ref Range    Hemoglobin A1c 8.1 (H) 4.2 - 5.6 %     We reviewed the patient's labs from the last several visits to point out trends in the numbers          Patient Active Problem List   Diagnosis Code    Hypovitaminosis D E55.9    Dyslipidemia E78.5    Essential hypertension I10    GERD without esophagitis K21.9    Advance directive discussed with patient Z70.80    Nephrolithiasis s/p EWSL Dr Marylou Keller 6/16 N20.0    TIA (transient ischemic attack) G45.9    Symptomatic carotid artery stenosis I65.29    Type 2 diabetes with nephropathy ([facility] [address] E11.21    Overweight with body mass index (BMI) of 25 to 25.9 in adult E66.3, Z68.25     Assessment and plan:  1. Diabetes. F/u w Dr Kita Cosme and Dr Tony Aviles. 2.  Dyslipidemia. Continue current regimen. 3.  Hypertension. Continue current  4. GERD. PPI and avoidance measures  5. Hypovit d. Doing well   6. TIA. Continue current regimen. 7.  Nephrolithiasis. F/U Dr Marylou Keller  8. Vascular. F/U Dr Tristin Mcconnell      RT 9/21    Above conditions discussed at length and patient vocalized understanding.   All questions answered to patient satisfaction

## 2021-03-23 ENCOUNTER — OFFICE VISIT (OUTPATIENT)
Dept: INTERNAL MEDICINE CLINIC | Age: 71
End: 2021-03-23
Payer: MEDICARE

## 2021-03-23 VITALS
TEMPERATURE: 97.2 F | OXYGEN SATURATION: 100 % | RESPIRATION RATE: 14 BRPM | SYSTOLIC BLOOD PRESSURE: 130 MMHG | BODY MASS INDEX: 29.41 KG/M2 | HEART RATE: 72 BPM | WEIGHT: 166 LBS | HEIGHT: 63 IN | DIASTOLIC BLOOD PRESSURE: 80 MMHG

## 2021-03-23 DIAGNOSIS — E11.21 TYPE 2 DIABETES WITH NEPHROPATHY (HCC): ICD-10-CM

## 2021-03-23 DIAGNOSIS — K21.9 GERD WITHOUT ESOPHAGITIS: ICD-10-CM

## 2021-03-23 DIAGNOSIS — I10 ESSENTIAL HYPERTENSION: Primary | ICD-10-CM

## 2021-03-23 DIAGNOSIS — E78.5 DYSLIPIDEMIA: ICD-10-CM

## 2021-03-23 DIAGNOSIS — G45.9 TIA (TRANSIENT ISCHEMIC ATTACK): ICD-10-CM

## 2021-03-23 PROCEDURE — 1101F PT FALLS ASSESS-DOCD LE1/YR: CPT | Performed by: INTERNAL MEDICINE

## 2021-03-23 PROCEDURE — 3017F COLORECTAL CA SCREEN DOC REV: CPT | Performed by: INTERNAL MEDICINE

## 2021-03-23 PROCEDURE — 1090F PRES/ABSN URINE INCON ASSESS: CPT | Performed by: INTERNAL MEDICINE

## 2021-03-23 PROCEDURE — G8510 SCR DEP NEG, NO PLAN REQD: HCPCS | Performed by: INTERNAL MEDICINE

## 2021-03-23 PROCEDURE — G8417 CALC BMI ABV UP PARAM F/U: HCPCS | Performed by: INTERNAL MEDICINE

## 2021-03-23 PROCEDURE — 99214 OFFICE O/P EST MOD 30 MIN: CPT | Performed by: INTERNAL MEDICINE

## 2021-03-23 PROCEDURE — G8427 DOCREV CUR MEDS BY ELIG CLIN: HCPCS | Performed by: INTERNAL MEDICINE

## 2021-03-23 PROCEDURE — G8754 DIAS BP LESS 90: HCPCS | Performed by: INTERNAL MEDICINE

## 2021-03-23 PROCEDURE — 3052F HG A1C>EQUAL 8.0%<EQUAL 9.0%: CPT | Performed by: INTERNAL MEDICINE

## 2021-03-23 PROCEDURE — G8536 NO DOC ELDER MAL SCRN: HCPCS | Performed by: INTERNAL MEDICINE

## 2021-03-23 PROCEDURE — G8752 SYS BP LESS 140: HCPCS | Performed by: INTERNAL MEDICINE

## 2021-03-23 PROCEDURE — G8399 PT W/DXA RESULTS DOCUMENT: HCPCS | Performed by: INTERNAL MEDICINE

## 2021-03-23 PROCEDURE — 2022F DILAT RTA XM EVC RTNOPTHY: CPT | Performed by: INTERNAL MEDICINE

## 2021-03-23 PROCEDURE — G0463 HOSPITAL OUTPT CLINIC VISIT: HCPCS | Performed by: INTERNAL MEDICINE

## 2021-03-23 NOTE — PROGRESS NOTES
Quique Marlow presents today for   Chief Complaint   Patient presents with    Diabetes     f/u with labs               Depression Screening:  3 most recent PHQ Screens 3/23/2021   PHQ Not Done -   Little interest or pleasure in doing things Not at all   Feeling down, depressed, irritable, or hopeless Not at all   Total Score PHQ 2 0       Learning Assessment:  Learning Assessment 11/24/2020   PRIMARY LEARNER Patient   HIGHEST LEVEL OF EDUCATION - PRIMARY LEARNER  -   BARRIERS PRIMARY LEARNER -   CO-LEARNER CAREGIVER -   PRIMARY LANGUAGE ENGLISH   LEARNER PREFERENCE PRIMARY LISTENING   ANSWERED BY patient   RELATIONSHIP SELF       Abuse Screening:  Abuse Screening Questionnaire 3/23/2021   Do you ever feel afraid of your partner? N   Are you in a relationship with someone who physically or mentally threatens you? N   Is it safe for you to go home? Y       Fall Risk  Fall Risk Assessment, last 12 mths 3/23/2021   Able to walk? Yes   Fall in past 12 months? 0   Do you feel unsteady? 0   Are you worried about falling 0   Number of falls in past 12 months -   Fall with injury? -           Coordination of Care:  1. Have you been to the ER, urgent care clinic since your last visit? Hospitalized since your last visit? no    2. Have you seen or consulted any other health care providers outside of the [address] [place] [address] since your last visit? Include any pap smears or colon screening.  no

## 2021-06-01 DIAGNOSIS — G45.9 TRANSIENT CEREBRAL ISCHEMIA, UNSPECIFIED TYPE: ICD-10-CM

## 2021-06-01 RX ORDER — CLOPIDOGREL BISULFATE 75 MG/1
TABLET ORAL
Qty: 90 TABLET | Refills: 3 | Status: SHIPPED | OUTPATIENT
Start: 2021-06-01 | End: 2022-06-07

## 2021-08-26 LAB — HBA1C MFR BLD HPLC: 8.4 %

## 2021-09-01 ENCOUNTER — APPOINTMENT (OUTPATIENT)
Dept: INTERNAL MEDICINE CLINIC | Age: 71
End: 2021-09-01

## 2021-09-01 ENCOUNTER — HOSPITAL ENCOUNTER (OUTPATIENT)
Dept: LAB | Age: 71
Discharge: HOME | End: 2021-09-01
Payer: MEDICARE

## 2021-09-01 DIAGNOSIS — E11.21 TYPE 2 DIABETES WITH NEPHROPATHY (HCC): ICD-10-CM

## 2021-09-01 LAB
CREAT UR-MCNC: 315 MG/DL (ref 30–125)
HBA1C MFR BLD: 8.8 % (ref 4.2–5.6)
MICROALBUMIN UR-MCNC: 7.57 MG/DL (ref 0–3)
MICROALBUMIN/CREAT UR-RTO: 24 MG/G (ref 0–30)

## 2021-09-01 PROCEDURE — 83036 HEMOGLOBIN GLYCOSYLATED A1C: CPT

## 2021-09-01 PROCEDURE — 36415 COLL VENOUS BLD VENIPUNCTURE: CPT

## 2021-09-01 PROCEDURE — 82043 UR ALBUMIN QUANTITATIVE: CPT

## 2021-09-04 NOTE — PROGRESS NOTES
This is a subsequent medicare wellness visit    I have reviewed the patient's medical history in detail and updated the computerized patient record. Assessment/Plan   Education and counseling provided:  Are appropriate based on today's review and evaluation  End-of-Life planning (with patient's consent)  Influenza Vaccine  Screening Mammography  Colorectal cancer screening tests  Cardiovascular screening blood test  Bone mass measurement (DEXA)    1. Medicare annual wellness visit, subsequent  2. Seasonal allergic rhinitis, unspecified trigger  -     fluticasone propionate (FLONASE) 50 mcg/actuation nasal spray; 2 sprays each nostril daily, Normal, Disp-1 Each, R-11  3. Essential hypertension  4. GERD without esophagitis  5. Type 2 diabetes with nephropathy (HCC)  -     CBC W/O DIFF; Future  -     METABOLIC PANEL, COMPREHENSIVE; Future  -     HEMOGLOBIN A1C WITH EAG; Future  6. Dyslipidemia  7. Senile cataract of left eye, unspecified age-related cataract type  8. Preop exam for internal medicine  9. Advanced directives, counseling/discussion  -     ADVANCE CARE PLANNING FIRST 30 MINS       Depression Risk Factor Screening     3 most recent PHQ Screens 3/23/2021   PHQ Not Done -   Little interest or pleasure in doing things Not at all   Feeling down, depressed, irritable, or hopeless Not at all   Total Score PHQ 2 0       Alcohol Risk Screen    Do you average more than 1 drink per night or more than 7 drinks a week:  No    On any one occasion in the past three months have you have had more than 3 drinks containing alcohol:  No        Functional Ability and Level of Safety    Hearing: Hearing is good. Activities of Daily Living: The home contains: no safety equipment. Patient does total self care      Ambulation: with no difficulty     Fall Risk:  Fall Risk Assessment, last 12 mths 3/23/2021   Able to walk? Yes   Fall in past 12 months? 0   Do you feel unsteady?  0   Are you worried about falling 0 Number of falls in past 12 months -   Fall with injury?  -      Abuse Screen:  Patient is not abused       Cognitive Screening    Has your family/caregiver stated any concerns about your memory: no     Cognitive Screening: Normal - Mini Cog Test    Health Maintenance Due     Health Maintenance Due   Topic Date Due    Flu Vaccine (1) 09/01/2021    Foot Exam Q1  09/16/2021       Patient Care Team   Patient Care Team:  Sanchez Harkins MD as PCP - General (Internal Medicine)  Sanchez Harkins MD as PCP - [facility]  Karlie Leija MD (Ophthalmology)  Scott Delgado, ESTHER Perez MD (Vascular Surgery)    History     Patient Active Problem List   Diagnosis Code    Hypovitaminosis D E55.9    Dyslipidemia E78.5    Essential hypertension I10    GERD without esophagitis K21.9    Advance directive discussed with patient Z70.80    Nephrolithiasis s/p EWSL Dr Sreedhar Torres 6/16 N20.0    TIA (transient ischemic attack) G45.9    Symptomatic carotid artery stenosis I65.29    Type 2 diabetes with nephropathy ([facility] [address] E11.21    Overweight with body mass index (BMI) of 25 to 25.9 in adult E66.3, Z68.25     Past Medical History:   Diagnosis Date    Diabetes ([facility] [address]     Dr Naheed Soares GERD (gastroesophageal reflux disease)     Hyperlipidemia     Hypertension     Hypovitaminosis D     Nephrolithiasis 06/2016    Dr Sreedhar Torres; right ureteral stone s/p EWSL    Obesity     W - 12/19    Seasonal allergic rhinitis     TIA (transient ischemic attack) 04/2018      Past Surgical History:   Procedure Laterality Date    ECHO 2D ADULT  11/2017    conc lvh, ef 60%, DD (3/12); nl lv, ef 68%, conc lvh, no wma, mild dd, no valvular abn, neg bubble study (11/17 - [address])    HX BREAST BIOPSY  2009    negative    HX COLONOSCOPY      Dr Sneed Aas 12/13 neg    HX ORTHOPAEDIC      DEXA t score 1.10 spine, 1.10 hip (11/13)    HX UROLOGICAL  06/10/2016    RIGHT ESWL Dr. Lyndol Schilder UNLISTED  11/2017    mri showed mod periventricular wm changes    [place] CARDIAC SURG PROCEDURE UNLIST  01/2018    holter few pac/pvc, 4 beat run svt rate 156    [place] CARDIAC SURG PROCEDURE UNLIST      thallium neg ef 70% (3/12); Dr Shey Rainey; NST neg, ef 76%    VASCULAR SURGERY PROCEDURE UNLIST  11/2017    BICA<50%, RIGHT vertebral art obst    VASCULAR SURGERY PROCEDURE UNLIST  12/2017    CTA head/neck showed no aneurysm, <50% right intracranial ICA, diminutive right vertebral art    VASCULAR SURGERY PROCEDURE UNLIST  04/2018    JUAN R 1.0, L 1.1    VASCULAR SURGERY PROCEDURE UNLIST  04/2018    MRA right vert artery not visualized due to occlusion/stenosis, left patent, prox PAIGE 50-60% stenosis    VASCULAR SURGERY PROCEDURE UNLIST  10/2019    CTA showed 04% PAIGE, 41% prox LICA, mild RSCA sten, mod LSCA stenosis; Dr Srinivas Saleem     Current Outpatient Medications   Medication Sig Dispense Refill    fluticasone propionate (FLONASE) 50 mcg/actuation nasal spray 2 sprays each nostril daily 1 Each 11    BD Veo Insulin Syringe UF 0.3 mL 31 gauge x 15/64\" syrg USE four times a day DX.E11.65      clopidogreL (PLAVIX) 75 mg tab take 1 tablet by mouth once daily 90 Tablet 3    atorvastatin (LIPITOR) 40 mg tablet take 1 tablet by mouth once daily 90 Tab 3    carvediloL (COREG) 25 mg tablet take 1 tablet by mouth twice a day with meals 180 Tab 3    olmesartan (BENICAR) 40 mg tablet       exenatide microspheres (Bydureon) 2 mg/0.65 mL pnij 2 mg by SubCUTAneous route every seven (7) days.  NOVOLIN R REGULAR U-100 INSULN 100 unit/mL injection 7 Units by SubCUTAneous route.  0    NOVOLIN N NPH U-100 INSULIN 100 unit/mL injection 30 Units. 0    folic acid/multivit-min/lutein (CENTRUM SILVER PO) Take 1 Tab by mouth daily.  metFORMIN (GLUCOPHAGE) 1,000 mg tablet Take 1,000 mg by mouth two (2) times daily (with meals).  amLODIPine (NORVASC) 10 mg tablet Take 10 mg by mouth daily.       Cholecalciferol, Vitamin D3, (VITAMIN D) 5,000 unit Tab Take 5,000 Units by mouth daily.  diclofenac (VOLTAREN) 1 % gel Apply 4 g to affected area four (4) times daily. Left knee and left leg (Patient not taking: Reported on 9/9/2021) 5 Each 5     Allergies   Allergen Reactions    Lopid [Gemfibrozil] Other (comments)     elev lfts    Penicillins Shortness of Breath and Nausea and Vomiting       Family History   Problem Relation Age of Onset    Diabetes Father     Hypertension Father     Cancer Sister     Hypertension Sister      Social History     Tobacco Use    Smoking status: Never Smoker    Smokeless tobacco: Never Used   Substance Use Topics    Alcohol use:  Yes     Alcohol/week: 2.0 standard drinks     Types: 1 Glasses of wine, 1 Cans of beer per week     Comment: social glass once a year         Win Tellez MD

## 2021-09-04 NOTE — PROGRESS NOTES
70 y.o. BLACK/ female who presents for evaluation. Here for med clearance as well for planned left cataract surgery by Dr Good Figures    She continues to see Dr Peyton Ceron. The CGM gives her readings from the 60s to mid 200s. Denies polyuria, polydipsia, nocturia, vision change. Diet is better by her report but frustrated as she can't get the a1c below 8. She had gi upset with bydureon but might be interested in later gen glps    Reports no cardiovascular complaints. bp running in the 120-130s over 70s reliably on current regimen. Reports that she is walking a lot now    No GI or Gu complaints. LAST MEDICARE WELLNESS EXAM: 12/29/15, 6/27/17, 7/16/18, 9/19/19, 9/14/20, 9/9/21    Past Medical History:   Diagnosis Date    Diabetes ([facility] [address]     Dr Eneida Stovall GERD (gastroesophageal reflux disease)     Hyperlipidemia     Hypertension     Hypovitaminosis D     Nephrolithiasis 06/2016    Dr Patricia Richmond; right ureteral stone s/p EWSL    Obesity     W - 12/19    Seasonal allergic rhinitis     TIA (transient ischemic attack) 04/2018     Past Surgical History:   Procedure Laterality Date    ECHO 2D ADULT  11/2017    conc lvh, ef 60%, DD (3/12); nl lv, ef 68%, conc lvh, no wma, mild dd, no valvular abn, neg bubble study (11/17 - [facility])    HX BREAST BIOPSY  2009    negative    HX COLONOSCOPY      Dr Ileana Flowers 12/13 neg    HX ORTHOPAEDIC      DEXA t score 1.10 spine, 1.10 hip (11/13)    HX UROLOGICAL  06/10/2016    RIGHT ESWL Dr. Trina Smith  11/2017    mri showed mod periventricular wm changes    ID CARDIAC SURG PROCEDURE UNLIST  01/2018    holter few pac/pvc, 4 beat run svt rate 156    ID CARDIAC SURG PROCEDURE UNLIST      thallium neg ef 70% (3/12);  Dr Rico Puls; NST neg, ef 76%    VASCULAR SURGERY PROCEDURE UNLIST  11/2017    BICA<50%, RIGHT vertebral art obst    VASCULAR SURGERY PROCEDURE UNLIST  12/2017    CTA head/neck showed no aneurysm, <50% right intracranial ICA, diminutive right vertebral art    VASCULAR SURGERY PROCEDURE UNLIST  04/2018    JUAN R 1.0, L 1.1    VASCULAR SURGERY PROCEDURE UNLIST  04/2018    MRA right vert artery not visualized due to occlusion/stenosis, left patent, prox PAIGE 50-60% stenosis    VASCULAR SURGERY PROCEDURE UNLIST  10/2019    CTA showed 55% PAIGE, 12% prox LICA, mild RSCA sten, mod LSCA stenosis; Dr Pike Neighbours History     Socioeconomic History    Marital status:      Spouse name: Not on file    Number of children: Not on file    Years of education: Not on file    Highest education level: Not on file   Occupational History    Occupation: retired teacher [employer]   Tobacco Use    Smoking status: Never Smoker    Smokeless tobacco: Never Used   Substance and Sexual Activity    Alcohol use: Yes     Alcohol/week: 2.0 standard drinks     Types: 1 Glasses of wine, 1 Cans of beer per week     Comment: social glass once a year    Drug use: No    Sexual activity: Not on file   Other Topics Concern    Not on file   Social History Narrative    Not on file     Social Determinants of Health     Financial Resource Strain:     Difficulty of Paying Living Expenses:    Food Insecurity:     Worried About Running Out of Food in the Last Year:     [address] in the Last Year:    Transportation Needs:     Lack of Transportation (Medical):      Lack of Transportation (Non-Medical):    Physical Activity:     Days of Exercise per Week:     Minutes of Exercise per Session:    Stress:     Feeling of Stress :    Social Connections:     Frequency of Communication with Friends and Family:     Frequency of Social Gatherings with Friends and Family:     Attends Denominational Services:     Active Member of Clubs or Organizations:     Attends Club or Organization Meetings:     Marital Status:    Intimate Partner Violence:     Fear of Current or Ex-Partner:     Emotionally Abused:     Physically Abused:     Sexually Abused: Allergies   Allergen Reactions    Lopid [Gemfibrozil] Other (comments)     elev lfts    Penicillins Shortness of Breath and Nausea and Vomiting       Current Outpatient Medications   Medication Sig    fluticasone propionate (FLONASE) 50 mcg/actuation nasal spray 2 sprays each nostril daily    BD Veo Insulin Syringe UF 0.3 mL 31 gauge x 15/64\" syrg USE four times a day DX.E11.65    clopidogreL (PLAVIX) 75 mg tab take 1 tablet by mouth once daily    atorvastatin (LIPITOR) 40 mg tablet take 1 tablet by mouth once daily    carvediloL (COREG) 25 mg tablet take 1 tablet by mouth twice a day with meals    olmesartan (BENICAR) 40 mg tablet     exenatide microspheres (Bydureon) 2 mg/0.65 mL pnij 2 mg by SubCUTAneous route every seven (7) days.  NOVOLIN R REGULAR U-100 INSULN 100 unit/mL injection 7 Units by SubCUTAneous route.  NOVOLIN N NPH U-100 INSULIN 100 unit/mL injection 30 Units.  folic acid/multivit-min/lutein (CENTRUM SILVER PO) Take 1 Tab by mouth daily.  metFORMIN (GLUCOPHAGE) 1,000 mg tablet Take 1,000 mg by mouth two (2) times daily (with meals).  amLODIPine (NORVASC) 10 mg tablet Take 10 mg by mouth daily.  Cholecalciferol, Vitamin D3, (VITAMIN D) 5,000 unit Tab Take 5,000 Units by mouth daily.  diclofenac (VOLTAREN) 1 % gel Apply 4 g to affected area four (4) times daily. Left knee and left leg (Patient not taking: Reported on 9/9/2021)     No current facility-administered medications for this visit.      REVIEW OF SYSTEMS: gyn>5 yrs, mammo 9/19, DEXA 11/13, sees Dr Marisel Doran, no podiatry, colo 12/13 Dr Cristobal Benavides  Ophtho - no vision change or eye pain  Oral - no mouth pain, tongue or tooth problems  Ears - no hearing loss, ear pain, fullness, no swallowing problems  Cardiac - no CP, PND, orthopnea, edema, palpitations or syncope  Chest - no breast masses  Resp - no wheezing, chronic coughing, dyspnea  GI - no heartburn, nausea, vomiting, change in bowel habits, bleeding, hemorrhoids  Urinary - no dysuria, hematuria, flank pain, urgency, frequency  Genitals - no genital lesions, discharge, masses, ulceration, warts  Ortho - no swelling, dec ROM, myalgias    Visit Vitals  /74   Pulse 69   Temp 97.9 °F (36.6 °C)   Resp 10   Ht 5' 3\" (1.6 m)   Wt 163 lb (73.9 kg)   SpO2 99%   BMI 28.87 kg/m²     A&O x3  Affect is appropriate. Mood stable  No apparent distress  Anicteric, no JVD, adenopathy or thyromegaly. No carotid bruits or radiated murmur  Lungs clear to auscultation, no wheezes or rales  Heart showed regular rate and rhythm. No murmur, rubs, gallops  Abdomen soft nontender, no hepatosplenomegaly or masses.    Ext without c/c/e    LABS  From 2/12 showed   gluc 330, cr 0.93, gfr 77,  alt 30, hba1c 11.2,                   chol 199, tg 366, hdl 35, ldl-c 67,   wbc 7.1, hb 15.2, plt 273,  tsh 2.20  From 6/12 showed                   hba1c 8.2,   ldl-p 1018, chol 112, tg 95,   hdl 32, ldl-c 61,   umar 1.5  From 12/12 showed                   hba1c 8.9,   ldl-p 1486, chol 132, tg 109, hdl 37, ldl-c 73  From 6/13 showed   gluc 128, cr 0.91, gfr 68,  alt 13, hba1c 7.8,                  chol 115, tg 157, hdl 28, ldl-c 56  From 12/13 showed                   hba1c 7.5,                  wbc 7.5, hb 13.2, plt 297,  vit d 69.2  From 6/14 showed   gluc 171, cr 0.85, gfr>60, alt<5,        chol 134, tg 165, hdl 26, ldl-c 73  From 12/14 showed        hba1c 9.2,   umar 4       chol 151, tg 256, hdl 33, ldl-c 67,     From 3/15 showed       hba1c 7.3,   umar neg           ldl-c 82,   From 7/15 showed   gluc 130, cr 1.16, gfr 49,  alt 15, hba1c 7.5,       chol 128, tg 271, hdl 29, ldl-c 45  From 12/15 showed        hba1c 9.6,   umar 7.0    chol 160, tg 218, hdl 33, ldl-c 83,     From 2/16 showed   gluc 117, cr 1.15, gfr 58,  alt 18, hba1c 7.9,   umar 4.7    chol 112, tg 184, hdl 30, ldl-c 45,   ,                      vit d 45.3, uric 7.2  From 6/16 showed   gluc 98      hba1c 7.7, chol 143, tg 178, hdl 31, ldl-c 76  From 12/16 showed gluc 170, cr 1.11, gfr 60,    hba1c 8.9,   umar 4.0  From 6/17 showed   gluc 148, cr 1.06, gfr>60,    hba1c 8.1,       chol 192, tg 202, hdl 39, ldl-c 113, wbc 6.3, hb 13.8, plt 281  From 1/18 showed   gluc 154, cr 1.08, gfr>60, alt 11, hba1c 8.0,   umar 16.0,                              vit d 40.7  From 4/18 showed   gluc 140, cr 0.90, gfr>60,    hba1c 8.5,       chol 216, tg 356, hdl 31, ldl-c na    wbc 7.0, hb 13.1, plt 244, ck/trop neg  From 5/18 showed        hba1c 8.1  From 4/19 showed   gluc 237, cr 0.98, gfr 69,  alt 14,        chol 121, tg 135, hdl 29, ldl-c 65  From 9/19 showed   gluc 218, cr 1.11, gfr 59,  alt 20, hba1c 10.8, umar 17     chol 110, tg 127, hdl 30, ldl-c 55,   wbc 6.9, hb 13.5, plt 260  From 3/20 showed   gluc 170, cr 1.04, gfr>60, alt 18, hba1c 7.7,       chol 121, tg 94,   hdl 36, ldl-c 66,   wbc 6.0, hb 13.6, plt 288  From 9/20 showed        hba1c 7.5,  umar 16,             vit d 69.3  From 3/21 showed   gluc 169, cr 0.98, gfr>60, alt 16, hba1c 8.1,       chol 151, tg 125, hdl 35, ldl-c 91,   wbc 7.4, hb 13.6, plt 300    Results for orders placed or performed during the hospital encounter of 09/01/21   HEMOGLOBIN A1C W/O EAG   Result Value Ref Range    Hemoglobin A1c 8.8 (H) 4.2 - 5.6 %   MICROALBUMIN, UR, RAND W/ MICROALB/CREAT RATIO   Result Value Ref Range    Microalbumin,urine random 7.57 (H) 0 - 3.0 MG/DL    Creatinine, urine 315.00 (H) 30 - 125 mg/dL    Microalbumin/Creat ratio (mg/g creat) 24 0 - 30 mg/g     We reviewed the patient's labs from the last several visits to point out trends in the numbers          Patient Active Problem List   Diagnosis Code    Hypovitaminosis D E55.9    Dyslipidemia E78.5    Essential hypertension I10    GERD without esophagitis K21.9    Advance directive discussed with patient Z70.80    Nephrolithiasis s/p EWSL Dr Michael Frazier 6/16 N20.0    TIA (transient ischemic attack) G45.9    Symptomatic carotid artery stenosis I65.29    Type 2 diabetes with nephropathy (HCC) E11.21    Overweight with body mass index (BMI) of 25 to 25.9 in adult E66.3, Z68.25     Assessment and plan:  1. Diabetes. F/u w Dr Kayleen Underwood and Dr Doug Mendosa. 2.  Dyslipidemia. At target on lipitor and recheck next draw  3. Hypertension. Continue coreg, arb, amlo  4. GERD. PPI and avoidance measures  5. Hypovit d.  supplementation   6. TIA. Continue current regimen. 7.  Nephrolithiasis. F/U Dr Dayanara Page  8. Vascular. F/U Dr Guera Cornelius  9. Ophtho. She is felt to be average risk for the planned procedure, no contraindications noted. RTC 3/22    Above conditions discussed at length and patient vocalized understanding. All questions answered to patient satisfaction        ICD-10-CM ICD-9-CM    1. Medicare annual wellness visit, subsequent  Z00.00 V70.0    2. Seasonal allergic rhinitis, unspecified trigger  J30.2 477.9 fluticasone propionate (FLONASE) 50 mcg/actuation nasal spray   3. Essential hypertension  I10 401.9    4. GERD without esophagitis  K21.9 530.81    5. Type 2 diabetes with nephropathy (HCC)  E11.21 250.40 CBC W/O DIFF     970.75 METABOLIC PANEL, COMPREHENSIVE      HEMOGLOBIN A1C WITH EAG   6. Dyslipidemia  E78.5 272.4 LIPID PANEL   7. Senile cataract of left eye, unspecified age-related cataract type  H25.9 366.10    8. Preop exam for internal medicine  Z01.818 V72.83    9.  Advanced directives, counseling/discussion  Z71.89 V65.49 ADVANCE CARE PLANNING FIRST 30 MINS

## 2021-09-09 ENCOUNTER — OFFICE VISIT (OUTPATIENT)
Dept: INTERNAL MEDICINE CLINIC | Age: 71
End: 2021-09-09
Payer: MEDICARE

## 2021-09-09 DIAGNOSIS — E78.5 DYSLIPIDEMIA: ICD-10-CM

## 2021-09-09 DIAGNOSIS — Z71.89 ADVANCED DIRECTIVES, COUNSELING/DISCUSSION: ICD-10-CM

## 2021-09-09 DIAGNOSIS — K21.9 GERD WITHOUT ESOPHAGITIS: ICD-10-CM

## 2021-09-09 DIAGNOSIS — H25.9 SENILE CATARACT OF LEFT EYE, UNSPECIFIED AGE-RELATED CATARACT TYPE: ICD-10-CM

## 2021-09-09 DIAGNOSIS — Z01.818 PREOP EXAM FOR INTERNAL MEDICINE: ICD-10-CM

## 2021-09-09 DIAGNOSIS — I10 ESSENTIAL HYPERTENSION: ICD-10-CM

## 2021-09-09 DIAGNOSIS — J30.2 SEASONAL ALLERGIC RHINITIS, UNSPECIFIED TRIGGER: ICD-10-CM

## 2021-09-09 DIAGNOSIS — E11.21 TYPE 2 DIABETES WITH NEPHROPATHY (HCC): ICD-10-CM

## 2021-09-09 DIAGNOSIS — Z00.00 MEDICARE ANNUAL WELLNESS VISIT, SUBSEQUENT: Primary | ICD-10-CM

## 2021-09-09 PROCEDURE — G8754 DIAS BP LESS 90: HCPCS | Performed by: INTERNAL MEDICINE

## 2021-09-09 PROCEDURE — 99497 ADVNCD CARE PLAN 30 MIN: CPT | Performed by: INTERNAL MEDICINE

## 2021-09-09 PROCEDURE — 2022F DILAT RTA XM EVC RTNOPTHY: CPT | Performed by: INTERNAL MEDICINE

## 2021-09-09 PROCEDURE — G8432 DEP SCR NOT DOC, RNG: HCPCS | Performed by: INTERNAL MEDICINE

## 2021-09-09 PROCEDURE — 3017F COLORECTAL CA SCREEN DOC REV: CPT | Performed by: INTERNAL MEDICINE

## 2021-09-09 PROCEDURE — G8427 DOCREV CUR MEDS BY ELIG CLIN: HCPCS | Performed by: INTERNAL MEDICINE

## 2021-09-09 PROCEDURE — 1101F PT FALLS ASSESS-DOCD LE1/YR: CPT | Performed by: INTERNAL MEDICINE

## 2021-09-09 PROCEDURE — 3052F HG A1C>EQUAL 8.0%<EQUAL 9.0%: CPT | Performed by: INTERNAL MEDICINE

## 2021-09-09 PROCEDURE — 1090F PRES/ABSN URINE INCON ASSESS: CPT | Performed by: INTERNAL MEDICINE

## 2021-09-09 PROCEDURE — G0463 HOSPITAL OUTPT CLINIC VISIT: HCPCS | Performed by: INTERNAL MEDICINE

## 2021-09-09 PROCEDURE — G8536 NO DOC ELDER MAL SCRN: HCPCS | Performed by: INTERNAL MEDICINE

## 2021-09-09 PROCEDURE — G0439 PPPS, SUBSEQ VISIT: HCPCS | Performed by: INTERNAL MEDICINE

## 2021-09-09 PROCEDURE — 99214 OFFICE O/P EST MOD 30 MIN: CPT | Performed by: INTERNAL MEDICINE

## 2021-09-09 PROCEDURE — G8399 PT W/DXA RESULTS DOCUMENT: HCPCS | Performed by: INTERNAL MEDICINE

## 2021-09-09 PROCEDURE — G8419 CALC BMI OUT NRM PARAM NOF/U: HCPCS | Performed by: INTERNAL MEDICINE

## 2021-09-09 PROCEDURE — G8752 SYS BP LESS 140: HCPCS | Performed by: INTERNAL MEDICINE

## 2021-09-09 RX ORDER — FLUTICASONE PROPIONATE 50 MCG
SPRAY, SUSPENSION (ML) NASAL
Qty: 1 EACH | Refills: 11 | Status: SHIPPED | OUTPATIENT
Start: 2021-09-09

## 2021-09-09 NOTE — PROGRESS NOTES
Pt in office for eval for left cataract surgery w/Dr. Dirk Mishra on 9/20. Pt needs refill on flonase. She is also requesting a cough medicine. She said the cough is 'off and on'. Seems to be worse at night. 1. Have you been to the ER, urgent care clinic since your last visit? Hospitalized since your last visit? No    2. Have you seen or consulted any other health care providers outside of the [address] [place] [address] since your last visit? Include any pap smears or colon screening.  No

## 2021-09-10 VITALS
RESPIRATION RATE: 10 BRPM | BODY MASS INDEX: 28.88 KG/M2 | DIASTOLIC BLOOD PRESSURE: 74 MMHG | SYSTOLIC BLOOD PRESSURE: 132 MMHG | HEIGHT: 63 IN | HEART RATE: 69 BPM | OXYGEN SATURATION: 99 % | WEIGHT: 163 LBS | TEMPERATURE: 97.9 F

## 2021-09-10 LAB — MAMMOGRAPHY, EXTERNAL: NORMAL

## 2021-09-10 NOTE — ACP (ADVANCE CARE PLANNING)
Advance Care Planning     Advance Care Planning (ACP) Physician/NP/PA Conversation      Date of Conversation: 9/9/2021  Conducted with: Patient with Decision Making Capacity    Healthcare Decision Maker:     Primary Decision Maker: Teofilo Londono - Other Relative - [phone]    Primary Decision Maker: Long Pearce - Daughter - [phone]  Click here to complete [employer] including selection of the Healthcare Decision Maker Relationship (ie \"Primary\")        Today we documented Decision Maker(s) consistent with Legal Next of Kin hierarchy. Care Preferences:    Hospitalization: \"If your health worsens and it becomes clear that your chance of recovery is unlikely, what would be your preference regarding hospitalization? \"  The patient would prefer hospitalization. Ventilation: \"If you were unable to breathe on your own and your chance of recovery was unlikely, what would be your preference about the use of a ventilator (breathing machine) if it was available to you? \"   The patient would desire the use of a ventilator. Resuscitation: \"In the event your heart stopped as a result of an underlying serious health condition, would you want attempts to be made to restart your heart, or would you prefer a natural death? \"   Yes, attempt to resuscitate.     Additional topics discussed: ventilation preferences, hospitalization preferences and resuscitation preferences    Conversation Outcomes / Follow-Up Plan:   ACP in process - information provided, considering goals and options  Reviewed DNR/DNI and patient elects Full Code (Attempt Resuscitation)     Length of Voluntary ACP Conversation in minutes:  16 minutes    Randy Alcazar MD

## 2021-09-10 NOTE — PATIENT INSTRUCTIONS

## 2021-11-24 DIAGNOSIS — I65.23 BILATERAL CAROTID ARTERY STENOSIS: ICD-10-CM

## 2021-12-02 ENCOUNTER — OFFICE VISIT (OUTPATIENT)
Dept: VASCULAR SURGERY | Age: 71
End: 2021-12-02
Payer: MEDICARE

## 2021-12-02 VITALS
WEIGHT: 163 LBS | DIASTOLIC BLOOD PRESSURE: 82 MMHG | OXYGEN SATURATION: 98 % | SYSTOLIC BLOOD PRESSURE: 138 MMHG | HEART RATE: 61 BPM | BODY MASS INDEX: 28.88 KG/M2 | HEIGHT: 63 IN

## 2021-12-02 DIAGNOSIS — I77.1 SUBCLAVIAN ARTERY STENOSIS, LEFT (HCC): ICD-10-CM

## 2021-12-02 DIAGNOSIS — I65.23 CAROTID STENOSIS, ASYMPTOMATIC, BILATERAL: Primary | ICD-10-CM

## 2021-12-02 PROCEDURE — G8752 SYS BP LESS 140: HCPCS | Performed by: SURGERY

## 2021-12-02 PROCEDURE — G8754 DIAS BP LESS 90: HCPCS | Performed by: SURGERY

## 2021-12-02 PROCEDURE — G8510 SCR DEP NEG, NO PLAN REQD: HCPCS | Performed by: SURGERY

## 2021-12-02 PROCEDURE — 3017F COLORECTAL CA SCREEN DOC REV: CPT | Performed by: SURGERY

## 2021-12-02 PROCEDURE — 99213 OFFICE O/P EST LOW 20 MIN: CPT | Performed by: SURGERY

## 2021-12-02 PROCEDURE — G8427 DOCREV CUR MEDS BY ELIG CLIN: HCPCS | Performed by: SURGERY

## 2021-12-02 PROCEDURE — 1101F PT FALLS ASSESS-DOCD LE1/YR: CPT | Performed by: SURGERY

## 2021-12-02 PROCEDURE — G8536 NO DOC ELDER MAL SCRN: HCPCS | Performed by: SURGERY

## 2021-12-02 PROCEDURE — G8419 CALC BMI OUT NRM PARAM NOF/U: HCPCS | Performed by: SURGERY

## 2021-12-02 PROCEDURE — G8399 PT W/DXA RESULTS DOCUMENT: HCPCS | Performed by: SURGERY

## 2021-12-02 PROCEDURE — 1090F PRES/ABSN URINE INCON ASSESS: CPT | Performed by: SURGERY

## 2021-12-02 NOTE — PROGRESS NOTES
1. Have you been to an emergency room or urgent care clinic since your last visit? No    Hospitalized since your last visit? If yes, where, when, and reason for visit? No  2. Have you seen or consulted any other health care providers outside of the [facility] since your last visit including any procedures, health maintenance items. If yes, where, when and reason for visit?  No

## 2021-12-02 NOTE — PROGRESS NOTES
Scooter Borden  Chief Complaint   Patient presents with    Carotid Artery Stenosis       History and Physical    69 y/o F returns to clinic for f/u of known carotid artery stenosis. She denies sx of CVA/TIA/AF. In the remote past, she had a TIA, which consisted with garbled speech and generalized weakness, causing her to fall on the floor. She has had no further similar sx. She takes plavix and atorvastatin. She has DM2 and she has never smoked. No afib. She denies syncope and pre-syncopal symptoms. She does endorse some left arm fatigue, but is not able to fully characterize the symptom at this time. She has not noted syncope or light-headedness while using her left arm.      Past Medical History:   Diagnosis Date    Diabetes ([facility] [address]     Dr Krish Romo GERD (gastroesophageal reflux disease)     Hyperlipidemia     Hypertension     Hypovitaminosis D     Nephrolithiasis 06/2016    Dr Graciela Rae; right ureteral stone s/p EWSL    Obesity     W - 12/19    Seasonal allergic rhinitis     TIA (transient ischemic attack) 04/2018     Patient Active Problem List   Diagnosis Code    Hypovitaminosis D E55.9    Dyslipidemia E78.5    Essential hypertension I10    GERD without esophagitis K21.9    Advance directive discussed with patient Z70.80    Nephrolithiasis s/p EWSL Dr Graciela Rae 6/16 N20.0    TIA (transient ischemic attack) G45.9    Symptomatic carotid artery stenosis I65.29    Type 2 diabetes with nephropathy (HCC) E11.21    Overweight with body mass index (BMI) of 25 to 25.9 in adult E66.3, Z68.25     Past Surgical History:   Procedure Laterality Date    ECHO 2D ADULT  11/2017    conc lvh, ef 60%, DD (3/12); nl lv, ef 68%, conc lvh, no wma, mild dd, no valvular abn, neg bubble study (11/17 - [address])    HX BREAST BIOPSY  2009    negative    HX COLONOSCOPY      Dr Daniel Doss 12/13 neg    HX ORTHOPAEDIC      DEXA t score 1.10 spine, 1.10 hip (11/13)    HX UROLOGICAL  06/10/2016    RIGHT ESWL Dr. Algie Leventhal- [facility]    NEUROLOGICAL PROCEDURE UNLISTED  11/2017    mri showed mod periventricular wm changes    CO CARDIAC SURG PROCEDURE UNLIST  01/2018    holter few pac/pvc, 4 beat run svt rate 156    CO CARDIAC SURG PROCEDURE UNLIST      thallium neg ef 70% (3/12); Dr Jimbo Ortega; NST neg, ef 76%    VASCULAR SURGERY PROCEDURE UNLIST  11/2017    BICA<50%, RIGHT vertebral art obst    VASCULAR SURGERY PROCEDURE UNLIST  12/2017    CTA head/neck showed no aneurysm, <50% right intracranial ICA, diminutive right vertebral art    VASCULAR SURGERY PROCEDURE UNLIST  04/2018    JUAN R 1.0, L 1.1    VASCULAR SURGERY PROCEDURE UNLIST  04/2018    MRA right vert artery not visualized due to occlusion/stenosis, left patent, prox PAIGE 50-60% stenosis    VASCULAR SURGERY PROCEDURE UNLIST  10/2019    CTA showed 17% PAIGE, 64% prox LICA, mild RSCA sten, mod LSCA stenosis; Dr Liliya Thornton     Current Outpatient Medications   Medication Sig Dispense Refill    fluticasone propionate (FLONASE) 50 mcg/actuation nasal spray 2 sprays each nostril daily 1 Each 11    BD Veo Insulin Syringe UF 0.3 mL 31 gauge x 15/64\" syrg USE four times a day DX.E11.65      clopidogreL (PLAVIX) 75 mg tab take 1 tablet by mouth once daily 90 Tablet 3    carvediloL (COREG) 25 mg tablet take 1 tablet by mouth twice a day with meals 180 Tab 3    olmesartan (BENICAR) 40 mg tablet       exenatide microspheres (Bydureon) 2 mg/0.65 mL pnij 2 mg by SubCUTAneous route every seven (7) days.  diclofenac (VOLTAREN) 1 % gel Apply 4 g to affected area four (4) times daily. Left knee and left leg 5 Each 5    NOVOLIN R REGULAR U-100 INSULN 100 unit/mL injection 7 Units by SubCUTAneous route.  0    NOVOLIN N NPH U-100 INSULIN 100 unit/mL injection 30 Units. 0    folic acid/multivit-min/lutein (CENTRUM SILVER PO) Take 1 Tab by mouth daily.  metFORMIN (GLUCOPHAGE) 1,000 mg tablet Take 1,000 mg by mouth two (2) times daily (with meals).         amLODIPine (NORVASC) 10 mg tablet Take 10 mg by mouth daily.  Cholecalciferol, Vitamin D3, (VITAMIN D) 5,000 unit Tab Take 5,000 Units by mouth daily.  atorvastatin (LIPITOR) 40 mg tablet take 1 tablet by mouth once daily 90 Tab 3     Allergies   Allergen Reactions    Lopid [Gemfibrozil] Other (comments)     elev lfts    Penicillins Shortness of Breath and Nausea and Vomiting        Social History     Tobacco Use    Smoking status: Never Smoker    Smokeless tobacco: Never Used   Vaping Use    Vaping Use: Never used   Substance Use Topics    Alcohol use: Yes     Alcohol/week: 2.0 standard drinks     Types: 1 Glasses of wine, 1 Cans of beer per week     Comment: social glass once a year    Drug use: No        Family History   Problem Relation Age of Onset    Diabetes Father     Hypertension Father     Cancer Sister     Hypertension Sister        Review of Systems    General: negative for fever   Eyes: negative for vision loss   HENT: negative for cold symptoms   Respiratory negative for shortness of breath   Cardiac: negative for chest pain   Vascular negative for foot pain at night    Gastrointestinal: negative for abdominal pain   Genitourinary: negative for dysuria    Endocrine: negative for excessive thirst   Skin: negative for rash   Neurological: negative for paralysis   Psychiatric: negative for depression        Physical Exam:    Visit Vitals  /82 (BP 1 Location: Left upper arm, BP Patient Position: Sitting)   Pulse 61   Ht 5' 3\" (1.6 m)   Wt 163 lb (73.9 kg)   SpO2 98%   BMI 28.87 kg/m²        Constitutional:  Patient is well developed, well nourished, and not distressed. HEENT: atraumatic, normocephalic, wearing a mask. Eyes:   Cunjunctivae clear, no scleral icterus  Cardiovascular:  Normal rate, regular rhythm, normal heart sounds. Pulmonary/Chest: Effort normal and breath sounds normal.  she has no wheezes or no rales. Abdominal:   Soft. No tenderness to palpation.    Extremities: Palpable radial pulse bilaterally. Neurological:  she  is alert and oriented x3 Gait normal. Motor & sensory grossly intact in all 4 limbs. Psych: Appropriate mood and affect. Imaging/Studies:   Carotid duplex 11/23/2021: R ICA with 50-69% stenosis, L ICA with 50-69% stenosis. The L vertebral artery demonstrates proximal stenosis. The L subclavian artery deonstrates > 50% stenosis (344 cm/s PSV). Prior study in 11/2020 demonstrated bilateral ICA with < 50% stenosis, and L vertebral artery proximal stenosis. Normal findings of the L subclavian artery. Impression:  Asymptomatic bilateral ICA stenosis, with progression of disease bilaterally. Asymptomatic L subclavian artery stenosis, with no clinical signs or symptoms of subclavian steal.     Plan:  -Recommend repeat carotid duplex in 6 months. - Will send message to pt PCM regarding recommendation of increasing statin to max dose, in the setting of progressive PAD. -Discussed symptoms of subclavian steal, including syncope and pre-syncope, and advised pt to seek medical care and reach out to our clinic should this occur.     - Return to clinic in 6 mos.        Aryan Hoskins MD  Vascular Surgeon

## 2021-12-22 DIAGNOSIS — I10 ESSENTIAL HYPERTENSION: ICD-10-CM

## 2021-12-22 RX ORDER — CARVEDILOL 25 MG/1
TABLET ORAL
Qty: 180 TABLET | Refills: 3 | Status: SHIPPED | OUTPATIENT
Start: 2021-12-22

## 2021-12-27 ENCOUNTER — TELEPHONE (OUTPATIENT)
Dept: INTERNAL MEDICINE CLINIC | Age: 71
End: 2021-12-27

## 2021-12-27 DIAGNOSIS — S82.899A CLOSED FRACTURE OF ANKLE, UNSPECIFIED LATERALITY, INITIAL ENCOUNTER: Primary | ICD-10-CM

## 2021-12-27 NOTE — TELEPHONE ENCOUNTER
Pt calling again, says Dr. Lennox Maid is on vacation. They said she should call Sports Medicine on [address] but she wants to know what Rd thinks? Shouldn't he refer her there?

## 2021-12-27 NOTE — TELEPHONE ENCOUNTER
pls make appt with sports medicine at [facility] - not sure who the ankle surgeon us there though

## 2021-12-27 NOTE — TELEPHONE ENCOUNTER
Pt aware of message below and verbalized understanding. Will fax referral to [facility]. No further questions or concerns from pt at this time.

## 2021-12-27 NOTE — TELEPHONE ENCOUNTER
Pt asking for a referral to orthopedic   She said she fell and broke her left ankle on xmas morning ,-she was in [address] [place] [address] was seen at [facility] ER and was told she broke it in 3 places and needs immediate surgery , she didn't want to stay ,they wrapped it and put splints on it .  She is asking for a referral to orthopedic

## 2021-12-27 NOTE — TELEPHONE ENCOUNTER
Pt aware of message below and verbalized understanding. Phone number to Danniel Leventhal provided so patient can call and schedule the appointment. No further questions or concerns from pt at this time.

## 2022-01-04 ENCOUNTER — TELEPHONE (OUTPATIENT)
Dept: INTERNAL MEDICINE CLINIC | Age: 72
End: 2022-01-04

## 2022-01-04 ENCOUNTER — OFFICE VISIT (OUTPATIENT)
Dept: INTERNAL MEDICINE CLINIC | Age: 72
End: 2022-01-04
Payer: MEDICARE

## 2022-01-04 VITALS
BODY MASS INDEX: 29.23 KG/M2 | DIASTOLIC BLOOD PRESSURE: 69 MMHG | OXYGEN SATURATION: 98 % | SYSTOLIC BLOOD PRESSURE: 141 MMHG | WEIGHT: 165 LBS | HEART RATE: 62 BPM | RESPIRATION RATE: 16 BRPM | HEIGHT: 63 IN | TEMPERATURE: 97.3 F

## 2022-01-04 DIAGNOSIS — E11.21 TYPE 2 DIABETES WITH NEPHROPATHY (HCC): ICD-10-CM

## 2022-01-04 DIAGNOSIS — Z01.818 PREOP EXAM FOR INTERNAL MEDICINE: Primary | ICD-10-CM

## 2022-01-04 DIAGNOSIS — I10 ESSENTIAL HYPERTENSION: ICD-10-CM

## 2022-01-04 DIAGNOSIS — S82.892G CLOSED FRACTURE OF LEFT ANKLE WITH DELAYED HEALING, SUBSEQUENT ENCOUNTER: ICD-10-CM

## 2022-01-04 PROCEDURE — 1090F PRES/ABSN URINE INCON ASSESS: CPT | Performed by: INTERNAL MEDICINE

## 2022-01-04 PROCEDURE — G8419 CALC BMI OUT NRM PARAM NOF/U: HCPCS | Performed by: INTERNAL MEDICINE

## 2022-01-04 PROCEDURE — 3046F HEMOGLOBIN A1C LEVEL >9.0%: CPT | Performed by: INTERNAL MEDICINE

## 2022-01-04 PROCEDURE — 99214 OFFICE O/P EST MOD 30 MIN: CPT | Performed by: INTERNAL MEDICINE

## 2022-01-04 PROCEDURE — 3017F COLORECTAL CA SCREEN DOC REV: CPT | Performed by: INTERNAL MEDICINE

## 2022-01-04 PROCEDURE — G8754 DIAS BP LESS 90: HCPCS | Performed by: INTERNAL MEDICINE

## 2022-01-04 PROCEDURE — G8536 NO DOC ELDER MAL SCRN: HCPCS | Performed by: INTERNAL MEDICINE

## 2022-01-04 PROCEDURE — G8427 DOCREV CUR MEDS BY ELIG CLIN: HCPCS | Performed by: INTERNAL MEDICINE

## 2022-01-04 PROCEDURE — 1101F PT FALLS ASSESS-DOCD LE1/YR: CPT | Performed by: INTERNAL MEDICINE

## 2022-01-04 PROCEDURE — 2022F DILAT RTA XM EVC RTNOPTHY: CPT | Performed by: INTERNAL MEDICINE

## 2022-01-04 PROCEDURE — G0463 HOSPITAL OUTPT CLINIC VISIT: HCPCS | Performed by: INTERNAL MEDICINE

## 2022-01-04 PROCEDURE — G8399 PT W/DXA RESULTS DOCUMENT: HCPCS | Performed by: INTERNAL MEDICINE

## 2022-01-04 PROCEDURE — G8510 SCR DEP NEG, NO PLAN REQD: HCPCS | Performed by: INTERNAL MEDICINE

## 2022-01-04 PROCEDURE — G8753 SYS BP > OR = 140: HCPCS | Performed by: INTERNAL MEDICINE

## 2022-01-04 RX ORDER — PREDNISOLONE ACETATE 10 MG/ML
SUSPENSION/ DROPS OPHTHALMIC
COMMUNITY
Start: 2021-08-30 | End: 2022-09-20 | Stop reason: ALTCHOICE

## 2022-01-04 RX ORDER — HYDROCODONE BITARTRATE AND ACETAMINOPHEN 5; 300 MG/1; MG/1
TABLET ORAL
COMMUNITY
Start: 2021-12-25 | End: 2022-04-05 | Stop reason: ALTCHOICE

## 2022-01-04 NOTE — PROGRESS NOTES
Tereasa [place] presents with   Chief Complaint   Patient presents with   Garo RODRIGUES [facility]) left ankle ORIF with syndesmosis repair, scheduled 1-7-22            1. \"Have you been to the ER, urgent care clinic since your last visit? Hospitalized since your last visit? \" YES- ankle fracture [facility] (on chart)    2. \"Have you seen or consulted any other health care providers outside of the [address] [place] [address] since your last visit? \" Belén Livingston (on chart)    3. For patients aged 39-70: Has the patient had a colonoscopy? Yes, HM satisfied with blue hyperlink     If the patient is female:    4. For patients aged 41-77: Has the patient had a mammogram within the past 2 years? Yes, HM satisfied with blue hyperlink    5. For patients aged 21-65: Has the patient had a pap smear?  NA based on age or sex

## 2022-01-04 NOTE — TELEPHONE ENCOUNTER
----- Message from Nguyen Galloway sent at 1/4/2022  7:32 AM EST -----  Subject: Message to Provider    QUESTIONS  Information for Provider? Patient is having ankle surgery on 01/07. She   had to cancel her pre op appointment today ,due to not having   transportation. She has scheduled an appointment for 01/06, but wanting to   know if there is anyway she can get an appointment today after 1 pm. Can   you please contact patient with scheduling information?  ---------------------------------------------------------------------------  --------------  [address]  What is the best way for the office to contact you? Do not leave any   message, patient will call back for answer  Preferred Call Back Phone Number? [phone]  ---------------------------------------------------------------------------  --------------  SCRIPT ANSWERS  Relationship to Patient?  Self

## 2022-01-04 NOTE — TELEPHONE ENCOUNTER
Pt asking for pre opt appt today after 1 pm. Can she have the 1pm, it is listed as same day or virtual?

## 2022-01-05 NOTE — PROGRESS NOTES
70 y.o. BLACK/ female who presents for evaluation. She will be undergoing surgery for trimalleolar fx of left ankle by Dr Madhu Marti    She was visiting family outside of [place] on [place] day and was going down to the basement but then lost her step and fell down several steps. She was seen at [facility] and was splinted and she went home as she wanted to have surgery here. Reports no cardiovascular complaints. bp running in the 120-130s over 70s reliably on current regimen. She continues to see Dr Angelica Tomas. The CGM gives her readings from 80-mid 200s. Denies polyuria, polydipsia, nocturia, vision change. No GI or Gu complaints. LAST MEDICARE WELLNESS EXAM: 12/29/15, 6/27/17, 7/16/18, 9/19/19, 9/14/20, 9/9/21    Past Medical History:   Diagnosis Date    Allergic rhinitis     Diabetes ([facility] [address]     Dr Lenin Moran GERD (gastroesophageal reflux disease)     H/O cardiovascular stress test     thallium neg ef 70% (3/12);  Dr Maggie Bradley; NST neg, ef 76% (2018)    H/O echocardiogram     conc lvh, ef 60%, DD (3/12); nl lv, ef 68%, conc lvh, no wma, mild dd, no valvular abn, neg bubble study (11/17 - [facility]); nl lv, ef 72%, no wma, gr 1 dd, nl valves (8/18)    Hyperlipidemia     Hypertension     Hypovitaminosis D     Nephrolithiasis 06/2016    Dr Precilla Aase; right ureteral stone s/p EWSL    Obesity     W - 12/19    TIA (transient ischemic attack) 04/2018     Past Surgical History:   Procedure Laterality Date    HX BREAST BIOPSY  2009    negative    HX COLONOSCOPY      Dr Ninfa Torres 12/13 neg    HX ORTHOPAEDIC      DEXA t score 1.10 spine, 1.10 hip (11/13)    HX UROLOGICAL  06/10/2016    RIGHT ESWL Dr. Guanaco Case  11/2017    mri showed mod periventricular wm changes    MO CARDIAC SURG PROCEDURE UNLIST  01/2018    holter few pac/pvc, 4 beat run svt rate 156    VASCULAR SURGERY PROCEDURE UNLIST  11/2017    BICA<50%, RIGHT vertebral art obst    VASCULAR SURGERY PROCEDURE UNLIST  12/2017    CTA head/neck showed no aneurysm, <50% right intracranial ICA, diminutive right vertebral art    VASCULAR SURGERY PROCEDURE UNLIST  04/2018    RABI 1.0/LABI  1.1    VASCULAR SURGERY PROCEDURE UNLIST  04/2018    MRA right vert artery not visualized due to occlusion/stenosis, left patent, prox PAIGE 50-60% stenosis    VASCULAR SURGERY PROCEDURE UNLIST  10/2019    CTA showed 32% PAIGE, 66% prox LICA, mild RSCA sten, mod LSCA stenosis; Dr Marylou Saldaña History     Socioeconomic History    Marital status:      Spouse name: Not on file    Number of children: Not on file    Years of education: Not on file    Highest education level: Not on file   Occupational History    Occupation: retired teacher [place]   Tobacco Use    Smoking status: Never Smoker    Smokeless tobacco: Never Used   Vaping Use    Vaping Use: Never used   Substance and Sexual Activity    Alcohol use: Yes     Alcohol/week: 2.0 standard drinks     Types: 1 Glasses of wine, 1 Cans of beer per week     Comment: social glass once a year    Drug use: No    Sexual activity: Not on file   Other Topics Concern    Not on file   Social History Narrative    Not on file     Social Determinants of Health     Financial Resource Strain:     Difficulty of Paying Living Expenses: Not on file   Food Insecurity:     Worried About Running Out of Food in the Last Year: Not on file    Julia of Food in the Last Year: Not on file   Transportation Needs:     Lack of Transportation (Medical): Not on file    Lack of Transportation (Non-Medical):  Not on file   Physical Activity:     Days of Exercise per Week: Not on file    Minutes of Exercise per Session: Not on file   Stress:     Feeling of Stress : Not on file   Social Connections:     Frequency of Communication with Friends and Family: Not on file    Frequency of Social Gatherings with Friends and Family: Not on file   Rene Attends Yarsani Services: Not on file    Active Member of Clubs or Organizations: Not on file    Attends Club or Organization Meetings: Not on file    Marital Status: Not on file   Intimate Partner Violence:     Fear of Current or Ex-Partner: Not on file    Emotionally Abused: Not on file    Physically Abused: Not on file    Sexually Abused: Not on file   Housing Stability:     Unable to Pay for Housing in the Last Year: Not on file    Number of [place] in the Last Year: Not on file    Unstable Housing in the Last Year: Not on file     Allergies   Allergen Reactions    Lopid [Gemfibrozil] Other (comments)     elev lfts    Penicillins Shortness of Breath and Nausea and Vomiting       Current Outpatient Medications   Medication Sig    HYDROcodone-acetaminophen (XODOL) 5-300 mg tablet hydrocodone 5 mg-acetaminophen 300 mg tablet    prednisoLONE acetate (PRED FORTE) 1 % ophthalmic suspension prednisolone acetate 1 % eye drops,suspension   INSTILL 1 DROP INTO LEFT EYE 4 TIMES A DAY. HOLD FOR USE UNTIL AFTER PROCEDURE HAS BEEN COMPLETED.  carvediloL (COREG) 25 mg tablet take 1 tablet by mouth twice a day with meals    fluticasone propionate (FLONASE) 50 mcg/actuation nasal spray 2 sprays each nostril daily    BD Veo Insulin Syringe UF 0.3 mL 31 gauge x 15/64\" syrg USE four times a day DX.E11.65    clopidogreL (PLAVIX) 75 mg tab take 1 tablet by mouth once daily    atorvastatin (LIPITOR) 40 mg tablet take 1 tablet by mouth once daily    olmesartan (BENICAR) 40 mg tablet     exenatide microspheres (Bydureon) 2 mg/0.65 mL pnij 2 mg by SubCUTAneous route every seven (7) days.  diclofenac (VOLTAREN) 1 % gel Apply 4 g to affected area four (4) times daily. Left knee and left leg    NOVOLIN R REGULAR U-100 INSULN 100 unit/mL injection 7 Units by SubCUTAneous route.  NOVOLIN N NPH U-100 INSULIN 100 unit/mL injection 30 Units.     folic acid/multivit-min/lutein (CENTRUM SILVER PO) Take 1 Tab by mouth daily.  metFORMIN (GLUCOPHAGE) 1,000 mg tablet Take 1,000 mg by mouth two (2) times daily (with meals).  amLODIPine (NORVASC) 10 mg tablet Take 10 mg by mouth daily.  Cholecalciferol, Vitamin D3, (VITAMIN D) 5,000 unit Tab Take 5,000 Units by mouth daily. No current facility-administered medications for this visit. REVIEW OF SYSTEMS: gyn>5 yrs, mammo 9/19, DEXA 11/13, sees Dr Christina Otto, no podiatry, colo 12/13 Dr Farrah Goodwin  Ophtho - no vision change or eye pain  Oral - no mouth pain, tongue or tooth problems  Ears - no hearing loss, ear pain, fullness, no swallowing problems  Cardiac - no CP, PND, orthopnea, edema, palpitations or syncope  Chest - no breast masses  Resp - no wheezing, chronic coughing, dyspnea  GI - no heartburn, nausea, vomiting, change in bowel habits, bleeding, hemorrhoids  Urinary - no dysuria, hematuria, flank pain, urgency, frequency  Genitals - no genital lesions, discharge, masses, ulceration, warts  Ortho - no swelling, dec ROM, myalgias    Visit Vitals  BP (!) 141/69   Pulse 62   Temp 97.3 °F (36.3 °C) (Temporal)   Resp 16   Ht 5' 3\" (1.6 m)   Wt 165 lb (74.8 kg)   SpO2 98%   BMI 29.23 kg/m²     A&O x3  Affect is appropriate. Mood stable  No apparent distress  Anicteric, no JVD, adenopathy or thyromegaly. No carotid bruits or radiated murmur  Lungs clear to auscultation, no wheezes or rales  Heart showed regular rate and rhythm. No murmur, rubs, gallops  Abdomen soft nontender, no hepatosplenomegaly or masses.    Left ankle is splinted    LABS  From 2/12 showed   gluc 330, cr 0.93, gfr 77,  alt 30, hba1c 11.2,                   chol 199, tg 366, hdl 35, ldl-c 67,   wbc 7.1, hb 15.2, plt 273,  tsh 2.20  From 6/12 showed                   hba1c 8.2,   ldl-p 1018, chol 112, tg 95,   hdl 32, ldl-c 61,   umar 1.5  From 12/12 showed                   hba1c 8.9,   ldl-p 1486, chol 132, tg 109, hdl 37, ldl-c 73  From 6/13 showed   gluc 128, cr 0.91, gfr 68,  alt 13, hba1c 7.8,                  chol 115, tg 157, hdl 28, ldl-c 56  From 12/13 showed                   hba1c 7.5,                  wbc 7.5, hb 13.2, plt 297,  vit d 69.2  From 6/14 showed   gluc 171, cr 0.85, gfr>60, alt<5,        chol 134, tg 165, hdl 26, ldl-c 73  From 12/14 showed        hba1c 9.2,   umar 4       chol 151, tg 256, hdl 33, ldl-c 67,     From 3/15 showed       hba1c 7.3,   umar neg           ldl-c 82,   From 7/15 showed   gluc 130, cr 1.16, gfr 49,  alt 15, hba1c 7.5,       chol 128, tg 271, hdl 29, ldl-c 45  From 12/15 showed        hba1c 9.6,   umar 7.0    chol 160, tg 218, hdl 33, ldl-c 83,     From 2/16 showed   gluc 117, cr 1.15, gfr 58,  alt 18, hba1c 7.9,   umar 4.7    chol 112, tg 184, hdl 30, ldl-c 45,   ,                      vit d 45.3, uric 7.2  From 6/16 showed   gluc 98      hba1c 7.7,       chol 143, tg 178, hdl 31, ldl-c 76  From 12/16 showed gluc 170, cr 1.11, gfr 60,    hba1c 8.9,   umar 4.0  From 6/17 showed   gluc 148, cr 1.06, gfr>60,    hba1c 8.1,       chol 192, tg 202, hdl 39, ldl-c 113, wbc 6.3, hb 13.8, plt 281  From 1/18 showed   gluc 154, cr 1.08, gfr>60, alt 11, hba1c 8.0,   umar 16.0,                              vit d 40.7  From 4/18 showed   gluc 140, cr 0.90, gfr>60,    hba1c 8.5,       chol 216, tg 356, hdl 31, ldl-c na    wbc 7.0, hb 13.1, plt 244, ck/trop neg  From 5/18 showed        hba1c 8.1  From 4/19 showed   gluc 237, cr 0.98, gfr 69,  alt 14,        chol 121, tg 135, hdl 29, ldl-c 65  From 9/19 showed   gluc 218, cr 1.11, gfr 59,  alt 20, hba1c 10.8, umar 17     chol 110, tg 127, hdl 30, ldl-c 55,   wbc 6.9, hb 13.5, plt 260  From 3/20 showed   gluc 170, cr 1.04, gfr>60, alt 18, hba1c 7.7,       chol 121, tg 94,   hdl 36, ldl-c 66,   wbc 6.0, hb 13.6, plt 288  From 9/20 showed        hba1c 7.5,  umar 16,             vit d 69.3  From 3/21 showed   gluc 169, cr 0.98, gfr>60, alt 16, hba1c 8.1,       chol 151, tg 125, hdl 35, ldl-c 91,   wbc 7.4, hb 13.6, plt 300  From 9/21 showed        hba1c 8.8,  umar 24    We reviewed the patient's labs from the last several visits to point out trends in the numbers    PREOP  From 12/21 showed wbc 7.6, hb 12.2, plt 303, vit d 86.5  EKG showed nsr rate 70, leftward axis, minor non spec t wave changes  IMPRESSION:   1. Trimalleolar fracture with displacement as described above. Patient Active Problem List   Diagnosis Code    Hypovitaminosis D E55.9    Dyslipidemia E78.5    Essential hypertension I10    GERD without esophagitis K21.9    Advance directive discussed with patient Z70.80    Nephrolithiasis s/p EWSL Dr Jeannie Trinh 6/16 N20.0    TIA (transient ischemic attack) G45.9    Symptomatic carotid artery stenosis I65.29    Type 2 diabetes with nephropathy (HCC) E11.21    Overweight with body mass index (BMI) of 25 to 25.9 in adult E66.3, Z68.25    Carotid stenosis, asymptomatic, bilateral I65.23    Subclavian artery stenosis, left (HCC) I77.1     Assessment and plan:  1. Diabetes. F/u w Dr Danae Jay and Dr Rodrigo Dockery. 2.  Dyslipidemia. At target on lipitor   3. Hypertension. Continue coreg, arb, amlo  4. GERD. PPI and avoidance measures  5. Hypovit d.  supplementation   6. TIA. Continue current regimen. 7.  Nephrolithiasis. F/U Dr Jeannie Trinh  8. Vascular. F/U Dr Idania Jo  9. Ortho. She is felt to be average risk for the planned procedure, no contraindications noted. Routine postop prophylaxis per protocol. If needed, cover with correction insulin per hospital protocol. Hold plavix preop 5d        RTC 3/22    Above conditions discussed at length and patient vocalized understanding. All questions answered to patient satisfaction        ICD-10-CM ICD-9-CM    1. Preop exam for internal medicine  Z01.818 V72.83    2. Essential hypertension  I10 401.9    3. Type 2 diabetes with nephropathy (HCC)  E11.21 250.40      583.81    4.  Closed fracture of left ankle with delayed healing, subsequent encounter S82.892G V54.19

## 2022-03-01 NOTE — PROGRESS NOTES
70 y.o. BLACK/ female who presents for evaluation. Sister was present for the evaluation    She successfully underwent surgery for trimalleolar fx of left ankle by Dr Sondra Guidry. She was finally cleared for PT earlier this month. She is weight bearing and walking w a walker short distances    No cardiovascular complaints. bp running in the 120-130s over 70s reliably on current regimen. She continues to see Dr Danae Jay and has appt 5/3/22. Her sugars have been running lower paradoxically with her being less active but no overt hypoglycemia and no adjustments to her regimen   Denies polyuria, polydipsia, nocturia, vision change. No GI or Gu complaints. She wants to change [facility] provider back to Dr Sea Esqueda: 12/29/15, 6/27/17, 7/16/18, 9/19/19, 9/14/20, 9/9/21    Past Medical History:   Diagnosis Date    Allergic rhinitis     Carotid artery disease ([facility] [address] 2017    Dr Esteban Ortega    Diabetes [facility])     Dr Cassidy Hood GERD (gastroesophageal reflux disease)     H/O cardiovascular stress test     thallium neg ef 70% (3/12);  Dr George James; NST neg, ef 76% (2018)    H/O echocardiogram     conc lvh, ef 60%, DD (3/12); nl lv, ef 68%, conc lvh, no wma, mild dd, no valvular abn, neg bubble study (11/17 - [facility]); nl lv, ef 72%, no wma, gr 1 dd, nl valves (8/18)    Hyperlipidemia     Hypertension     Hypovitaminosis D     Nephrolithiasis 06/2016    Dr Jeannie Trinh; right ureteral stone s/p EWSL    Obesity     W - 12/19    TIA (transient ischemic attack) 04/2018     Past Surgical History:   Procedure Laterality Date    HX ANKLE FRACTURE TX Left 01/2022    Dr Jaden Rowley HX BREAST BIOPSY  2009    negative    HX COLONOSCOPY      Dr Luis Fontaine 12/13 neg    HX ORTHOPAEDIC      DEXA t score 1.10 spine, 1.10 hip (11/13)    HX UROLOGICAL  06/10/2016    RIGHT ESWL Dr. Kaleigh Elias  11/2017    mri showed mod periventricular wm changes    GA CARDIAC SURG PROCEDURE UNLIST  01/2018    holter few pac/pvc, 4 beat run svt rate 156    VASCULAR SURGERY PROCEDURE UNLIST  11/2017    BICA<50%, RIGHT vertebral art obst    VASCULAR SURGERY PROCEDURE UNLIST  12/2017    CTA head/neck showed no aneurysm, <50% right intracranial ICA, diminutive right vertebral art    VASCULAR SURGERY PROCEDURE UNLIST  04/2018    RABI 1.0/LABI  1.1    VASCULAR SURGERY PROCEDURE UNLIST  04/2018    MRA right vert artery not visualized due to occlusion/stenosis, left patent, prox PAIGE 50-60% stenosis    VASCULAR SURGERY PROCEDURE UNLIST  10/2019    CTA showed 02% PAIGE, 90% prox LICA, mild RSCA sten, mod LSCA stenosis; Dr Michelle Martell History     Socioeconomic History    Marital status:      Spouse name: Not on file    Number of children: Not on file    Years of education: Not on file    Highest education level: Not on file   Occupational History    Occupation: retired teacher [employer]   Tobacco Use    Smoking status: Never Smoker    Smokeless tobacco: Never Used   Vaping Use    Vaping Use: Never used   Substance and Sexual Activity    Alcohol use: Yes     Alcohol/week: 2.0 standard drinks     Types: 1 Glasses of wine, 1 Cans of beer per week     Comment: social glass once a year    Drug use: No    Sexual activity: Not on file   Other Topics Concern    Not on file   Social History Narrative    Not on file     Social Determinants of Health     Financial Resource Strain:     Difficulty of Paying Living Expenses: Not on file   Food Insecurity:     Worried About Running Out of Food in the Last Year: Not on file    Julia of Food in the Last Year: Not on file   Transportation Needs:     Lack of Transportation (Medical): Not on file    Lack of Transportation (Non-Medical):  Not on file   Physical Activity:     Days of Exercise per Week: Not on file    Minutes of Exercise per Session: Not on file   Stress:     Feeling of Stress : Not on file   Social Connections:     Frequency of Communication with Friends and Family: Not on file    Frequency of Social Gatherings with Friends and Family: Not on file    Attends [employer] Services: Not on file    Active Member of Clubs or Organizations: Not on file    Attends Club or Organization Meetings: Not on file    Marital Status: Not on file   Intimate Partner Violence:     Fear of Current or Ex-Partner: Not on file    Emotionally Abused: Not on file    Physically Abused: Not on file    Sexually Abused: Not on file   Housing Stability:     Unable to Pay for Housing in the Last Year: Not on file    Number of [place] in the Last Year: Not on file    Unstable Housing in the Last Year: Not on file     Allergies   Allergen Reactions    Lopid [Gemfibrozil] Other (comments)     elev lfts    Penicillins Shortness of Breath and Nausea and Vomiting       Current Outpatient Medications   Medication Sig    prednisoLONE acetate (PRED FORTE) 1 % ophthalmic suspension prednisolone acetate 1 % eye drops,suspension   INSTILL 1 DROP INTO LEFT EYE 4 TIMES A DAY. HOLD FOR USE UNTIL AFTER PROCEDURE HAS BEEN COMPLETED.  carvediloL (COREG) 25 mg tablet take 1 tablet by mouth twice a day with meals    fluticasone propionate (FLONASE) 50 mcg/actuation nasal spray 2 sprays each nostril daily    BD Veo Insulin Syringe UF 0.3 mL 31 gauge x 15/64\" syrg USE four times a day DX.E11.65    clopidogreL (PLAVIX) 75 mg tab take 1 tablet by mouth once daily    atorvastatin (LIPITOR) 40 mg tablet take 1 tablet by mouth once daily    olmesartan (BENICAR) 40 mg tablet     exenatide microspheres (Bydureon) 2 mg/0.65 mL pnij 2 mg by SubCUTAneous route every seven (7) days.  diclofenac (VOLTAREN) 1 % gel Apply 4 g to affected area four (4) times daily. Left knee and left leg    NOVOLIN R REGULAR U-100 INSULN 100 unit/mL injection 7 Units by SubCUTAneous route.  NOVOLIN N NPH U-100 INSULIN 100 unit/mL injection 30 Units.     folic acid/multivit-min/lutein (CENTRUM SILVER PO) Take 1 Tab by mouth daily.  metFORMIN (GLUCOPHAGE) 1,000 mg tablet Take 1,000 mg by mouth two (2) times daily (with meals).  amLODIPine (NORVASC) 10 mg tablet Take 10 mg by mouth daily.  Cholecalciferol, Vitamin D3, (VITAMIN D) 5,000 unit Tab Take 5,000 Units by mouth daily.  HYDROcodone-acetaminophen (XODOL) 5-300 mg tablet hydrocodone 5 mg-acetaminophen 300 mg tablet (Patient not taking: Reported on 3/14/2022)     No current facility-administered medications for this visit. REVIEW OF SYSTEMS: mammo 9/21, DEXA 11/13, sees Dr Bill Mann, no podiatry, colo 12/13 Dr Ninfa Torres  Ophtho - no vision change or eye pain  Oral - no mouth pain, tongue or tooth problems  Ears - no hearing loss, ear pain, fullness, no swallowing problems  Cardiac - no CP, PND, orthopnea, edema, palpitations or syncope  Chest - no breast masses  Resp - no wheezing, chronic coughing, dyspnea  GI - no heartburn, nausea, vomiting, change in bowel habits, bleeding, hemorrhoids  Urinary - no dysuria, hematuria, flank pain, urgency, frequency  Genitals - no genital lesions, discharge, masses, ulceration, warts  Ortho - no swelling, dec ROM, myalgias    Visit Vitals  /60   Pulse 63   Temp 97.2 °F (36.2 °C) (Temporal)   Resp 16   Ht 5' 3\" (1.6 m)   Wt 165 lb (74.8 kg)   SpO2 97%   BMI 29.23 kg/m²     A&O x3  Affect is appropriate. Mood stable  No apparent distress  Anicteric, no JVD, adenopathy or thyromegaly. No carotid bruits or radiated murmur  Lungs clear to auscultation, no wheezes or rales  Heart showed regular rate and rhythm. No murmur, rubs, gallops  Abdomen soft nontender, no hepatosplenomegaly or masses.    Left ankle is booted; RLE without c/c/e, 1+ pulses    LABS  From 2/12 showed   gluc 330, cr 0.93, gfr 77,  alt 30, hba1c 11.2,                   chol 199, tg 366, hdl 35, ldl-c 67,   wbc 7.1, hb 15.2, plt 273,  tsh 2.20  From 6/12 showed                   hba1c 8.2, ldl-p 1018, chol 112, tg 95,   hdl 32, ldl-c 61,   umar 1.5  From 12/12 showed                   hba1c 8.9,   ldl-p 1486, chol 132, tg 109, hdl 37, ldl-c 73  From 6/13 showed   gluc 128, cr 0.91, gfr 68,  alt 13, hba1c 7.8,                  chol 115, tg 157, hdl 28, ldl-c 56  From 12/13 showed                   hba1c 7.5,                  wbc 7.5, hb 13.2, plt 297,  vit d 69.2  From 6/14 showed   gluc 171, cr 0.85, gfr>60, alt<5,        chol 134, tg 165, hdl 26, ldl-c 73  From 12/14 showed        hba1c 9.2,   umar 4       chol 151, tg 256, hdl 33, ldl-c 67,     From 3/15 showed       hba1c 7.3,   umar neg           ldl-c 82,   From 7/15 showed   gluc 130, cr 1.16, gfr 49,  alt 15, hba1c 7.5,       chol 128, tg 271, hdl 29, ldl-c 45  From 12/15 showed        hba1c 9.6,   umar 7.0    chol 160, tg 218, hdl 33, ldl-c 83,     From 2/16 showed   gluc 117, cr 1.15, gfr 58,  alt 18, hba1c 7.9,   umar 4.7    chol 112, tg 184, hdl 30, ldl-c 45,   ,                      vit d 45.3, uric 7.2  From 6/16 showed   gluc 98      hba1c 7.7,       chol 143, tg 178, hdl 31, ldl-c 76  From 12/16 showed gluc 170, cr 1.11, gfr 60,    hba1c 8.9,   umar 4.0  From 6/17 showed   gluc 148, cr 1.06, gfr>60,    hba1c 8.1,       chol 192, tg 202, hdl 39, ldl-c 113, wbc 6.3, hb 13.8, plt 281  From 1/18 showed   gluc 154, cr 1.08, gfr>60, alt 11, hba1c 8.0,   umar 16.0,                              vit d 40.7  From 4/18 showed   gluc 140, cr 0.90, gfr>60,    hba1c 8.5,       chol 216, tg 356, hdl 31, ldl-c na    wbc 7.0, hb 13.1, plt 244, ck/trop neg  From 5/18 showed        hba1c 8.1  From 4/19 showed   gluc 237, cr 0.98, gfr 69,  alt 14,        chol 121, tg 135, hdl 29, ldl-c 65  From 9/19 showed   gluc 218, cr 1.11, gfr 59,  alt 20, hba1c 10.8, umar 17     chol 110, tg 127, hdl 30, ldl-c 55,   wbc 6.9, hb 13.5, plt 260  From 3/20 showed   gluc 170, cr 1.04, gfr>60, alt 18, hba1c 7.7,       chol 121, tg 94,   hdl 36, ldl-c 66,   wbc 6.0, hb 13.6, plt 288  From 9/20 showed        hba1c 7.5,  umar 16,             vit d 69.3  From 3/21 showed   gluc 169, cr 0.98, gfr>60, alt 16, hba1c 8.1,       chol 151, tg 125, hdl 35, ldl-c 91,   wbc 7.4, hb 13.6, plt 300  From 9/21 showed        hba1c 8.8,  umar 24  From 12/21 showed        hba1c 8.4                  wbc 7.6, hb 12.2, plt 303, vit d 86.5     Results for orders placed or performed during the hospital encounter of 03/04/22   CBC W/O DIFF   Result Value Ref Range    WBC 7.5 4.6 - 13.2 K/uL    RBC 4.74 4.20 - 5.30 M/uL    HGB 12.3 12.0 - 16.0 g/dL    HCT 40.1 35.0 - 45.0 %    MCV 84.6 78.0 - 100.0 FL    MCH 25.9 24.0 - 34.0 PG    MCHC 30.7 (L) 31.0 - 37.0 g/dL    RDW 13.3 11.6 - 14.5 %    PLATELET 150 911 - 355 K/uL    MPV 12.2 (H) 9.2 - 11.8 FL    NRBC 0.0 0  WBC    ABSOLUTE NRBC 0.00 0.00 - 6.27 K/uL   METABOLIC PANEL, COMPREHENSIVE   Result Value Ref Range    Sodium 140 136 - 145 mmol/L    Potassium 3.5 3.5 - 5.5 mmol/L    Chloride 104 100 - 111 mmol/L    CO2 30 21 - 32 mmol/L    Anion gap 6 3.0 - 18 mmol/L    Glucose 161 (H) 74 - 99 mg/dL    BUN 11 7.0 - 18 MG/DL    Creatinine 0.94 0.6 - 1.3 MG/DL    BUN/Creatinine ratio 12 12 - 20      GFR est AA >60 >60 ml/min/1.73m2    GFR est non-AA 59 (L) >60 ml/min/1.73m2    Calcium 9.2 8.5 - 10.1 MG/DL    Bilirubin, total 0.4 0.2 - 1.0 MG/DL    ALT (SGPT) 21 13 - 56 U/L    AST (SGOT) 10 10 - 38 U/L    Alk.  phosphatase 119 (H) 45 - 117 U/L    Protein, total 6.7 6.4 - 8.2 g/dL    Albumin 3.2 (L) 3.4 - 5.0 g/dL    Globulin 3.5 2.0 - 4.0 g/dL    A-G Ratio 0.9 0.8 - 1.7     HEMOGLOBIN A1C WITH EAG   Result Value Ref Range    Hemoglobin A1c 7.2 (H) 4.2 - 5.6 %    Est. average glucose 160 mg/dL   LIPID PANEL   Result Value Ref Range    LIPID PROFILE          Cholesterol, total 108 <200 MG/DL    Triglyceride 88 <150 MG/DL    HDL Cholesterol 38 (L) 40 - 60 MG/DL    LDL, calculated 52.4 0 - 100 MG/DL    VLDL, calculated 17.6 MG/DL    CHOL/HDL Ratio 2.8 0 - 5.0       We reviewed the patient's labs from the last several visits to point out trends in the numbers          Patient Active Problem List   Diagnosis Code    Hypovitaminosis D E55.9    Dyslipidemia E78.5    Essential hypertension I10    GERD without esophagitis K21.9    Advance directive discussed with patient Z70.80    Nephrolithiasis s/p EWSL Dr Tennis [place] 6/16 N20.0    TIA (transient ischemic attack) G45.9    Symptomatic carotid artery stenosis I65.29    Type 2 diabetes with nephropathy ([facility] [address] E11.21    Overweight with body mass index (BMI) of 25 to 25.9 in adult E66.3, Z68.25    Subclavian artery stenosis, left (HCC) I77.1     Assessment and plan:  1. Diabetes. F/u w Dr Bang Maddox and Dr Jesus Manuel Henson. 2.  Dyslipidemia. At target on lipitor   3. Hypertension. Continue coreg, arb, amlo  4. GERD. PPI and avoidance measures  5. Hypovit d.  supplementation   6. TIA. Continue current regimen. 7.  Nephrolithiasis. F/U Dr Chalo Zayas  8. Vascular. F/U Dr Jessika Martinez  9. Ortho. Per Dr Liudmila Horner      RTC 9/22    Above conditions discussed at length and patient vocalized understanding. All questions answered to patient satisfaction        ICD-10-CM ICD-9-CM    1. Essential hypertension  I10 401.9    2. Subclavian artery stenosis, left (HCC)  I77.1 447.1 REFERRAL TO VASCULAR SURGERY   3. Symptomatic stenosis of right carotid artery  I65.21 433.10 REFERRAL TO VASCULAR SURGERY   4. TIA (transient ischemic attack)  G45.9 435.9    5. Type 2 diabetes with nephropathy (HCC)  E11.21 250.40 MICROALBUMIN, UR, RAND W/ MICROALB/CREAT RATIO     583.81 HEMOGLOBIN A1C WITH EAG   6.  Dyslipidemia  E78.5 272.4

## 2022-03-04 ENCOUNTER — APPOINTMENT (OUTPATIENT)
Dept: INTERNAL MEDICINE CLINIC | Age: 72
End: 2022-03-04

## 2022-03-04 ENCOUNTER — HOSPITAL ENCOUNTER (OUTPATIENT)
Dept: LAB | Age: 72
Discharge: HOME | End: 2022-03-04
Payer: MEDICARE

## 2022-03-04 DIAGNOSIS — E78.5 DYSLIPIDEMIA: ICD-10-CM

## 2022-03-04 DIAGNOSIS — E11.21 TYPE 2 DIABETES WITH NEPHROPATHY (HCC): ICD-10-CM

## 2022-03-04 LAB
ALBUMIN SERPL-MCNC: 3.2 G/DL (ref 3.4–5)
ALBUMIN/GLOB SERPL: 0.9 {RATIO} (ref 0.8–1.7)
ALP SERPL-CCNC: 119 U/L (ref 45–117)
ALT SERPL-CCNC: 21 U/L (ref 13–56)
ANION GAP SERPL CALC-SCNC: 6 MMOL/L (ref 3–18)
AST SERPL-CCNC: 10 U/L (ref 10–38)
BILIRUB SERPL-MCNC: 0.4 MG/DL (ref 0.2–1)
BUN SERPL-MCNC: 11 MG/DL (ref 7–18)
BUN/CREAT SERPL: 12 (ref 12–20)
CALCIUM SERPL-MCNC: 9.2 MG/DL (ref 8.5–10.1)
CHLORIDE SERPL-SCNC: 104 MMOL/L (ref 100–111)
CHOLEST SERPL-MCNC: 108 MG/DL
CO2 SERPL-SCNC: 30 MMOL/L (ref 21–32)
CREAT SERPL-MCNC: 0.94 MG/DL (ref 0.6–1.3)
ERYTHROCYTE [DISTWIDTH] IN BLOOD BY AUTOMATED COUNT: 13.3 % (ref 11.6–14.5)
EST. AVERAGE GLUCOSE BLD GHB EST-MCNC: 160 MG/DL
GLOBULIN SER CALC-MCNC: 3.5 G/DL (ref 2–4)
GLUCOSE SERPL-MCNC: 161 MG/DL (ref 74–99)
HBA1C MFR BLD: 7.2 % (ref 4.2–5.6)
HCT VFR BLD AUTO: 40.1 % (ref 35–45)
HDLC SERPL-MCNC: 38 MG/DL (ref 40–60)
HDLC SERPL: 2.8 {RATIO} (ref 0–5)
HGB BLD-MCNC: 12.3 G/DL (ref 12–16)
LDLC SERPL CALC-MCNC: 52.4 MG/DL (ref 0–100)
LIPID PROFILE,FLP: ABNORMAL
MCH RBC QN AUTO: 25.9 PG (ref 24–34)
MCHC RBC AUTO-ENTMCNC: 30.7 G/DL (ref 31–37)
MCV RBC AUTO: 84.6 FL (ref 78–100)
NRBC # BLD: 0 K/UL (ref 0–0.01)
NRBC BLD-RTO: 0 PER 100 WBC
PLATELET # BLD AUTO: 255 K/UL (ref 135–420)
PMV BLD AUTO: 12.2 FL (ref 9.2–11.8)
POTASSIUM SERPL-SCNC: 3.5 MMOL/L (ref 3.5–5.5)
PROT SERPL-MCNC: 6.7 G/DL (ref 6.4–8.2)
RBC # BLD AUTO: 4.74 M/UL (ref 4.2–5.3)
SODIUM SERPL-SCNC: 140 MMOL/L (ref 136–145)
TRIGL SERPL-MCNC: 88 MG/DL (ref ?–150)
VLDLC SERPL CALC-MCNC: 17.6 MG/DL
WBC # BLD AUTO: 7.5 K/UL (ref 4.6–13.2)

## 2022-03-04 PROCEDURE — 85027 COMPLETE CBC AUTOMATED: CPT

## 2022-03-04 PROCEDURE — 80061 LIPID PANEL: CPT

## 2022-03-04 PROCEDURE — 83036 HEMOGLOBIN GLYCOSYLATED A1C: CPT

## 2022-03-04 PROCEDURE — 36415 COLL VENOUS BLD VENIPUNCTURE: CPT

## 2022-03-04 PROCEDURE — 80053 COMPREHEN METABOLIC PANEL: CPT

## 2022-03-14 ENCOUNTER — OFFICE VISIT (OUTPATIENT)
Dept: INTERNAL MEDICINE CLINIC | Age: 72
End: 2022-03-14
Payer: MEDICARE

## 2022-03-14 VITALS
HEIGHT: 63 IN | SYSTOLIC BLOOD PRESSURE: 120 MMHG | TEMPERATURE: 97.2 F | WEIGHT: 165 LBS | RESPIRATION RATE: 16 BRPM | BODY MASS INDEX: 29.23 KG/M2 | DIASTOLIC BLOOD PRESSURE: 60 MMHG | HEART RATE: 63 BPM | OXYGEN SATURATION: 97 %

## 2022-03-14 DIAGNOSIS — E11.21 TYPE 2 DIABETES WITH NEPHROPATHY (HCC): ICD-10-CM

## 2022-03-14 DIAGNOSIS — I65.21 SYMPTOMATIC STENOSIS OF RIGHT CAROTID ARTERY: ICD-10-CM

## 2022-03-14 DIAGNOSIS — I77.1 SUBCLAVIAN ARTERY STENOSIS, LEFT (HCC): ICD-10-CM

## 2022-03-14 DIAGNOSIS — E78.5 DYSLIPIDEMIA: ICD-10-CM

## 2022-03-14 DIAGNOSIS — I10 ESSENTIAL HYPERTENSION: Primary | ICD-10-CM

## 2022-03-14 DIAGNOSIS — G45.9 TIA (TRANSIENT ISCHEMIC ATTACK): ICD-10-CM

## 2022-03-14 PROBLEM — I65.23 CAROTID STENOSIS, ASYMPTOMATIC, BILATERAL: Status: RESOLVED | Noted: 2021-12-02 | Resolved: 2022-03-14

## 2022-03-14 PROCEDURE — G8399 PT W/DXA RESULTS DOCUMENT: HCPCS | Performed by: INTERNAL MEDICINE

## 2022-03-14 PROCEDURE — G8536 NO DOC ELDER MAL SCRN: HCPCS | Performed by: INTERNAL MEDICINE

## 2022-03-14 PROCEDURE — G8754 DIAS BP LESS 90: HCPCS | Performed by: INTERNAL MEDICINE

## 2022-03-14 PROCEDURE — G8752 SYS BP LESS 140: HCPCS | Performed by: INTERNAL MEDICINE

## 2022-03-14 PROCEDURE — G8510 SCR DEP NEG, NO PLAN REQD: HCPCS | Performed by: INTERNAL MEDICINE

## 2022-03-14 PROCEDURE — 1101F PT FALLS ASSESS-DOCD LE1/YR: CPT | Performed by: INTERNAL MEDICINE

## 2022-03-14 PROCEDURE — 3017F COLORECTAL CA SCREEN DOC REV: CPT | Performed by: INTERNAL MEDICINE

## 2022-03-14 PROCEDURE — G9899 SCRN MAM PERF RSLTS DOC: HCPCS | Performed by: INTERNAL MEDICINE

## 2022-03-14 PROCEDURE — 3051F HG A1C>EQUAL 7.0%<8.0%: CPT | Performed by: INTERNAL MEDICINE

## 2022-03-14 PROCEDURE — G8427 DOCREV CUR MEDS BY ELIG CLIN: HCPCS | Performed by: INTERNAL MEDICINE

## 2022-03-14 PROCEDURE — G8419 CALC BMI OUT NRM PARAM NOF/U: HCPCS | Performed by: INTERNAL MEDICINE

## 2022-03-14 PROCEDURE — 2022F DILAT RTA XM EVC RTNOPTHY: CPT | Performed by: INTERNAL MEDICINE

## 2022-03-14 PROCEDURE — 99214 OFFICE O/P EST MOD 30 MIN: CPT | Performed by: INTERNAL MEDICINE

## 2022-03-14 PROCEDURE — G0463 HOSPITAL OUTPT CLINIC VISIT: HCPCS | Performed by: INTERNAL MEDICINE

## 2022-03-14 PROCEDURE — 1090F PRES/ABSN URINE INCON ASSESS: CPT | Performed by: INTERNAL MEDICINE

## 2022-03-18 PROBLEM — E11.21 TYPE 2 DIABETES WITH NEPHROPATHY (HCC): Status: ACTIVE | Noted: 2019-09-19

## 2022-03-19 PROBLEM — I65.29 SYMPTOMATIC CAROTID ARTERY STENOSIS: Status: ACTIVE | Noted: 2018-04-12

## 2022-03-19 PROBLEM — G45.9 TIA (TRANSIENT ISCHEMIC ATTACK): Status: ACTIVE | Noted: 2018-04-10

## 2022-03-19 PROBLEM — E66.3 OVERWEIGHT WITH BODY MASS INDEX (BMI) OF 25 TO 25.9 IN ADULT: Status: ACTIVE | Noted: 2020-09-16

## 2022-03-20 PROBLEM — I77.1 SUBCLAVIAN ARTERY STENOSIS, LEFT (HCC): Status: ACTIVE | Noted: 2021-12-02

## 2022-04-05 PROBLEM — R55 SYNCOPE AND COLLAPSE: Status: ACTIVE | Noted: 2017-12-05

## 2022-04-05 PROBLEM — R73.09 OTHER ABNORMAL GLUCOSE: Status: ACTIVE | Noted: 2017-12-15

## 2022-04-05 PROBLEM — E78.5 OTHER AND UNSPECIFIED HYPERLIPIDEMIA: Status: ACTIVE | Noted: 2017-10-24

## 2022-04-05 PROBLEM — B37.9 CANDIDIASIS: Status: ACTIVE | Noted: 2018-11-13

## 2022-04-30 DIAGNOSIS — E78.5 DYSLIPIDEMIA: ICD-10-CM

## 2022-05-01 RX ORDER — ATORVASTATIN CALCIUM 40 MG/1
TABLET, FILM COATED ORAL
Qty: 90 TABLET | Refills: 3 | Status: SHIPPED | OUTPATIENT
Start: 2022-05-01

## 2022-06-02 DIAGNOSIS — I77.1 SUBCLAVIAN ARTERY STENOSIS, LEFT (HCC): ICD-10-CM

## 2022-06-02 DIAGNOSIS — I65.23 CAROTID STENOSIS, ASYMPTOMATIC, BILATERAL: ICD-10-CM

## 2022-06-06 DIAGNOSIS — G45.9 TRANSIENT CEREBRAL ISCHEMIA, UNSPECIFIED TYPE: ICD-10-CM

## 2022-06-07 RX ORDER — CLOPIDOGREL BISULFATE 75 MG/1
TABLET ORAL
Qty: 90 TABLET | Refills: 3 | Status: SHIPPED | OUTPATIENT
Start: 2022-06-07

## 2022-06-22 ENCOUNTER — OFFICE VISIT (OUTPATIENT)
Dept: VASCULAR SURGERY | Age: 72
End: 2022-06-22
Payer: MEDICARE

## 2022-06-22 VITALS
SYSTOLIC BLOOD PRESSURE: 124 MMHG | BODY MASS INDEX: 28.71 KG/M2 | HEIGHT: 63 IN | DIASTOLIC BLOOD PRESSURE: 60 MMHG | OXYGEN SATURATION: 97 % | HEART RATE: 64 BPM | WEIGHT: 162.04 LBS

## 2022-06-22 DIAGNOSIS — I65.23 CAROTID STENOSIS, ASYMPTOMATIC, BILATERAL: Primary | ICD-10-CM

## 2022-06-22 PROBLEM — N18.30 CHRONIC RENAL DISEASE, STAGE III (HCC): Status: ACTIVE | Noted: 2022-06-22

## 2022-06-22 PROCEDURE — 3017F COLORECTAL CA SCREEN DOC REV: CPT | Performed by: SURGERY

## 2022-06-22 PROCEDURE — G8754 DIAS BP LESS 90: HCPCS | Performed by: SURGERY

## 2022-06-22 PROCEDURE — G8752 SYS BP LESS 140: HCPCS | Performed by: SURGERY

## 2022-06-22 PROCEDURE — G8417 CALC BMI ABV UP PARAM F/U: HCPCS | Performed by: SURGERY

## 2022-06-22 PROCEDURE — 99213 OFFICE O/P EST LOW 20 MIN: CPT | Performed by: SURGERY

## 2022-06-22 PROCEDURE — G8427 DOCREV CUR MEDS BY ELIG CLIN: HCPCS | Performed by: SURGERY

## 2022-06-22 PROCEDURE — 1123F ACP DISCUSS/DSCN MKR DOCD: CPT | Performed by: SURGERY

## 2022-06-22 PROCEDURE — G9899 SCRN MAM PERF RSLTS DOC: HCPCS | Performed by: SURGERY

## 2022-06-22 PROCEDURE — 1090F PRES/ABSN URINE INCON ASSESS: CPT | Performed by: SURGERY

## 2022-06-22 PROCEDURE — G8536 NO DOC ELDER MAL SCRN: HCPCS | Performed by: SURGERY

## 2022-06-22 PROCEDURE — G8399 PT W/DXA RESULTS DOCUMENT: HCPCS | Performed by: SURGERY

## 2022-06-22 PROCEDURE — G8510 SCR DEP NEG, NO PLAN REQD: HCPCS | Performed by: SURGERY

## 2022-06-22 PROCEDURE — 1101F PT FALLS ASSESS-DOCD LE1/YR: CPT | Performed by: SURGERY

## 2022-06-22 NOTE — PROGRESS NOTES
Олег Bean  Chief Complaint   Patient presents with    Carotid Artery Stenosis       History and Physical    69 y/o F returns for known asymptomatic carotid artery stenosis. She also has known left subclavian artery disease and vertebral artery disease for which she has been asymptomatic. The plan at the last visit was to repeat a carotid duplex in 6 mos. She returns today and denies sx of CVA/TIA/AF. No dizziness or syncope. She did suffer a fall in Dec 2021. She was walking down a staircase, and tripped. No LOC. She sustained a L ankle fracture. She underwent operative repair, and has just recently completed physical therapy. Her pain is improving daily. Past Medical History:   Diagnosis Date    Allergic rhinitis     Carotid artery disease ([facility] [address] 2017    Dr Lori Boyer    Diabetes [facility])     Dr Lillian Price GERD (gastroesophageal reflux disease)     H/O cardiovascular stress test     thallium neg ef 70% (3/12);  Dr Calleen Mortimer; NST neg, ef 76% (2018)    H/O echocardiogram     conc lvh, ef 60%, DD (3/12); nl lv, ef 68%, conc lvh, no wma, mild dd, no valvular abn, neg bubble study (11/17 - [facility]); nl lv, ef 72%, no wma, gr 1 dd, nl valves (8/18)    Hyperlipidemia     Hypertension     Hypovitaminosis D     Nephrolithiasis 06/2016    Dr Fabian Monday; right ureteral stone s/p EWSL    Obesity     W - 12/19    TIA (transient ischemic attack) 04/2018     Patient Active Problem List   Diagnosis Code    Hypovitaminosis D E55.9    Dyslipidemia E78.5    Essential hypertension I10    GERD without esophagitis K21.9    Advance directive discussed with patient Z70.80    Nephrolithiasis s/p EWSL Dr Fabian Monday 6/16 N20.0    TIA (transient ischemic attack) G45.9    Symptomatic carotid artery stenosis I65.29    Type 2 diabetes with nephropathy (HCC) E11.21    Overweight with body mass index (BMI) of 25 to 25.9 in adult E66.3, Z68.25    Subclavian artery stenosis, left (HCC) I77.1    Abnormal levels of other serum enzymes R74.8    Candidiasis B37.9    Dizziness and giddiness R42    Other and unspecified hyperlipidemia E78.5    Other abnormal glucose R73.09    Syncope and collapse R55     Past Surgical History:   Procedure Laterality Date    HX ANKLE FRACTURE TX Left 01/2022    Dr Beverly Bocanegra    HX BREAST BIOPSY  2009    negative    HX COLONOSCOPY      Dr Emmie Tirado 12/13 neg    HX ORTHOPAEDIC      DEXA t score 1.10 spine, 1.10 hip (11/13)    HX UROLOGICAL  06/10/2016    [facility]  11/[facility]    mri showed mod periventricular wm changes    OR CARDIAC SURG PROCEDURE UNLIST  01/2018    holter few pac/pvc, 4 beat run svt rate 156    VASCULAR SURGERY PROCEDURE UNLIST  11/2017    BICA<50%, RIGHT vertebral art obst    VASCULAR SURGERY PROCEDURE UNLIST  12/2017    CTA head/neck showed no aneurysm, <50% right intracranial ICA, diminutive right vertebral art    VASCULAR SURGERY PROCEDURE UNLIST  04/2018    RABI 1.0/LABI  1.1    VASCULAR SURGERY PROCEDURE UNLIST  04/2018    MRA right vert artery not visualized due to occlusion/stenosis, left patent, prox PAIGE 50-60% stenosis    VASCULAR SURGERY PROCEDURE UNLIST  10/2019    CTA showed 75% PAIGE, 94% prox LICA, mild RSCA sten, mod LSCA stenosis; Dr Lyndsay Ontiveros     Current Outpatient Medications   Medication Sig Dispense Refill    clopidogreL (PLAVIX) 75 mg tab take 1 tablet by mouth once daily 90 Tablet 3    atorvastatin (LIPITOR) 40 mg tablet take 1 tablet by mouth once daily 90 Tablet 3    prednisoLONE acetate (PRED FORTE) 1 % ophthalmic suspension prednisolone acetate 1 % eye drops,suspension   INSTILL 1 DROP INTO LEFT EYE 4 TIMES A DAY. HOLD FOR USE UNTIL AFTER PROCEDURE HAS BEEN COMPLETED.       carvediloL (COREG) 25 mg tablet take 1 tablet by mouth twice a day with meals 180 Tablet 3    fluticasone propionate (FLONASE) 50 mcg/actuation nasal spray 2 sprays each nostril daily 1 Each 11    BD Veo Insulin Syringe UF 0.3 mL 31 gauge x 15/64\" syrg USE four times a day DX.E11.65      olmesartan (BENICAR) 40 mg tablet       exenatide microspheres (Bydureon) 2 mg/0.65 mL pnij 2 mg by SubCUTAneous route every seven (7) days.  diclofenac (VOLTAREN) 1 % gel Apply 4 g to affected area four (4) times daily. Left knee and left leg 5 Each 5    NOVOLIN R REGULAR U-100 INSULN 100 unit/mL injection 7 Units by SubCUTAneous route.  0    NOVOLIN N NPH U-100 INSULIN 100 unit/mL injection 30 Units. 0    folic acid/multivit-min/lutein (CENTRUM SILVER PO) Take 1 Tab by mouth daily.  metFORMIN (GLUCOPHAGE) 1,000 mg tablet Take 1,000 mg by mouth two (2) times daily (with meals).  amLODIPine (NORVASC) 10 mg tablet Take 10 mg by mouth daily.  Cholecalciferol, Vitamin D3, (VITAMIN D) 5,000 unit Tab Take 5,000 Units by mouth daily. Allergies   Allergen Reactions    Lopid [Gemfibrozil] Other (comments)     elev lfts    Penicillins Shortness of Breath and Nausea and Vomiting        Social History     Tobacco Use    Smoking status: Never Smoker    Smokeless tobacco: Never Used   Vaping Use    Vaping Use: Never used   Substance Use Topics    Alcohol use:  Yes     Alcohol/week: 2.0 standard drinks     Types: 1 Glasses of wine, 1 Cans of beer per week     Comment: social glass once a year    Drug use: No        Family History   Problem Relation Age of Onset    Diabetes Father     Hypertension Father     Cancer Sister     Hypertension Sister        Review of Systems    General: negative for fever   Eyes: negative for vision loss   HENT: negative for cold symptoms   Respiratory negative for shortness of breath   Cardiac: negative for chest pain   Vascular negative for foot pain at night    Gastrointestinal: negative for abdominal pain   Genitourinary: negative for dysuria    Endocrine: negative for excessive thirst   Skin: negative for rash   Neurological: negative for paralysis   Psychiatric: negative for depression Physical Exam:    Visit Vitals  /60 (BP 1 Location: Right arm, BP Patient Position: Sitting)   Pulse 64   Ht 5' 3\" (1.6 m)   Wt 162 lb 0.6 oz (73.5 kg)   SpO2 97%   BMI 28.70 kg/m²        Constitutional:  Patient is well developed, well nourished, and not distressed. HEENT: Atraumatic, normocephalic, wearing a mask. Eyes:   Cunjunctivae clear, no scleral icterus  Cardiovascular:  Normal rate, regular rhythm, normal heart sounds. Pulmonary/Chest: Effort normal and breath sounds normal.  she has no wheezes or no rales. Abdominal:   Soft, non-distended. No tenderness to palpation. Extremities:   BLE warm. Compression stockings in place. Neurological:  she  is alert and oriented x3 Gait normal. Motor & sensory grossly intact in all 4 limbs. Psych: Appropriate mood and affect. Imaging/Studies:   6/16/22 Carotid duplex: bilateral ICA with 50-69% stenosis. New findings to suggest R VA occlusion, and there is continued L subclavian artery disease. Otherwise, bilateral ICAs are unchanged. Carotid duplex 11/23/2021: R ICA with 50-69% stenosis, L ICA with 50-69% stenosis. The L vertebral artery demonstrates proximal stenosis. The L subclavian artery deonstrates > 50% stenosis (344 cm/s PSV). Impression:  Asymptomatic bilateral ICA stenosis. Asymptomatic vertebral and subclavian artery disease. Plan:  RTC in 6 months for carotid duplex and clinical visit. Continue plavix and statin. Jass Oneill MD  Vascular Surgeon      I spent approximately 20 minutes on this patient encounter, which includes but is not limited to performing a history and physical exam, reviewing primary care and consultant documentation, reviewing imaging/studies, and speaking with her regarding my impression and plan.

## 2022-06-22 NOTE — PROGRESS NOTES
1. Have you been to the ER, urgent care clinic since your last visit? No      Hospitalized since your last visit? No     2. Have you seen or consulted any other health care providers outside of the [address] [place] [address] since your last visit? Include any pap smears or colon screening.   No

## 2022-09-13 ENCOUNTER — APPOINTMENT (OUTPATIENT)
Dept: INTERNAL MEDICINE CLINIC | Age: 72
End: 2022-09-13

## 2022-09-13 ENCOUNTER — HOSPITAL ENCOUNTER (OUTPATIENT)
Dept: LAB | Age: 72
Discharge: HOME | End: 2022-09-13
Payer: MEDICARE

## 2022-09-13 DIAGNOSIS — E11.21 TYPE 2 DIABETES WITH NEPHROPATHY (HCC): ICD-10-CM

## 2022-09-13 LAB
CREAT UR-MCNC: 315 MG/DL (ref 30–125)
EST. AVERAGE GLUCOSE BLD GHB EST-MCNC: 249 MG/DL
HBA1C MFR BLD: 10.3 % (ref 4.2–5.6)
MICROALBUMIN UR-MCNC: 8.23 MG/DL (ref 0–3)
MICROALBUMIN/CREAT UR-RTO: 26 MG/G (ref 0–30)

## 2022-09-13 PROCEDURE — 36415 COLL VENOUS BLD VENIPUNCTURE: CPT

## 2022-09-13 PROCEDURE — 83036 HEMOGLOBIN GLYCOSYLATED A1C: CPT

## 2022-09-13 PROCEDURE — 82043 UR ALBUMIN QUANTITATIVE: CPT

## 2022-09-13 NOTE — PROGRESS NOTES
This is a subsequent medicare wellness visit    I have reviewed the patient's medical history in detail and updated the computerized patient record. Assessment/Plan   Education and counseling provided:  Are appropriate based on today's review and evaluation  End-of-Life planning (with patient's consent)  Influenza Vaccine  Screening Mammography  Colorectal cancer screening tests  Cardiovascular screening blood test    1. Medicare annual wellness visit, subsequent  2. Dyslipidemia  3. Essential hypertension  4. Type 2 diabetes with nephropathy (HCC)  -     CBC W/O DIFF; Future  -     METABOLIC PANEL, COMPREHENSIVE; Future  -     LIPID PANEL; Future  -     HEMOGLOBIN A1C WITH EAG; Future  5. GERD without esophagitis  6. Advanced directives, counseling/discussion  -     ADVANCE CARE PLANNING FIRST 30 MINS       Depression Risk Factor Screening     3 most recent PHQ Screens 9/20/2022   PHQ Not Done -   Little interest or pleasure in doing things Not at all   Feeling down, depressed, irritable, or hopeless Not at all   Total Score PHQ 2 0       Alcohol & Drug Abuse Risk Screen    Do you average more than 1 drink per night or more than 7 drinks a week:  No    On any one occasion in the past three months have you have had more than 3 drinks containing alcohol:  No          Functional Ability and Level of Safety    Hearing: Hearing is good. Activities of Daily Living: The home contains: no safety equipment. Patient does total self care      Ambulation: with no difficulty     Fall Risk:  Fall Risk Assessment, last 12 mths 9/20/2022   Able to walk? Yes   Fall in past 12 months? 1   Do you feel unsteady? 1   Are you worried about falling 1   Is TUG test greater than 12 seconds? (No Data)   Is the gait abnormal? 1   Number of falls in past 12 months 1   Fall with injury?  1      Abuse Screen:  Patient is not abused       Cognitive Screening    Has your family/caregiver stated any concerns about your memory: no Cognitive Screening: Normal - Mini Cog Test    Health Maintenance Due     Health Maintenance Due   Topic Date Due    Foot Exam Q1  09/16/2021    COVID-19 Vaccine (4 - Booster for Pfizer series) 02/01/2022    DTaP/Tdap/Td series (2 - Td or Tdap) 02/28/2022    Flu Vaccine (1) 09/01/2022       Patient Care Team   Patient Care Team:  Coco Adams MD as PCP - General (Internal Medicine Physician)  Coco Adams MD as PCP - [address] [place] [address] Provider  Kyle Chow MD (Ophthalmology)  Virgin Gilford, NP Francene Nip, MD (Vascular Surgery)    History     Patient Active Problem List   Diagnosis Code    Hypovitaminosis D E55.9    Dyslipidemia E78.5    Essential hypertension I10    GERD without esophagitis K21.9    Advance directive discussed with patient Z71.89    Nephrolithiasis s/p EWSL Dr Liset Valadez 6/16 N20.0    TIA (transient ischemic attack) G45.9    Symptomatic carotid artery stenosis I65.29    Type 2 diabetes with nephropathy ([facility] [address] E11.21    Overweight with body mass index (BMI) of 25 to 25.9 in adult E66.3, Z68.25    Subclavian artery stenosis, left (HCC) I77.1     Past Medical History:   Diagnosis Date    Allergic rhinitis     Carotid artery disease ([facility] [address] 2017    Dr Adelaida Diaz    Diabetes [facility])     Dr Marcelle Marroquin    GERD (gastroesophageal reflux disease)     H/O cardiovascular stress test     thallium neg ef 70% (3/12);  Dr Beck Sites; NST neg, ef 76% (2018)    H/O echocardiogram     conc lvh, ef 60%, DD (3/12); nl lv, ef 68%, conc lvh, no wma, mild dd, no valvular abn, neg bubble study (11/17 - [facility]); nl lv, ef 72%, no wma, gr 1 dd, nl valves (8/18)    Hyperlipidemia     Hypertension     Hypovitaminosis D     Nephrolithiasis 06/2016    Dr Liset Valadez; right ureteral stone s/p EWSL    Obesity     W - 12/19    TIA (transient ischemic attack) 04/2018      Past Surgical History:   Procedure Laterality Date    HX ANKLE FRACTURE TX Left 01/2022    Dr Leti Painter BREAST BIOPSY  2009    negative    HX COLONOSCOPY Dr Ventura Mins 12/13 neg    HX ORTHOPAEDIC      DEXA t score 1.10 spine, 1.10 hip (11/13)    HX UROLOGICAL  06/10/2016    RIGHT ESWL Dr. Shonna Dunn  11/2017    mri showed mod periventricular wm changes    MS CARDIAC SURG PROCEDURE UNLIST  01/2018    holter few pac/pvc, 4 beat run svt rate 156    VASCULAR SURGERY PROCEDURE UNLIST  11/2017    BICA<50%, RIGHT vertebral art obst    VASCULAR SURGERY PROCEDURE UNLIST  12/2017    CTA head/neck showed no aneurysm, <50% right intracranial ICA, diminutive right vertebral art    VASCULAR SURGERY PROCEDURE UNLIST  04/2018    RABI 1.0/LABI  1.1    VASCULAR SURGERY PROCEDURE UNLIST  04/2018    MRA right vert artery not visualized due to occlusion/stenosis, left patent, prox PAIGE 50-60% stenosis    VASCULAR SURGERY PROCEDURE UNLIST  10/2019    CTA showed 87% PAIGE, 75% prox LICA, mild RSCA sten, mod LSCA stenosis; Dr Gracia April     Current Outpatient Medications   Medication Sig Dispense Refill    clopidogreL (PLAVIX) 75 mg tab take 1 tablet by mouth once daily 90 Tablet 3    atorvastatin (LIPITOR) 40 mg tablet take 1 tablet by mouth once daily 90 Tablet 3    carvediloL (COREG) 25 mg tablet take 1 tablet by mouth twice a day with meals 180 Tablet 3    fluticasone propionate (FLONASE) 50 mcg/actuation nasal spray 2 sprays each nostril daily 1 Each 11    BD Veo Insulin Syringe UF 0.3 mL 31 gauge x 15/64\" syrg USE four times a day DX.E11.65      olmesartan (BENICAR) 40 mg tablet       exenatide microspheres (Bydureon) 2 mg/0.65 mL pnij 2 mg by SubCUTAneous route every seven (7) days. diclofenac (VOLTAREN) 1 % gel Apply 4 g to affected area four (4) times daily. Left knee and left leg 5 Each 5    NOVOLIN R REGULAR U-100 INSULN 100 unit/mL injection 7 Units by SubCUTAneous route.  0    NOVOLIN N NPH U-100 INSULIN 100 unit/mL injection 30 Units. 0    folic acid/multivit-min/lutein (CENTRUM SILVER PO) Take 1 Tab by mouth daily.       metFORMIN (GLUCOPHAGE) 1,000 mg tablet Take 1,000 mg by mouth two (2) times daily (with meals). amLODIPine (NORVASC) 10 mg tablet Take 10 mg by mouth daily. cholecalciferol (VITAMIN D3) (5000 Units/125 mcg) tab tablet Take 5,000 Units by mouth daily. Allergies   Allergen Reactions    Lopid [Gemfibrozil] Other (comments)     elev lfts    Penicillins Shortness of Breath and Nausea and Vomiting       Family History   Problem Relation Age of Onset    Diabetes Father     Hypertension Father     Cancer Sister     Hypertension Sister      Social History     Tobacco Use    Smoking status: Never    Smokeless tobacco: Never   Substance Use Topics    Alcohol use:  Yes     Alcohol/week: 2.0 standard drinks     Types: 1 Glasses of wine, 1 Cans of beer per week     Comment: social glass once a year         Aarti aMrroquin MD

## 2022-09-13 NOTE — PROGRESS NOTES
67 y.o. BLACK/ female who presents for evaluation. Sister was present for the evaluation    She successfully underwent surgery for trimalleolar fx of left ankle by Dr Stefano Ryan, the PT is finishing up and walking without a walker now    No cardiovascular complaints. Bp has been controlled when she checks    She continues to see Dr Mali Goss but apparently missed her appt in the summer. Also, she had to come off the bydureon due to being in the donut hole. Not checking her sugars routinely. Denies polyuria, polydipsia, nocturia, vision change. No GI or Gu complaints. She decided to stay with the [facility] provider    LAST MEDICARE WELLNESS EXAM: 12/29/15, 6/27/17, 7/16/18, 9/19/19, 9/14/20, 9/9/21, 9/20/22    Past Medical History:   Diagnosis Date    Allergic rhinitis     Carotid artery disease ([facility] [address] 2017    Dr Elvis Steiner    Diabetes [facility])     Dr Mali Goss    GERD (gastroesophageal reflux disease)     H/O cardiovascular stress test     thallium neg ef 70% (3/12);  Dr Jerrod Malone; NST neg, ef 76% (2018)    H/O echocardiogram     conc lvh, ef 60%, DD (3/12); nl lv, ef 68%, conc lvh, no wma, mild dd, no valvular abn, neg bubble study (11/17 - [facility]); nl lv, ef 72%, no wma, gr 1 dd, nl valves (8/18)    Hyperlipidemia     Hypertension     Hypovitaminosis D     Nephrolithiasis 06/2016    Dr Rey Obrien; right ureteral stone s/p EWSL    Obesity     W - 12/19    TIA (transient ischemic attack) 04/2018     Past Surgical History:   Procedure Laterality Date    HX ANKLE FRACTURE TX Left 01/2022    Dr Sunny Pastor BREAST BIOPSY  2009    negative    HX COLONOSCOPY      Dr Tori Goldsmith 12/13 neg    HX ORTHOPAEDIC      DEXA t score 1.10 spine, 1.10 hip (11/13)    HX UROLOGICAL  06/10/2016    RIGHT ESWL Dr. Danyelle Hernandez  11/2017    mri showed mod periventricular wm changes    [place] CARDIAC SURG PROCEDURE UNLIST  01/2018    holter few pac/pvc, 4 beat run svt rate 156    VASCULAR SURGERY PROCEDURE UNLIST  11/2017    BICA<50%, RIGHT vertebral art obst    VASCULAR SURGERY PROCEDURE UNLIST  12/2017    CTA head/neck showed no aneurysm, <50% right intracranial ICA, diminutive right vertebral art    VASCULAR SURGERY PROCEDURE UNLIST  04/2018    RABI 1.0/LABI  1.1    VASCULAR SURGERY PROCEDURE UNLIST  04/2018    MRA right vert artery not visualized due to occlusion/stenosis, left patent, prox PAIGE 50-60% stenosis    VASCULAR SURGERY PROCEDURE UNLIST  10/2019    CTA showed 34% PAIGE, 07% prox LICA, mild RSCA sten, mod LSCA stenosis; Dr Ariana Vargas History     Socioeconomic History    Marital status:      Spouse name: Not on file    Number of children: Not on file    Years of education: Not on file    Highest education level: Not on file   Occupational History    Occupation: retired teacher [place]   Tobacco Use    Smoking status: Never    Smokeless tobacco: Never   Vaping Use    Vaping Use: Never used   Substance and Sexual Activity    Alcohol use:  Yes     Alcohol/week: 2.0 standard drinks     Types: 1 Glasses of wine, 1 Cans of beer per week     Comment: social glass once a year    Drug use: No    Sexual activity: Not on file   Other Topics Concern    Not on file   Social History Narrative    Not on file     Social Determinants of Health     Financial Resource Strain: Not on file   Food Insecurity: Not on file   Transportation Needs: Not on file   Physical Activity: Not on file   Stress: Not on file   Social Connections: Not on file   Intimate Partner Violence: Not on file   Housing Stability: Not on file     Allergies   Allergen Reactions    Lopid [Gemfibrozil] Other (comments)     elev lfts    Penicillins Shortness of Breath and Nausea and Vomiting       Current Outpatient Medications   Medication Sig    clopidogreL (PLAVIX) 75 mg tab take 1 tablet by mouth once daily    atorvastatin (LIPITOR) 40 mg tablet take 1 tablet by mouth once daily    carvediloL (COREG) 25 mg tablet take 1 tablet by mouth twice a day with meals    fluticasone propionate (FLONASE) 50 mcg/actuation nasal spray 2 sprays each nostril daily    BD Veo Insulin Syringe UF 0.3 mL 31 gauge x 15/64\" syrg USE four times a day DX.E11.65    olmesartan (BENICAR) 40 mg tablet     exenatide microspheres (Bydureon) 2 mg/0.65 mL pnij 2 mg by SubCUTAneous route every seven (7) days. diclofenac (VOLTAREN) 1 % gel Apply 4 g to affected area four (4) times daily. Left knee and left leg    NOVOLIN R REGULAR U-100 INSULN 100 unit/mL injection 7 Units by SubCUTAneous route. NOVOLIN N NPH U-100 INSULIN 100 unit/mL injection 30 Units. folic acid/multivit-min/lutein (CENTRUM SILVER PO) Take 1 Tab by mouth daily. metFORMIN (GLUCOPHAGE) 1,000 mg tablet Take 1,000 mg by mouth two (2) times daily (with meals). amLODIPine (NORVASC) 10 mg tablet Take 10 mg by mouth daily. cholecalciferol (VITAMIN D3) (5000 Units/125 mcg) tab tablet Take 5,000 Units by mouth daily. No current facility-administered medications for this visit. REVIEW OF SYSTEMS: mammo 9/21, DEXA 11/13 declined further, sees Dr Bryce Pickens, no podiatry, colo 12/13 Dr Yajaira Russ  Ophtho - no vision change or eye pain  Oral - no mouth pain, tongue or tooth problems  Ears - no hearing loss, ear pain, fullness, no swallowing problems  Cardiac - no CP, PND, orthopnea, edema, palpitations or syncope  Chest - no breast masses  Resp - no wheezing, chronic coughing, dyspnea  GI - no heartburn, nausea, vomiting, change in bowel habits, bleeding, hemorrhoids  Urinary - no dysuria, hematuria, flank pain, urgency, frequency  Genitals - no genital lesions, discharge, masses, ulceration, warts  Ortho - no swelling, dec ROM, myalgias    Visit Vitals  /60   Pulse 60   Temp 97 °F (36.1 °C) (Temporal)   Resp 16   Ht 5' 3\" (1.6 m)   Wt 164 lb (74.4 kg)   SpO2 98%   BMI 29.05 kg/m²       A&O x3  Affect is appropriate.   Mood stable  No apparent distress  Anicteric, no JVD, adenopathy or thyromegaly. No carotid bruits or radiated murmur  Lungs clear to auscultation, no wheezes or rales  Heart showed regular rate and rhythm. No murmur, rubs, gallops  Abdomen soft nontender, no hepatosplenomegaly or masses.    Left ankle is in comp sock; RLE without c/c/e, 1+ pulses    LABS  From 2/12 showed   gluc 330, cr 0.93, gfr 77,  alt 30, hba1c 11.2,                    chol 199, tg 366, hdl 35, ldl-c 67,   wbc 7.1, hb 15.2, plt 273,  tsh 2.20  From 6/12 showed                    hba1c 8.2,   ldl-p 1018, chol 112, tg 95,   hdl 32, ldl-c 61,   umar 1.5  From 12/12 showed                    hba1c 8.9,   ldl-p 1486, chol 132, tg 109, hdl 37, ldl-c 73  From 6/13 showed   gluc 128, cr 0.91, gfr 68,  alt 13, hba1c 7.8,                    chol 115, tg 157, hdl 28, ldl-c 56  From 12/13 showed                   hba1c 7.5,                    wbc 7.5, hb 13.2, plt 297,  vit d 69.2  From 6/14 showed   gluc 171, cr 0.85, gfr>60, alt<5,          chol 134, tg 165, hdl 26, ldl-c 73  From 12/14 showed         hba1c 9.2,   umar 4       chol 151, tg 256, hdl 33, ldl-c 67,     From 3/15 showed        hba1c 7.3,   umar neg           ldl-c 82,   From 7/15 showed   gluc 130, cr 1.16, gfr 49,  alt 15, hba1c 7.5,         chol 128, tg 271, hdl 29, ldl-c 45  From 12/15 showed         hba1c 9.6,   umar 7.0    chol 160, tg 218, hdl 33, ldl-c 83,     From 2/16 showed   gluc 117, cr 1.15, gfr 58,  alt 18, hba1c 7.9,   umar 4.7     chol 112, tg 184, hdl 30, ldl-c 45,   ,                         vit d 45.3, uric 7.2  From 6/16 showed   gluc 98       hba1c 7.7,         chol 143, tg 178, hdl 31, ldl-c 76  From 12/16 showed gluc 170, cr 1.11, gfr 60,     hba1c 8.9,   umar 4.0  From 6/17 showed   gluc 148, cr 1.06, gfr>60,     hba1c 8.1,         chol 192, tg 202, hdl 39, ldl-c 113, wbc 6.3, hb 13.8, plt 281  From 1/18 showed   gluc 154, cr 1.08, gfr>60, alt 11, hba1c 8.0,   umar 16.0,                                 vit d 40.7  From 4/18 showed gluc 140, cr 0.90, gfr>60,     hba1c 8.5,         chol 216, tg 356, hdl 31, ldl-c na    wbc 7.0, hb 13.1, plt 244, ck/trop-  From 5/18 showed         hba1c 8.1  From 4/19 showed   gluc 237, cr 0.98, gfr 69,  alt 14,          chol 121, tg 135, hdl 29, ldl-c 65  From 9/19 showed   gluc 218, cr 1.11, gfr 59,  alt 20, hba1c 10.8, umar 17      chol 110, tg 127, hdl 30, ldl-c 55,   wbc 6.9, hb 13.5, plt 260  From 3/20 showed   gluc 170, cr 1.04, gfr>60, alt 18, hba1c 7.7,         chol 121, tg 94,   hdl 36, ldl-c 66,   wbc 6.0, hb 13.6, plt 288  From 9/20 showed         hba1c 7.5,  umar 16,                vit d 69.3  From 3/21 showed   gluc 169, cr 0.98, gfr>60, alt 16, hba1c 8.1,         chol 151, tg 125, hdl 35, ldl-c 91,   wbc 7.4, hb 13.6, plt 300  From 9/21 showed         hba1c 8.8,  umar 24  From 12/21 showed         hba1c 8.4         wbc 7.6, hb 12.2, plt 303, vit d 86.5  From 3/22 showed   gluc 161, cr 0.94, gfr>60, alt 21, hba1c 7.2,         chol 108, tg 88,   hdl 38, ldl-c 52,   wbc 7.5, hb 12.3, plt 255    Results for orders placed or performed during the hospital encounter of 09/13/22   MICROALBUMIN, UR, RAND W/ MICROALB/CREAT RATIO   Result Value Ref Range    Microalbumin,urine random 8.23 (H) 0 - 3.0 MG/DL    Creatinine, urine 315.00 (H) 30 - 125 mg/dL    Microalbumin/Creat ratio (mg/g creat) 26 0 - 30 mg/g   HEMOGLOBIN A1C WITH EAG   Result Value Ref Range    Hemoglobin A1c 10.3 (H) 4.2 - 5.6 %    Est. average glucose 249 mg/dL     We reviewed the patient's labs from the last several visits to point out trends in the numbers          Patient Active Problem List   Diagnosis Code    Hypovitaminosis D E55.9    Dyslipidemia E78.5    Essential hypertension I10    GERD without esophagitis K21.9    Advance directive discussed with patient Z71.89    Nephrolithiasis s/p EWSL Dr Riya Sauceda 6/16 N20.0    TIA (transient ischemic attack) G45.9    Symptomatic carotid artery stenosis I65.29    [facility] E11.21    Overweight with body mass index (BMI) of 25 to 25.9 in adult E66.3, Z68.25    Subclavian artery stenosis, left (HCC) I77.1     Assessment and plan:  1. Diabetes. F/u w Dr Mali Goss and Dr Jc Flores. A1c up due to med noncompliance most likely and she will talk to Dr Mali Goss about how to deal with the glp going forward  2. Dyslipidemia. At target on lipitor   3. Hypertension. Continue coreg, arb, amlo  4. GERD. PPI and avoidance measures  5. Hypovit d. Check lab next draw  6. TIA. Continue current regimen. 7.  Nephrolithiasis. F/U Dr Rey Obrien  8. Vascular. F/U Dr Elvis Steiner  9. Ortho. Per Dr Stefano Ryan; handicap DMV form filled out      RTC 3/23    Above conditions discussed at length and patient vocalized understanding. All questions answered to patient satisfaction        ICD-10-CM ICD-9-CM    1. Medicare annual wellness visit, subsequent  Z00.00 V70.0       2. Dyslipidemia  E78.5 272.4       3. Essential hypertension  I10 401.9       4. Type 2 diabetes with nephropathy (HCC)  E11.21 250.40 CBC W/O DIFF     806.04 METABOLIC PANEL, COMPREHENSIVE      LIPID PANEL      HEMOGLOBIN A1C WITH EAG      5. GERD without esophagitis  K21.9 530.81       6.  Advanced directives, counseling/discussion  Z71.89 V65.49 ADVANCE CARE PLANNING FIRST 30 MINS

## 2022-09-20 ENCOUNTER — OFFICE VISIT (OUTPATIENT)
Dept: INTERNAL MEDICINE CLINIC | Age: 72
End: 2022-09-20
Payer: MEDICARE

## 2022-09-20 VITALS
OXYGEN SATURATION: 98 % | RESPIRATION RATE: 16 BRPM | DIASTOLIC BLOOD PRESSURE: 60 MMHG | TEMPERATURE: 97 F | SYSTOLIC BLOOD PRESSURE: 129 MMHG | HEIGHT: 63 IN | WEIGHT: 164 LBS | BODY MASS INDEX: 29.06 KG/M2 | HEART RATE: 60 BPM

## 2022-09-20 DIAGNOSIS — E11.21 TYPE 2 DIABETES WITH NEPHROPATHY (HCC): ICD-10-CM

## 2022-09-20 DIAGNOSIS — Z00.00 MEDICARE ANNUAL WELLNESS VISIT, SUBSEQUENT: Primary | ICD-10-CM

## 2022-09-20 DIAGNOSIS — Z71.89 ADVANCED DIRECTIVES, COUNSELING/DISCUSSION: ICD-10-CM

## 2022-09-20 DIAGNOSIS — E78.5 DYSLIPIDEMIA: ICD-10-CM

## 2022-09-20 DIAGNOSIS — K21.9 GERD WITHOUT ESOPHAGITIS: ICD-10-CM

## 2022-09-20 DIAGNOSIS — I10 ESSENTIAL HYPERTENSION: ICD-10-CM

## 2022-09-20 PROBLEM — R73.09 OTHER ABNORMAL GLUCOSE: Status: RESOLVED | Noted: 2017-12-15 | Resolved: 2022-09-20

## 2022-09-20 PROBLEM — R55 SYNCOPE AND COLLAPSE: Status: RESOLVED | Noted: 2017-12-05 | Resolved: 2022-09-20

## 2022-09-20 PROBLEM — B37.9 CANDIDIASIS: Status: RESOLVED | Noted: 2018-11-13 | Resolved: 2022-09-20

## 2022-09-20 PROBLEM — N18.30 CHRONIC RENAL DISEASE, STAGE III (HCC): Status: RESOLVED | Noted: 2022-06-22 | Resolved: 2022-09-20

## 2022-09-20 PROCEDURE — G0463 HOSPITAL OUTPT CLINIC VISIT: HCPCS | Performed by: INTERNAL MEDICINE

## 2022-09-20 PROCEDURE — G9899 SCRN MAM PERF RSLTS DOC: HCPCS | Performed by: INTERNAL MEDICINE

## 2022-09-20 PROCEDURE — G8510 SCR DEP NEG, NO PLAN REQD: HCPCS | Performed by: INTERNAL MEDICINE

## 2022-09-20 PROCEDURE — 99497 ADVNCD CARE PLAN 30 MIN: CPT | Performed by: INTERNAL MEDICINE

## 2022-09-20 PROCEDURE — 1101F PT FALLS ASSESS-DOCD LE1/YR: CPT | Performed by: INTERNAL MEDICINE

## 2022-09-20 PROCEDURE — 2022F DILAT RTA XM EVC RTNOPTHY: CPT | Performed by: INTERNAL MEDICINE

## 2022-09-20 PROCEDURE — G8754 DIAS BP LESS 90: HCPCS | Performed by: INTERNAL MEDICINE

## 2022-09-20 PROCEDURE — G0439 PPPS, SUBSEQ VISIT: HCPCS | Performed by: INTERNAL MEDICINE

## 2022-09-20 PROCEDURE — 3017F COLORECTAL CA SCREEN DOC REV: CPT | Performed by: INTERNAL MEDICINE

## 2022-09-20 PROCEDURE — G8752 SYS BP LESS 140: HCPCS | Performed by: INTERNAL MEDICINE

## 2022-09-20 PROCEDURE — 1090F PRES/ABSN URINE INCON ASSESS: CPT | Performed by: INTERNAL MEDICINE

## 2022-09-20 PROCEDURE — 1123F ACP DISCUSS/DSCN MKR DOCD: CPT | Performed by: INTERNAL MEDICINE

## 2022-09-20 PROCEDURE — G8399 PT W/DXA RESULTS DOCUMENT: HCPCS | Performed by: INTERNAL MEDICINE

## 2022-09-20 PROCEDURE — G8417 CALC BMI ABV UP PARAM F/U: HCPCS | Performed by: INTERNAL MEDICINE

## 2022-09-20 PROCEDURE — 3046F HEMOGLOBIN A1C LEVEL >9.0%: CPT | Performed by: INTERNAL MEDICINE

## 2022-09-20 PROCEDURE — 99214 OFFICE O/P EST MOD 30 MIN: CPT | Performed by: INTERNAL MEDICINE

## 2022-09-20 PROCEDURE — G8536 NO DOC ELDER MAL SCRN: HCPCS | Performed by: INTERNAL MEDICINE

## 2022-09-20 PROCEDURE — G8427 DOCREV CUR MEDS BY ELIG CLIN: HCPCS | Performed by: INTERNAL MEDICINE

## 2022-09-20 NOTE — PATIENT INSTRUCTIONS
Medicare Wellness Visit, Female     The best way to live healthy is to have a lifestyle where you eat a well-balanced diet, exercise regularly, limit alcohol use, and quit all forms of tobacco/nicotine, if applicable. Regular preventive services are another way to keep healthy. Preventive services (vaccines, screening tests, monitoring & exams) can help personalize your care plan, which helps you manage your own care. Screening tests can find health problems at the earliest stages, when they are easiest to treat. [facility] follows the current, evidence-based guidelines published by the [facility] Jadiel Parikh ([facility]) when recommending preventive services for our patients. Because we follow these guidelines, sometimes recommendations change over time as research supports it. (For example, mammograms used to be recommended annually. Even though Medicare will still pay for an annual mammogram, the newer guidelines recommend a mammogram every two years for women of average risk). Of course, you and your doctor may decide to screen more often for some diseases, based on your risk and your co-morbidities (chronic disease you are already diagnosed with). Preventive services for you include:  - Medicare offers their members a free annual wellness visit, which is time for you and your primary care provider to discuss and plan for your preventive service needs. Take advantage of this benefit every year!  -All adults over the age of 72 should receive the recommended pneumonia vaccines. Current USPSTF guidelines recommend a series of two vaccines for the best pneumonia protection.   -All adults should have a flu vaccine yearly and a tetanus vaccine every 10 years.   -All adults age 48 and older should receive the shingles vaccines (series of two vaccines).       -All adults age 38-68 who are overweight should have a diabetes screening test once every three years.   -All adults born between [birth year] and 1965 should be screened once for Hepatitis C.  -Other screening tests and preventive services for persons with diabetes include: an eye exam to screen for diabetic retinopathy, a kidney function test, a foot exam, and stricter control over your cholesterol.   -Cardiovascular screening for adults with routine risk involves an electrocardiogram (ECG) at intervals determined by your doctor.   -Colorectal cancer screenings should be done for adults age 54-65 with no increased risk factors for colorectal cancer. There are a number of acceptable methods of screening for this type of cancer. Each test has its own benefits and drawbacks. Discuss with your doctor what is most appropriate for you during your annual wellness visit. The different tests include: colonoscopy (considered the best screening method), a fecal occult blood test, a fecal DNA test, and sigmoidoscopy.    -A bone mass density test is recommended when a woman turns 65 to screen for osteoporosis. This test is only recommended one time, as a screening. Some providers will use this same test as a disease monitoring tool if you already have osteoporosis. -Breast cancer screenings are recommended every other year for women of normal risk, age 54-69.  -Cervical cancer screenings for women over age 72 are only recommended with certain risk factors.      Here is a list of your current Health Maintenance items (your personalized list of preventive services) with a due date:  Health Maintenance Due   Topic Date Due    Diabetic Foot Care  09/16/2021    COVID-19 Vaccine (4 - Booster for Pfizer series) 02/01/2022    DTaP/Tdap/Td  (2 - Td or Tdap) 02/28/2022    Yearly Flu Vaccine (1) 09/01/2022

## 2022-09-20 NOTE — ACP (ADVANCE CARE PLANNING)
Advance Care Planning     Advance Care Planning (ACP) Physician/NP/PA Conversation      Date of Conversation: 9/20/2022  Conducted with: Patient with Decision Making Capacity    Healthcare Decision Maker:     Primary Decision Maker: López Barr - Other Relative - [phone]    Primary Decision Maker: Geni Castillo - Daughter - [phone]  Click here to complete [address] including selection of the Healthcare Decision Maker Relationship (ie \"Primary\")      Today we documented Decision Maker(s) consistent with Legal Next of Kin hierarchy. Care Preferences:    Hospitalization: \"If your health worsens and it becomes clear that your chance of recovery is unlikely, what would be your preference regarding hospitalization? \"  The patient would prefer hospitalization. Ventilation: \"If you were unable to breathe on your own and your chance of recovery was unlikely, what would be your preference about the use of a ventilator (breathing machine) if it was available to you? \"   The patient would desire the use of a ventilator. Resuscitation: \"In the event your heart stopped as a result of an underlying serious health condition, would you want attempts to be made to restart your heart, or would you prefer a natural death? \"   Yes, attempt to resuscitate.     Additional topics discussed: ventilation preferences, hospitalization preferences, and resuscitation preferences    Conversation Outcomes / Follow-Up Plan:   ACP in process - information provided, considering goals and options  Reviewed DNR/DNI and patient elects Full Code (Attempt Resuscitation)     Length of Voluntary ACP Conversation in minutes:  16 minutes    Sergey Saleem MD

## 2022-09-20 NOTE — PROGRESS NOTES
Stephen Orlando presents with   Chief Complaint   Patient presents with    Annual Wellness Visit    Diabetes    Cholesterol Problem    Hypertension    Labs     9-13-22                1. \"Have you been to the ER, urgent care clinic since your last visit? Hospitalized since your last visit? \" No    2. \"Have you seen or consulted any other health care providers outside of the [address] [place] [address] since your last visit? \" No     3. For patients aged 39-70: Has the patient had a colonoscopy / FIT/ Cologuard? Yes - no Care Gap present      If the patient is female:    4. For patients aged 41-77: Has the patient had a mammogram within the past 2 years? Yes - no Care Gap present      5. For patients aged 21-65: Has the patient had a pap smear?  NA - based on age or sex

## 2022-12-15 ENCOUNTER — OFFICE VISIT (OUTPATIENT)
Dept: VASCULAR SURGERY | Age: 72
End: 2022-12-15
Payer: MEDICARE

## 2022-12-15 VITALS
WEIGHT: 162 LBS | OXYGEN SATURATION: 99 % | HEIGHT: 63 IN | BODY MASS INDEX: 28.7 KG/M2 | DIASTOLIC BLOOD PRESSURE: 80 MMHG | HEART RATE: 66 BPM | SYSTOLIC BLOOD PRESSURE: 118 MMHG

## 2022-12-15 DIAGNOSIS — I65.23 ASYMPTOMATIC BILATERAL CAROTID ARTERY STENOSIS: Primary | ICD-10-CM

## 2022-12-15 PROCEDURE — G8417 CALC BMI ABV UP PARAM F/U: HCPCS | Performed by: PHYSICIAN ASSISTANT

## 2022-12-15 PROCEDURE — G8754 DIAS BP LESS 90: HCPCS | Performed by: PHYSICIAN ASSISTANT

## 2022-12-15 PROCEDURE — G8752 SYS BP LESS 140: HCPCS | Performed by: PHYSICIAN ASSISTANT

## 2022-12-15 PROCEDURE — 1090F PRES/ABSN URINE INCON ASSESS: CPT | Performed by: PHYSICIAN ASSISTANT

## 2022-12-15 PROCEDURE — G8432 DEP SCR NOT DOC, RNG: HCPCS | Performed by: PHYSICIAN ASSISTANT

## 2022-12-15 PROCEDURE — 3017F COLORECTAL CA SCREEN DOC REV: CPT | Performed by: PHYSICIAN ASSISTANT

## 2022-12-15 PROCEDURE — 99214 OFFICE O/P EST MOD 30 MIN: CPT | Performed by: PHYSICIAN ASSISTANT

## 2022-12-15 PROCEDURE — 1101F PT FALLS ASSESS-DOCD LE1/YR: CPT | Performed by: PHYSICIAN ASSISTANT

## 2022-12-15 PROCEDURE — G9899 SCRN MAM PERF RSLTS DOC: HCPCS | Performed by: PHYSICIAN ASSISTANT

## 2022-12-15 PROCEDURE — 3074F SYST BP LT 130 MM HG: CPT | Performed by: PHYSICIAN ASSISTANT

## 2022-12-15 PROCEDURE — 1123F ACP DISCUSS/DSCN MKR DOCD: CPT | Performed by: PHYSICIAN ASSISTANT

## 2022-12-15 PROCEDURE — G8536 NO DOC ELDER MAL SCRN: HCPCS | Performed by: PHYSICIAN ASSISTANT

## 2022-12-15 PROCEDURE — G8427 DOCREV CUR MEDS BY ELIG CLIN: HCPCS | Performed by: PHYSICIAN ASSISTANT

## 2022-12-15 PROCEDURE — G8399 PT W/DXA RESULTS DOCUMENT: HCPCS | Performed by: PHYSICIAN ASSISTANT

## 2022-12-15 PROCEDURE — 3078F DIAST BP <80 MM HG: CPT | Performed by: PHYSICIAN ASSISTANT

## 2022-12-15 NOTE — PROGRESS NOTES
12/15/22        Glenys Salamanca        History and Physical    Meloenddavid Barrientos is a pleasant 67 y.o. female who presents today for follow-up from her known bilateral carotid stenosis disease. Patient has a history of TIAs in the past.  Patient states that she is doing well since her last visit and denies any signs or symptoms of TIA or stroke since her last visit. She continues to remain active and continues to be able to complete her ADLs without any issues. Patient denies any recent memory loss, loss for searching for words, photo disturbances, unilateral or bilateral weaknesses. Patient denies any chest pain or shortness of breath. Reports a very good appetite with no nausea or vomiting. Denies any bowel or bladder issues. She continues to ambulate daily without any leg pain or discomfort. Patient continues to take her daily meds as recommended including Plavix and statin medication. The most recent PVL performed on 12/15/2022 was reviewed and discussed with the patient:     surveillance known bilateral ICA disease   Dx: Carotid stenosis, asymptomatic, bilateral [I65.23 (ICD-10-CM)]     Procedure Technique    Duplex images were obtained using 2-D gray scale, color flow, and spectral Doppler analysis. Vitals    Weight Height BSA (calculated - sq m) BP Pulse (Heart Rate)            Interpretation Summary    Moderate 50-69% internal carotid artery disease bilaterally  Right vertebral artery signal is not detected suggesting occlusion. Left vertebral artery is antegrade, but \"hesitant\" suggesting pre steal.  Patent subclavian arteries bilaterally, with elevated velocites in the right suggestive of <50% stenosis and turbulent on the left suggestive of disease more proximally. When compared with previous exam dated 06/16/2022, there is no significant change. Cerebrovascular Findings    Right Carotid    The right CCA is patent. Carotid bulb has calcific plaque.  There is moderate stenosis in the right ICA (50-69%) and at the proximal segment. The right ICA has calcific plaque. The right vertebral is occluded. The right subclavian has <50% stenosis. The right subclavian has turbulent flow. Technically difficult exam bilaterally due to high bifurcation. Left Carotid    The left CCA is patent. Carotid bulb has calcific plaque. There is moderate stenosis in the left ICA (50-69%) and at the proximal segment . The left ICA has calcific plaque. The left ECA is patent. The left vertebral is antegrade. The left subclavian with biphasic waveforms. The left subclavian has turbulent flow. Carotid Measurements     Right PSV Right EDV Left PSV Left EDV   CCA Prox 109.2 cm/s        7.2 cm/s        89.4 cm/s        14.6 cm/s          CCA Dist 84.6 cm/s        10.9 cm/s        92.8 cm/s        16.7 cm/s          ICA Prox 159.4 cm/s        36.5 cm/s        156.2 cm/s        24.1 cm/s          ICA Mid 122 cm/s        21.2 cm/s        157.1 cm/s        15.4 cm/s          ICA Dist 91.3 cm/s        19 cm/s        132.7 cm/s        13 cm/s          ICA/CCA Ratio 1.9 no units         1.7 no units           .1 cm/s        8.8 cm/s        149.3 cm/s        24.9 cm/s          Vertebral 0 cm/s        0 cm/s        22.1 cm/s        13.1 cm/s          Subclavian Prox         97.4 cm/s        3.7 cm/s                                    Procedure Staff    Technologist/Clinician: Giuseppe Quinones  Supporting Staff: None  Performing Physician/Midlevel: None     Exam Completion Date/Time: 12/13/22 11:51 AM  Physical Exam:    Visit Vitals  /80   Pulse 66   Ht 5' 3\" (1.6 m)   Wt 162 lb (73.5 kg)   SpO2 99%   BMI 28.70 kg/m²   General-alert and oriented x3. No focal deficits noted. Ambulated into the room with a normal steady gait. HEENT-PERRLA exactly movements intact, no scleral icterus, mucous membranes pink and moist.  No tongue deviation to protrusion. Neck-no JVD, no carotid bruits thrills appreciated bilaterally. Heart-regular rate and rhythm no murmurs, rubs or gallops  Chest-clear to auscultation bilaterally no wheezes, rhonchi or rubs  Abdomen-soft, nontender, no palpable organomegaly or masses, no palpable pulsatile masses  Extremities-no clubbing, cyanosis or edema. No wounds or gangrenous changes to the toes or feet. Skin is intact. Feet are pink warm and well-perfused bilaterally  Pulses-carotid, radial, brachial, femoral 2+ bilaterally. Bilateral doppler signals dorsalis pedis, posterior tibial       Past Medical History:   Diagnosis Date    Allergic rhinitis     Carotid artery disease ([facility] [address] 2017    Dr Yana Ryan    Diabetes [facility])     Dr Tamiko Salmon    GERD (gastroesophageal reflux disease)     H/O cardiovascular stress test     thallium neg ef 70% (3/12);  Dr Mignon Sauer; NST neg, ef 76% (2018)    H/O echocardiogram     conc lvh, ef 60%, DD (3/12); nl lv, ef 68%, conc lvh, no wma, mild dd, no valvular abn, neg bubble study (11/17 - [facility]); nl lv, ef 72%, no wma, gr 1 dd, nl valves (8/18)    Hyperlipidemia     Hypertension     Hypovitaminosis D     Nephrolithiasis 06/2016    Dr Hayes Seen; right ureteral stone s/p EWSL    Obesity     W - 12/19    TIA (transient ischemic attack) 04/2018     Past Surgical History:   Procedure Laterality Date    HX ANKLE FRACTURE TX Left 01/2022    Dr Jhon Gallegos BREAST BIOPSY  2009    negative    HX COLONOSCOPY      Dr Jamie Pfeiffer 12/13 neg    HX ORTHOPAEDIC      DEXA t score 1.10 spine, 1.10 hip (11/13)    HX UROLOGICAL  06/10/2016    [facility]  [facility]    mri showed mod periventricular wm changes    NM CARDIAC SURG PROCEDURE UNLIST  01/2018    holter few pac/pvc, 4 beat run svt rate 156    VASCULAR SURGERY PROCEDURE UNLIST  11/2017    BICA<50%, RIGHT vertebral art obst    VASCULAR SURGERY PROCEDURE UNLIST  12/2017    CTA head/neck showed no aneurysm, <50% right intracranial ICA, diminutive right vertebral art    VASCULAR SURGERY PROCEDURE UNLIST  04/2018    RABI 1.0/LABI  1.1    VASCULAR SURGERY PROCEDURE UNLIST  04/2018    MRA right vert artery not visualized due to occlusion/stenosis, left patent, prox PAIGE 50-60% stenosis    VASCULAR SURGERY PROCEDURE UNLIST  10/2019    CTA showed 17% PAIGE, 56% prox LICA, mild RSCA sten, mod LSCA stenosis; Dr Whaley Favors     Patient Active Problem List   Diagnosis Code    Hypovitaminosis D E55.9    Dyslipidemia E78.5    Essential hypertension I10    GERD without esophagitis K21.9    Advance directive discussed with patient Z71.89    Nephrolithiasis s/p EWSL Dr Olivares Hun 6/16 N20.0    TIA (transient ischemic attack) G45.9    Symptomatic carotid artery stenosis I65.29    Type 2 diabetes with nephropathy (HCC) E11.21    Overweight with body mass index (BMI) of 25 to 25.9 in adult E66.3, Z68.25    Subclavian artery stenosis, left (HCC) I77.1     Current Outpatient Medications   Medication Sig Dispense Refill    nitrofurantoin, macrocrystal-monohydrate, (MACROBID) 100 mg capsule Take 1 Capsule by mouth two (2) times a day. Indications: bacterial urinary tract infection 14 Capsule 0    clopidogreL (PLAVIX) 75 mg tab take 1 tablet by mouth once daily 90 Tablet 3    atorvastatin (LIPITOR) 40 mg tablet take 1 tablet by mouth once daily 90 Tablet 3    carvediloL (COREG) 25 mg tablet take 1 tablet by mouth twice a day with meals 180 Tablet 3    olmesartan (BENICAR) 40 mg tablet       exenatide microspheres (Bydureon) 2 mg/0.65 mL pnij 2 mg by SubCUTAneous route every seven (7) days. diclofenac (VOLTAREN) 1 % gel Apply 4 g to affected area four (4) times daily. Left knee and left leg 5 Each 5    NOVOLIN R REGULAR U-100 INSULN 100 unit/mL injection 7 Units by SubCUTAneous route.  0    NOVOLIN N NPH U-100 INSULIN 100 unit/mL injection 30 Units. 0    folic acid/multivit-min/lutein (CENTRUM SILVER PO) Take 1 Tab by mouth daily. metFORMIN (GLUCOPHAGE) 1,000 mg tablet Take 1,000 mg by mouth two (2) times daily (with meals). amLODIPine (NORVASC) 10 mg tablet Take 10 mg by mouth daily. cholecalciferol (VITAMIN D3) (5000 Units/125 mcg) tab tablet Take 5,000 Units by mouth daily. fluticasone propionate (FLONASE) 50 mcg/actuation nasal spray 2 sprays each nostril daily (Patient not taking: No sig reported) 1 Each 11    BD Veo Insulin Syringe UF 0.3 mL 31 gauge x 15/64\" syrg USE four times a day DX.E11.65 (Patient not taking: No sig reported)       Allergies   Allergen Reactions    Lopid [Gemfibrozil] Other (comments)     elev lfts    Penicillins Shortness of Breath and Nausea and Vomiting     Social History     Socioeconomic History    Marital status:      Spouse name: Not on file    Number of children: Not on file    Years of education: Not on file    Highest education level: Not on file   Occupational History    Occupation: retired teacher [place]   Tobacco Use    Smoking status: Never    Smokeless tobacco: Never   Vaping Use    Vaping Use: Never used   Substance and Sexual Activity    Alcohol use: Not Currently     Alcohol/week: 2.0 standard drinks     Types: 1 Glasses of wine, 1 Cans of beer per week     Comment: social glass once a year    Drug use: No    Sexual activity: Not on file   Other Topics Concern    Not on file   Social History Narrative    Not on file     Social Determinants of Health     Financial Resource Strain: Not on file   Food Insecurity: Not on file   Transportation Needs: Not on file   Physical Activity: Not on file   Stress: Not on file   Social Connections: Not on file   Intimate Partner Violence: Not on file   Housing Stability: Not on file     Family History   Problem Relation Age of Onset    Diabetes Father     Hypertension Father     Cancer Sister     Hypertension Sister        Impression and Plan:  Barbara Mariano is a 67 y.o. female with asymptomatic bilateral carotid stenosis disease. History of TIA.   Patient instructed to continue present Rx -maintain daily meds as ordered including Plavix and statin medication. Patient educated on signs and symptoms of TIA/stroke. FAST  Previsit imaging reviewed, results discussed with patient. Given above findings we will continue with observation and surveillance. Patient instructed if shows no change within her disease process at this time but we will continue with close observation with repeat carotid duplex imaging in 6 months. In the meantime patient encouraged to make better lifestyle choices, better nutritional choices, increasing her daily activity as tolerated, and of course smoking cessation. Patient states she understands above, is very appreciative of our service, is willing to proceed as planned. We reviewed the plan with the patient and the patient understands.         Milda Moritz Concini, PA-C

## 2023-01-21 DIAGNOSIS — I10 ESSENTIAL HYPERTENSION: ICD-10-CM

## 2023-01-23 RX ORDER — CARVEDILOL 25 MG/1
TABLET ORAL
Qty: 180 TABLET | Refills: 3 | Status: SHIPPED | OUTPATIENT
Start: 2023-01-23

## 2023-02-04 DIAGNOSIS — E11.21 TYPE 2 DIABETES WITH NEPHROPATHY (HCC): Primary | ICD-10-CM

## 2023-02-05 DIAGNOSIS — E11.21 TYPE 2 DIABETES WITH NEPHROPATHY (HCC): Primary | ICD-10-CM

## 2023-02-06 DIAGNOSIS — E11.21 TYPE 2 DIABETES WITH NEPHROPATHY (HCC): Primary | ICD-10-CM

## 2023-03-30 ENCOUNTER — HOSPITAL ENCOUNTER (OUTPATIENT)
Facility: HOSPITAL | Age: 73
Setting detail: SPECIMEN
Discharge: HOME | End: 2023-03-30
Payer: MEDICARE

## 2023-03-30 DIAGNOSIS — E11.21 TYPE 2 DIABETES WITH NEPHROPATHY (HCC): ICD-10-CM

## 2023-03-30 LAB
ALBUMIN SERPL-MCNC: 3.5 G/DL (ref 3.4–5)
ALBUMIN/GLOB SERPL: 1 (ref 0.8–1.7)
ALP SERPL-CCNC: 126 U/L (ref 45–117)
ALT SERPL-CCNC: 20 U/L (ref 13–56)
ANION GAP SERPL CALC-SCNC: 6 MMOL/L (ref 3–18)
AST SERPL-CCNC: 10 U/L (ref 10–38)
BILIRUB SERPL-MCNC: 0.4 MG/DL (ref 0.2–1)
BUN SERPL-MCNC: 17 MG/DL (ref 7–18)
BUN/CREAT SERPL: 15 (ref 12–20)
CALCIUM SERPL-MCNC: 9.4 MG/DL (ref 8.5–10.1)
CHLORIDE SERPL-SCNC: 105 MMOL/L (ref 100–111)
CHOLEST SERPL-MCNC: 109 MG/DL
CO2 SERPL-SCNC: 27 MMOL/L (ref 21–32)
CREAT SERPL-MCNC: 1.14 MG/DL (ref 0.6–1.3)
ERYTHROCYTE [DISTWIDTH] IN BLOOD BY AUTOMATED COUNT: 13.3 % (ref 11.6–14.5)
EST. AVERAGE GLUCOSE BLD GHB EST-MCNC: 209 MG/DL
GLOBULIN SER CALC-MCNC: 3.4 G/DL (ref 2–4)
GLUCOSE SERPL-MCNC: 205 MG/DL (ref 74–99)
HBA1C MFR BLD: 8.9 % (ref 4.2–5.6)
HCT VFR BLD AUTO: 42.6 % (ref 35–45)
HDLC SERPL-MCNC: 33 MG/DL (ref 40–60)
HDLC SERPL: 3.3 (ref 0–5)
HGB BLD-MCNC: 13.7 G/DL (ref 12–16)
LDLC SERPL CALC-MCNC: 51.8 MG/DL (ref 0–100)
LIPID PANEL: ABNORMAL
MCH RBC QN AUTO: 26.9 PG (ref 24–34)
MCHC RBC AUTO-ENTMCNC: 32.2 G/DL (ref 31–37)
MCV RBC AUTO: 83.5 FL (ref 78–100)
NRBC # BLD: 0 K/UL (ref 0–0.01)
NRBC BLD-RTO: 0 PER 100 WBC
PLATELET # BLD AUTO: 298 K/UL (ref 135–420)
PMV BLD AUTO: 12.1 FL (ref 9.2–11.8)
POTASSIUM SERPL-SCNC: 4.1 MMOL/L (ref 3.5–5.5)
PROT SERPL-MCNC: 6.9 G/DL (ref 6.4–8.2)
RBC # BLD AUTO: 5.1 M/UL (ref 4.2–5.3)
SODIUM SERPL-SCNC: 138 MMOL/L (ref 136–145)
TRIGL SERPL-MCNC: 121 MG/DL
VLDLC SERPL CALC-MCNC: 24.2 MG/DL
WBC # BLD AUTO: 7.4 K/UL (ref 4.6–13.2)

## 2023-03-30 PROCEDURE — 83036 HEMOGLOBIN GLYCOSYLATED A1C: CPT

## 2023-03-30 PROCEDURE — 80061 LIPID PANEL: CPT

## 2023-03-30 PROCEDURE — 80053 COMPREHEN METABOLIC PANEL: CPT

## 2023-03-30 PROCEDURE — 85027 COMPLETE CBC AUTOMATED: CPT

## 2023-03-30 PROCEDURE — 36415 COLL VENOUS BLD VENIPUNCTURE: CPT

## 2023-04-17 ENCOUNTER — TELEPHONE (OUTPATIENT)
Age: 73
End: 2023-04-17

## 2023-04-17 NOTE — TELEPHONE ENCOUNTER
Pt calling for EKG results. Stated it was done at [address] [place] [address] Rd 04/12/23 and she was supposed to get a call with results.

## 2023-04-17 NOTE — TELEPHONE ENCOUNTER
Ekg not much changed from 2018 tracing  However, she was c/o FENT[place] so stress test has been ordered  Has she not been called to schedule?

## 2023-04-18 ENCOUNTER — TELEPHONE (OUTPATIENT)
Age: 73
End: 2023-04-18

## 2023-04-18 NOTE — TELEPHONE ENCOUNTER
Minh Troncoso is calling from central scheduling in regards to the order for the stress test. Stating the order that was entered in not correct. The code for the correct order is [id number]. Please correct so they can schedule the patient.

## 2023-04-20 NOTE — TELEPHONE ENCOUNTER
Pls call cardiology    She needs NUCLEAR stress test as REGULAR ETT will not work -- she has baseline ekg abnormalities already

## 2023-04-20 NOTE — TELEPHONE ENCOUNTER
Spoke with [employer] rep she is aware per Dr. Coco Erickson:  She needs NUCLEAR stress test as REGULAR ETT will not work -- she has baseline ekg abnormalities already

## 2023-04-21 NOTE — TELEPHONE ENCOUNTER
May 9 at [facility] for Nuclear stress test.     Scheduling called, state patient was upset about not knowing she needed a stress test.     Attempted to contact patient to see what the confusion was, and explain the procedure. Patient didn't answer will attempt again. Patient told scheduling she was going to come in here Monday morning to find out what is going on.

## 2023-05-07 PROBLEM — R65.20 SEVERE SEPSIS (HCC): Status: ACTIVE | Noted: 2023-05-07

## 2023-05-07 PROBLEM — A41.9 SEVERE SEPSIS (HCC): Status: ACTIVE | Noted: 2023-05-07

## 2023-05-12 ENCOUNTER — CARE COORDINATION (OUTPATIENT)
Facility: CLINIC | Age: 73
End: 2023-05-12

## 2023-05-12 RX ORDER — GLUCOSAMINE/D3/BOSWELLIA SERRA 1500MG-400
1 TABLET ORAL DAILY
COMMUNITY

## 2023-05-12 RX ORDER — BIMATOPROST 0.01 %
1 DROPS OPHTHALMIC (EYE) NIGHTLY
COMMUNITY
Start: 2023-04-14

## 2023-05-12 RX ORDER — EXENATIDE 2 MG/.85ML
INJECTION, SUSPENSION, EXTENDED RELEASE SUBCUTANEOUS
COMMUNITY
Start: 2023-04-03

## 2023-05-12 NOTE — CARE COORDINATION
[facility] Care Transitions Initial Follow Up Call    Call within 2 business days of discharge: Yes    Patient Current Location:  [place] [address] Transition Nurse contacted the patient by telephone to perform post hospital discharge assessment. Verified name and  with patient as identifiers. Provided introduction to self, and explanation of the Care Transition Nurse role. Patient: Etelvina Valdez Patient : 1950   MRN: [id number]  Reason for Admission: Sepsis/Uterolithiasis/UTI  Discharge Date: 23 RARS: Readmission Risk Score: 11.7      Last Discharge  Street       Date Complaint Diagnosis Description Type Department Provider    23 Dizziness; Fatigue Severe sepsis with acute organ dysfunction (HCC) . .. ED to Hosp-Admission (Discharged) (ADMITTED) Vesta Wilder MD; Shanna Olivares. .. Was this an external facility discharge? Yes, 23-23  Discharge Facility: [facility]    Challenges to be reviewed by the provider   Additional needs identified to be addressed with provider: No  none               Method of communication with provider: chart routing. Patient reported having a little bit of right sided lower back pain. Relates this to stent. Patient also reported burning with urination, urinary urgency and frequency. Patient voiced she was told this is to be expected. Patient has started all new meds with exception of Cipro 250 mg which is to be started after completion of Cipro 500 mg (on 23). Care Transition Nurse reviewed discharge instructions and red flags with patient who verbalized understanding. The patient was given an opportunity to ask questions and does not have any further questions or concerns at this time. Were discharge instructions available to patient? Yes. Reviewed appropriate site of care based on symptoms and resources available to patient including: PCP  Specialist  When to call 911  CTN .  The patient agrees to

## 2023-05-16 ENCOUNTER — OFFICE VISIT (OUTPATIENT)
Age: 73
End: 2023-05-16

## 2023-05-16 VITALS
TEMPERATURE: 97.1 F | SYSTOLIC BLOOD PRESSURE: 117 MMHG | OXYGEN SATURATION: 98 % | HEIGHT: 63 IN | DIASTOLIC BLOOD PRESSURE: 74 MMHG | WEIGHT: 164 LBS | HEART RATE: 77 BPM | RESPIRATION RATE: 17 BRPM | BODY MASS INDEX: 29.06 KG/M2

## 2023-05-16 DIAGNOSIS — N20.1 URETERAL STONE: ICD-10-CM

## 2023-05-16 DIAGNOSIS — E11.21 TYPE 2 DIABETES WITH NEPHROPATHY (HCC): ICD-10-CM

## 2023-05-16 DIAGNOSIS — N30.00 ACUTE CYSTITIS WITHOUT HEMATURIA: ICD-10-CM

## 2023-05-16 DIAGNOSIS — I10 ESSENTIAL HYPERTENSION: ICD-10-CM

## 2023-05-16 DIAGNOSIS — A41.9 SEPSIS, DUE TO UNSPECIFIED ORGANISM, UNSPECIFIED WHETHER ACUTE ORGAN DYSFUNCTION PRESENT (HCC): Primary | ICD-10-CM

## 2023-05-16 PROBLEM — R65.20 SEVERE SEPSIS (HCC): Status: RESOLVED | Noted: 2023-05-07 | Resolved: 2023-05-16

## 2023-05-16 RX ORDER — PEN NEEDLE, DIABETIC 32GX 5/32"
NEEDLE, DISPOSABLE MISCELLANEOUS
COMMUNITY
Start: 2023-05-12

## 2023-05-16 SDOH — ECONOMIC STABILITY: HOUSING INSECURITY
IN THE LAST 12 MONTHS, WAS THERE A TIME WHEN YOU DID NOT HAVE A STEADY PLACE TO SLEEP OR SLEPT IN A SHELTER (INCLUDING NOW)?: NO

## 2023-05-16 SDOH — ECONOMIC STABILITY: INCOME INSECURITY: HOW HARD IS IT FOR YOU TO PAY FOR THE VERY BASICS LIKE FOOD, HOUSING, MEDICAL CARE, AND HEATING?: NOT HARD AT ALL

## 2023-05-16 SDOH — ECONOMIC STABILITY: FOOD INSECURITY: WITHIN THE PAST 12 MONTHS, THE FOOD YOU BOUGHT JUST DIDN'T LAST AND YOU DIDN'T HAVE MONEY TO GET MORE.: NEVER TRUE

## 2023-05-16 SDOH — ECONOMIC STABILITY: FOOD INSECURITY: WITHIN THE PAST 12 MONTHS, YOU WORRIED THAT YOUR FOOD WOULD RUN OUT BEFORE YOU GOT MONEY TO BUY MORE.: NEVER TRUE

## 2023-05-16 ASSESSMENT — PATIENT HEALTH QUESTIONNAIRE - PHQ9
2. FEELING DOWN, DEPRESSED OR HOPELESS: 0
SUM OF ALL RESPONSES TO PHQ QUESTIONS 1-9: 0
1. LITTLE INTEREST OR PLEASURE IN DOING THINGS: 0
SUM OF ALL RESPONSES TO PHQ QUESTIONS 1-9: 0
SUM OF ALL RESPONSES TO PHQ9 QUESTIONS 1 & 2: 0

## 2023-05-16 NOTE — PROGRESS NOTES
Melissa Ayala presents today for   Chief Complaint   Patient presents with    Follow-Up from CEASAR ALONZO     5-6-23/5-11-23 @ [facility]     Blood Infection     Severe Sepsis with acute organ failure        1. \"Have you been to the ER, urgent care clinic since your last visit? Hospitalized since your last visit? \" Yes   5-6/11-23 @ [facility]       2. \"Have you seen or consulted any other health care providers outside of the [address] [place] [address] since your last visit? \" no     3. For patients aged 39-70: Has the patient had a colonoscopy / FIT/ Cologuard? Yes - no Care Gap present      If the patient is female:    4. For patients aged 41-77: Has the patient had a mammogram within the past 2 years? NA - based on age or sex      11. For patients aged 21-65: Has the patient had a pap smear?  NA - based on age or sex
01/2022    Dr Destiny Garcia  2009    negative    COLONOSCOPY      Dr Elva Felder 12/13 neg    CYSTOSCOPY Right 5/7/2023    CYSTOSCOPY, RIGHT URETERAL STENT INSERTION, RIGHT RETROGRADE PYELOGRAM performed by Gwyn Corado MD at [facility]    LITHOTRIPSY Right 06/10/2016    Dr. Joanna Hernández- [facility] [address] 51  11/2017    mri showed mod periventricular wm changes    ORTHOPEDIC SURGERY      DEXA t score 1.10 spine, 1.10 hip (11/13)    VASCULAR SURGERY  10/2019    CTA showed 87% GAMALIEL, 76% prox LICA, mild RSCA sten, mod LSCA stenosis; Dr Daria Teague  04/2018    MRA right vert artery not visualized due to occlusion/stenosis, left patent, prox GAMALIEL 50-60% stenosis    VASCULAR SURGERY  11/2017    BICA<50%, RIGHT vertebral art obst    VASCULAR SURGERY  04/2018    RABI 1.0/LABI  1.1    VASCULAR SURGERY  12/2017    CTA head/neck showed no aneurysm, <50% right intracranial ICA, diminutive right vertebral art     Current Outpatient Medications   Medication Sig    LUMIGAN 0.01 % SOLN ophthalmic drops Place 1 drop into both eyes nightly    Biotin 71870 MCG TABS Take 1 tablet by mouth daily    tamsulosin (FLOMAX) 0.4 MG capsule Take 2 capsules by mouth daily    trospium (SANCTURA) 20 MG tablet Take 1 tablet by mouth 2 times daily (before meals)    [START ON 5/21/2023] ciprofloxacin (CIPRO) 250 MG tablet Take 1 tablet by mouth 2 times daily for 7 days    amLODIPine (NORVASC) 10 MG tablet Take 1 tablet by mouth daily    atorvastatin (LIPITOR) 40 MG tablet take 1 tablet by mouth once daily    carvedilol (COREG) 25 MG tablet take 1 tablet by mouth twice a day with meals    vitamin D3 (CHOLECALCIFEROL) 125 MCG (5000 UT) TABS tablet Take 1 tablet by mouth daily    clopidogrel (PLAVIX) 75 MG tablet take 1 tablet by mouth once daily    diclofenac sodium (VOLTAREN) 1 % GEL Apply 4 g topically 4 times daily    fluticasone (FLONASE) 50 MCG/ACT nasal spray 2 sprays each nostril daily    DROPLET PEN

## 2023-05-17 RX ORDER — ATORVASTATIN CALCIUM 40 MG/1
TABLET, FILM COATED ORAL
Qty: 90 TABLET | Refills: 2 | Status: SHIPPED | OUTPATIENT
Start: 2023-05-17

## 2023-05-19 ENCOUNTER — CARE COORDINATION (OUTPATIENT)
Facility: CLINIC | Age: 73
End: 2023-05-19

## 2023-05-19 NOTE — CARE COORDINATION
[address] Care Transitions Follow Up Call    Patient Current Location:  [facility] Transition Nurse contacted the patient by telephone to follow up after admission on 23. Verified name and  with patient as identifiers. Patient: Cliff Choi  Patient : 1950   MRN: [id number]  Reason for Admission: Sepsis/Uterolithiasis/UTI  Discharge Date: 23 RARS: Readmission Risk Score: 11.7      Needs to be reviewed by the provider   Additional needs identified to be addressed with provider: No  Patient reported she has not been checking blood sugar. Patient voiced she has not placed new sensor on arm since discharge from hospital. CTN educated patient on the importance of monitoring for hyper- and/or hypoglycemia while taking insulin. Method of communication with provider: none. Patient reported right sided is a little better. Patient voiced she taking Cipro 500 mg and will start Cipro 250 on . Patient voiced she notified Dr. Yas Euceda office of change to diabetic regimen. Patient denied further issues with Humalog injections however admitted she has not applied new sensor since discharge from the hospital. CTN educated patient on the importance of monitoring blood sugar while taking insulin. Patient voiced her intent to place new sensor today. Patient verbalized she has not received call from urology to schedule appt for kidney stone. Addressed changes since last contact:  none   Did patient attend post hospital follow up?: Yes. Was follow up appointment scheduled within 7 days of discharge? Yes.       Follow Up  Future Appointments   Date Time Provider Shelly Gutierrez   2023  1:20 PM Chance Best PA-C [facility] Sched   2023 10:00 AM [facility] HV IMAGING 2 [facility] BS AMB   2023 11:20 AM [facility] LAB VISIT IO BS AMB   2023  9:00 AM Tom Rivera MD [facility] BS AMB   10/4/2023  9:45 AM [facility] MARILIA Shipley [facility] [place] Sched   10/4/2023 10:00 AM Jacoby CRISTOBAL

## 2023-05-26 ENCOUNTER — CARE COORDINATION (OUTPATIENT)
Facility: CLINIC | Age: 73
End: 2023-05-26

## 2023-05-26 NOTE — CARE COORDINATION
[facility] Care Transitions Follow Up Attempt        Care Transitions Nurse (CTN) attempted to contact the patient by telephone to perform post hospital discharge assessment. Unable to reach. Left [facility] requesting a return call. Patient: Tiffanie Díaz  Patient : 1950   MRN: [id number]  Reason for Admission: Sepsis/Uterolithiasis/UTI  Discharge Date: 23 RARS: Readmission Risk Score: 11.7          Changes since last contact:  medications-Macrobid MWF prescribed today for UTI prevention  Was follow up appointment scheduled within 7 days from discharge? Yes  Did patient attend follow up appointment? Yes, with PCP on 23 and urology on 23      Follow Up  Future Appointments   Date Time Provider Shelly Gutierrez   2023 10:00 AM [facility] HV IMAGING 2 [facility] BS AMB   2023 11:20 AM [facility] LAB VISIT [facility]OC BS AMB   2023  9:00 AM Tom Stern MD IO BS AMB   10/4/2023  9:45 AM [facility] MARILIA Barros [facility] [place] Sched   10/4/2023 10:00 AM Jesu Lai PA-C [facility] Sched   10/12/2023  9:35 AM [facility] LAB VISIT IOC BS AMB   10/19/2023  9:40 AM Mando Noguera MD [facility] BS AMB     Non-BS follow up appointment(s): none      Advance Care Planning:   decision maker updated and referral to internal ACP facilitator by Keith Garduno. .     Patients top risk factors for readmission:  medical condition-nephrolithiasis/diabetes      Care Transition Nurse provided contact information for future needs. Plan to handoff to Keith Garduno for future outreach.   Plan for next call: symptom management-assess for nephrolithiasis red flags  self management-placement of CGM sensor and BG readings  medication management-verify that patient picked up KELSIE Riddle

## 2023-06-05 ENCOUNTER — TELEPHONE (OUTPATIENT)
Age: 73
End: 2023-06-05

## 2023-06-05 NOTE — TELEPHONE ENCOUNTER
Bonifacio Buchanan from [facility] called and wanted to schedule this patient for clearance. No date yet for the procedure as she wanted to know if we are able to clear patient. Informed her will check the BRENDAN Del Real who last saw her. Will call her back at 21 [phone] ext. [phone].

## 2023-06-09 ENCOUNTER — CARE COORDINATION (OUTPATIENT)
Facility: CLINIC | Age: 73
End: 2023-06-09

## 2023-06-09 NOTE — CARE COORDINATION
[facility] Care Transitions Follow Up Call    Patient Current Location:  [facility] Transition Nurse contacted the patient by telephone to follow up after admission on 23. Verified name and  with patient as identifiers. Patient: Albina Matthews  Patient : 1950   MRN: [id number]  Reason for Admission: Sepsis/Uterolithiasis/UTI  Discharge Date: 23 RARS: Readmission Risk Score: 11.7      Needs to be reviewed by the provider   Additional needs identified to be addressed with provider: Yes  Patient reported red urine X 1 week. Did reported episode of N/V but attributed this to pizza that did not agree with her. Denied fever, chills. Reported occasional lower abd cramps but nit everyday. Patient reported burning with urination but voiced this may be due to stent. Method of communication with provider: chart routing. Patient reported she has received blood glucose sensors and everything is fine. Addressed changes since last contact:  none   Did patient attend post hospital follow up?: Yes. Was follow up appointment scheduled within 7 days of discharge? Yes. Follow Up  Future Appointments   Date Time Provider Shelly Gutierrez   2023  8:30 AM [facility] NM RM 2 MMCRNM [facility]   2023  9:30 AM [facility] NUC CARD/TREADMILL ROOM MMCNINV [facility]   2023 10:00 AM [facility] HV IMAGING 2 [facility] BS AMB   2023 11:00 AM David Bearden PA-C [facility] BS AMB   2023 11:20 AM IOC LAB VISIT IOC BS AMB   2023  9:00 AM Tom Acosta MD IO BS AMB   10/4/2023  9:45 AM [facility] [place] Festus Leventhal [facility] [place] Sched   10/4/2023 10:00 AM Asha Johnston PA-C [facility] [place] Sched   10/12/2023  9:35 AM IOC LAB VISIT IO BS AMB   10/19/2023  9:40 AM Oral Brown MD IO BS AMB     Non[facility] follow up appointment(s): none    Care Transition Nurse reviewed red flags with patient and discussed any barriers to care and/or understanding of plan of care after discharge.  Discussed appropriate site of

## 2023-06-21 ENCOUNTER — TELEPHONE (OUTPATIENT)
Age: 73
End: 2023-06-21

## 2023-06-21 ENCOUNTER — OFFICE VISIT (OUTPATIENT)
Age: 73
End: 2023-06-21
Payer: MEDICARE

## 2023-06-21 VITALS
DIASTOLIC BLOOD PRESSURE: 88 MMHG | HEIGHT: 63 IN | BODY MASS INDEX: 28.53 KG/M2 | SYSTOLIC BLOOD PRESSURE: 138 MMHG | OXYGEN SATURATION: 97 % | WEIGHT: 161 LBS | HEART RATE: 75 BPM

## 2023-06-21 DIAGNOSIS — Z01.810 PREOP CARDIOVASCULAR EXAM: ICD-10-CM

## 2023-06-21 DIAGNOSIS — R93.1 ABNORMAL NUCLEAR CARDIAC IMAGING TEST: Primary | ICD-10-CM

## 2023-06-21 PROCEDURE — 3075F SYST BP GE 130 - 139MM HG: CPT | Performed by: INTERNAL MEDICINE

## 2023-06-21 PROCEDURE — 3079F DIAST BP 80-89 MM HG: CPT | Performed by: INTERNAL MEDICINE

## 2023-06-21 PROCEDURE — 1090F PRES/ABSN URINE INCON ASSESS: CPT | Performed by: INTERNAL MEDICINE

## 2023-06-21 PROCEDURE — G8399 PT W/DXA RESULTS DOCUMENT: HCPCS | Performed by: INTERNAL MEDICINE

## 2023-06-21 PROCEDURE — 1036F TOBACCO NON-USER: CPT | Performed by: INTERNAL MEDICINE

## 2023-06-21 PROCEDURE — G8428 CUR MEDS NOT DOCUMENT: HCPCS | Performed by: INTERNAL MEDICINE

## 2023-06-21 PROCEDURE — 3017F COLORECTAL CA SCREEN DOC REV: CPT | Performed by: INTERNAL MEDICINE

## 2023-06-21 PROCEDURE — 1123F ACP DISCUSS/DSCN MKR DOCD: CPT | Performed by: INTERNAL MEDICINE

## 2023-06-21 PROCEDURE — G8417 CALC BMI ABV UP PARAM F/U: HCPCS | Performed by: INTERNAL MEDICINE

## 2023-06-21 PROCEDURE — 99204 OFFICE O/P NEW MOD 45 MIN: CPT | Performed by: INTERNAL MEDICINE

## 2023-06-21 RX ORDER — INSULIN LISPRO 100 [IU]/ML
INJECTION, SOLUTION INTRAVENOUS; SUBCUTANEOUS
COMMUNITY
Start: 2023-06-14

## 2023-06-21 ASSESSMENT — PATIENT HEALTH QUESTIONNAIRE - PHQ9
SUM OF ALL RESPONSES TO PHQ QUESTIONS 1-9: 0
2. FEELING DOWN, DEPRESSED OR HOPELESS: 0
1. LITTLE INTEREST OR PLEASURE IN DOING THINGS: 0
SUM OF ALL RESPONSES TO PHQ QUESTIONS 1-9: 0
SUM OF ALL RESPONSES TO PHQ9 QUESTIONS 1 & 2: 0

## 2023-06-21 ASSESSMENT — ANXIETY QUESTIONNAIRES
4. TROUBLE RELAXING: 0
7. FEELING AFRAID AS IF SOMETHING AWFUL MIGHT HAPPEN: 0
1. FEELING NERVOUS, ANXIOUS, OR ON EDGE: 0
2. NOT BEING ABLE TO STOP OR CONTROL WORRYING: 0
GAD7 TOTAL SCORE: 0
3. WORRYING TOO MUCH ABOUT DIFFERENT THINGS: 0
5. BEING SO RESTLESS THAT IT IS HARD TO SIT STILL: 0
6. BECOMING EASILY ANNOYED OR IRRITABLE: 0

## 2023-06-21 NOTE — PROGRESS NOTES
Bethann Leventhal presents today for   Chief Complaint   Patient presents with    New Patient     Referred by pcp for surgical clearance and abnormal stress test    Procedure     7/7/23: kidney stone removal with Dr. Tatiana Baird [facility]       Bethann Leventhal preferred language for health care discussion is english/other. Is someone accompanying this pt? yes    Is the patient using any DME equipment during OV? no    Depression Screening:  Depression: Not at risk    PHQ-2 Score: 0        Learning Assessment:  Who is the primary learner? Patient    What is the preferred language for health care of the primary learner? ENGLISH    How does the primary learner prefer to learn new concepts? DEMONSTRATION    Answered By patient    Relationship to Learner SELF           Pt currently taking Anticoagulant therapy? no    Pt currently taking Antiplatelet therapy ? no      Coordination of Care:  1. Have you been to the ER, urgent care clinic since your last visit? Hospitalized since your last visit? no    2. Have you seen or consulted any other health care providers outside of the [address] [place] [address] since your last visit? Include any pap smears or colon screening.  no
Last Year: No      FH: n/a    Review of Systems    14 pt Review of Systems is negative unless otherwise mentioned in the HPI. Wt Readings from Last 3 Encounters:   06/21/23 161 lb (73 kg)   06/13/23 163 lb (73.9 kg)   06/09/23 163 lb (73.9 kg)     Temp Readings from Last 3 Encounters:   06/09/23 97.3 °F (36.3 °C) (Temporal)   05/16/23 97.1 °F (36.2 °C) (Temporal)   05/11/23 97.3 °F (36.3 °C) (Temporal)     BP Readings from Last 3 Encounters:   06/21/23 138/88   06/13/23 134/82   06/09/23 137/70     Pulse Readings from Last 3 Encounters:   06/21/23 75   06/13/23 77   06/09/23 63       Physical Exam:    /88 (Site: Left Upper Arm, Position: Sitting, Cuff Size: Medium Adult)   Pulse 75   Ht 5' 3\" (1.6 m)   Wt 161 lb (73 kg)   LMP  (LMP Unknown)   SpO2 97%   BMI 28.52 kg/m²    Physical Exam  Vitals and nursing note reviewed. Constitutional:       General: She is not in acute distress. Appearance: Normal appearance. HENT:      Head: Normocephalic. Nose: Nose normal.      Mouth/Throat:      Mouth: Mucous membranes are moist.   Eyes:      Extraocular Movements: Extraocular movements intact. Pupils: Pupils are equal, round, and reactive to light. Neck:      Vascular: No carotid bruit. Cardiovascular:      Rate and Rhythm: Normal rate and regular rhythm. Pulses: Normal pulses. Heart sounds: No murmur heard. No friction rub. No gallop. Pulmonary:      Effort: Pulmonary effort is normal.      Breath sounds: Normal breath sounds. No wheezing or rales. Abdominal:      Palpations: Abdomen is soft. Musculoskeletal:      Cervical back: Neck supple. Right lower leg: No edema. Left lower leg: No edema. Skin:     General: Skin is warm and dry. Neurological:      General: No focal deficit present. Mental Status: She is alert and oriented to person, place, and time.    Psychiatric:         Mood and Affect: Mood normal.       EKG lateral ischemic changes- priors

## 2023-06-22 DIAGNOSIS — I65.23 OCCLUSION AND STENOSIS OF BILATERAL CAROTID ARTERIES: Primary | ICD-10-CM

## 2023-06-22 NOTE — PROGRESS NOTES
Order for bilateral carotid placed per verbal order from Atlanticginette Nowak, [address]

## 2023-06-23 ENCOUNTER — OFFICE VISIT (OUTPATIENT)
Age: 73
End: 2023-06-23
Payer: MEDICARE

## 2023-06-23 VITALS
HEIGHT: 63 IN | WEIGHT: 160.94 LBS | DIASTOLIC BLOOD PRESSURE: 72 MMHG | OXYGEN SATURATION: 98 % | SYSTOLIC BLOOD PRESSURE: 130 MMHG | HEART RATE: 76 BPM | BODY MASS INDEX: 28.52 KG/M2

## 2023-06-23 DIAGNOSIS — I65.23 ASYMPTOMATIC BILATERAL CAROTID ARTERY STENOSIS: Primary | ICD-10-CM

## 2023-06-23 PROCEDURE — 99214 OFFICE O/P EST MOD 30 MIN: CPT | Performed by: PHYSICIAN ASSISTANT

## 2023-06-23 PROCEDURE — 1090F PRES/ABSN URINE INCON ASSESS: CPT | Performed by: PHYSICIAN ASSISTANT

## 2023-06-23 PROCEDURE — 3078F DIAST BP <80 MM HG: CPT | Performed by: PHYSICIAN ASSISTANT

## 2023-06-23 PROCEDURE — G8427 DOCREV CUR MEDS BY ELIG CLIN: HCPCS | Performed by: PHYSICIAN ASSISTANT

## 2023-06-23 PROCEDURE — 1123F ACP DISCUSS/DSCN MKR DOCD: CPT | Performed by: PHYSICIAN ASSISTANT

## 2023-06-23 PROCEDURE — G8399 PT W/DXA RESULTS DOCUMENT: HCPCS | Performed by: PHYSICIAN ASSISTANT

## 2023-06-23 PROCEDURE — 3075F SYST BP GE 130 - 139MM HG: CPT | Performed by: PHYSICIAN ASSISTANT

## 2023-06-23 PROCEDURE — 3017F COLORECTAL CA SCREEN DOC REV: CPT | Performed by: PHYSICIAN ASSISTANT

## 2023-06-23 PROCEDURE — 1036F TOBACCO NON-USER: CPT | Performed by: PHYSICIAN ASSISTANT

## 2023-06-23 PROCEDURE — G8417 CALC BMI ABV UP PARAM F/U: HCPCS | Performed by: PHYSICIAN ASSISTANT

## 2023-06-23 ASSESSMENT — PATIENT HEALTH QUESTIONNAIRE - PHQ9
SUM OF ALL RESPONSES TO PHQ QUESTIONS 1-9: 0
SUM OF ALL RESPONSES TO PHQ QUESTIONS 1-9: 0
1. LITTLE INTEREST OR PLEASURE IN DOING THINGS: 0
SUM OF ALL RESPONSES TO PHQ9 QUESTIONS 1 & 2: 0
SUM OF ALL RESPONSES TO PHQ QUESTIONS 1-9: 0
SUM OF ALL RESPONSES TO PHQ QUESTIONS 1-9: 0
2. FEELING DOWN, DEPRESSED OR HOPELESS: 0

## 2023-06-23 NOTE — PROGRESS NOTES
23        Cathie Walden        History and Physical    Cheryle Mayo is a pleasant 67 y.o. female There were no encounter diagnoses. Patient presents today to get clearance to stop. Anticoagulation meds for a urologic procedure scheduled Monday. Patient states that she was seen by urology to have her bilateral carotids rechecked, and to get permission to stop her Plavix for her upcoming procedure. On this visit patient denies any recent signs of TIA or stroke. Denies any recent memory loss, loss for searching for words, photo disturbances, unilateral or bilateral weaknesses. She is continue take her daily meds as recommended including Plavix and statin medication. She denies any chest pain or shortness of breath. Denies any dyspnea on exertion. Reports a very good appetite with no nausea vomiting. Stated patient is presently scheduled for urologic procedure at the end of the month    T  Patient Name   Cheryle Myao MRN   [id number] Legal Sex   Female      1950 (67 y.o.)     In Basket Actions     Reviewed   Result Note   View in In Basket       Vascular History    Vascular duplex carotid bilateral (Order #[id number]) on 23     Reason for Exam  Priority: Routine  PVD: Peripheral Vascular DZ, Unspecified   Dx: Occlusion and stenosis of bilateral carotid arteries [I65.23 (ICD-10-CM)]     Vitals    Weight Height BSA (Calculated - sq m) BP Pulse            PACS Images     Show images for Vascular duplex carotid bilateral    Interpretation Summary         Moderate (50-69%) stenosis in the right internal carotid artery. Calcific and irregular plaque in the right internal carotid artery. Moderate (50-69%) stenosis in the left internal carotid artery. Calcific and irregular plaque in the left internal carotid artery. Right vertebral artery signal is not detected suggesting occlusion.     Left vertebral artery is antegrade, with an early deceleration suggesting pre steal.

## 2023-06-23 NOTE — PROGRESS NOTES
1. Have you been to the ER, urgent care clinic since your last visit? Yes/ [place]      Hospitalized since your last visit? Yes/  6 days     2. Have you seen or consulted any other health care providers outside of the [address] [place] [address] since your last visit? Include any pap smears or colon screening.  No

## 2023-06-30 ENCOUNTER — TELEPHONE (OUTPATIENT)
Age: 73
End: 2023-06-30

## 2023-07-14 RX ORDER — CLOPIDOGREL BISULFATE 75 MG/1
TABLET ORAL
Qty: 90 TABLET | Refills: 3 | Status: SHIPPED | OUTPATIENT
Start: 2023-07-14

## 2023-07-14 NOTE — TELEPHONE ENCOUNTER
PCP:  Caterina Reid MD    Last appt: [unfilled]  Future Appointments   Date Time Provider [address]   7/24/2023 10:40 AM Jennifer Diaz, APRN - NP [facility] Sched   8/9/2023 11:20 AM [facility] LAB VISIT [facility] BS AMB   8/14/2023  8:30 AM Lorna Bailey, APRN - NP [facility] AMB   8/16/2023  9:00 AM Caterina Reid MD [facility] BS AMB   10/4/2023  9:45 AM [facility] [place] Sched   10/4/2023 10:00 AM Lavon King PA-C [facility] [place] Sched   10/12/2023  9:35 AM I[facility] LAB VISIT [facility] BS AMB   10/19/2023  9:40 AM Caterina Reid MD [facility] BS AMB   12/21/2023  8:00 AM Tabby Canas DO [facility] AMB       Requested Prescriptions     Pending Prescriptions Disp Refills    clopidogrel (PLAVIX) 75 MG tablet [Pharmacy Med Name: CLOPIDOGREL 75 MG TABLET] 90 tablet 3     Sig: take 1 tablet by mouth once daily

## 2023-08-01 NOTE — PROGRESS NOTES
Manuel Godoy presents today for a 2 month follow-up to discuss the timing of performing a left heart cath. She had a nuclear stress test done on 4/13/23 due to complaints of [place]. It was noted to have a moderate severity LV stress perfusion defect in the inferolateral segment that was partially reversible, no TID. Prior to seeing Dr. Kailey Cota, she was admitted to [facility] for urosepsis with ureteral stone and right hydronephrosis. When seen by Dr. Kailey Cota on 6/21/23, she was given clearance to proceed with ureteral stone removal and stent placement prior to performing the cath as she was experiencing hematuria and flank pain. She underwent her urological procedure on 7/5/23 and has been doing well. Cardiac-wise, she states tht she has not had any chest pain or palpitations but continues to experience dyspnea on exertion. No orthopnea or PND. She is a 67year old female with history of carotid disease and history of TIA. Her last echo was done in Nov. 2017 and it showed an EF of 68%, no regional wall motion abnormalities, no hemodynamically significant valve flow abnormalities, injection of saline contrast was negative for interatrial shunting. Manuel Godoy is a 67 y.o. female presents today for :  Chief Complaint   Patient presents with    Follow-up     1 month f/u     Shortness of Breath     SOB with exertion        PMH:  Past Medical History:   Diagnosis Date    Allergic rhinitis     Carotid artery disease ([address] ) 2017    Dr Rene Valle    DM (diabetes mellitus) ([facility])     Dr Norman Walker    GERD (gastroesophageal reflux disease)     H/O cardiovascular stress test     thallium neg ef 70% (3/12);  Dr Khoi Pabon; NST neg, ef 76% (2018); NST mod risk, ef 50%, inferolat isch, TID 1.12 (6/23)    H/O echocardiogram     conc lvh, ef 60%, DD (3/12); nl lv, ef 68%, conc lvh, no wma, mild dd, no valvular abn, neg bubble study (11/17 - [facility]); nl lv, ef 72%, no wma, gr 1 dd, nl valves (8/18)    History of Holter

## 2023-08-09 ENCOUNTER — HOSPITAL ENCOUNTER (OUTPATIENT)
Facility: HOSPITAL | Age: 73
Setting detail: SPECIMEN
Discharge: HOME | End: 2023-08-12
Payer: MEDICARE

## 2023-08-09 DIAGNOSIS — Z79.4 TYPE 2 DIABETES MELLITUS WITH DIABETIC NEPHROPATHY, WITH LONG-TERM CURRENT USE OF INSULIN (HCC): ICD-10-CM

## 2023-08-09 DIAGNOSIS — E11.21 TYPE 2 DIABETES MELLITUS WITH DIABETIC NEPHROPATHY, WITH LONG-TERM CURRENT USE OF INSULIN (HCC): ICD-10-CM

## 2023-08-09 LAB
ANION GAP SERPL CALC-SCNC: 6 MMOL/L (ref 3–18)
BUN SERPL-MCNC: 12 MG/DL (ref 7–18)
BUN/CREAT SERPL: 12 (ref 12–20)
CALCIUM SERPL-MCNC: 8.7 MG/DL (ref 8.5–10.1)
CHLORIDE SERPL-SCNC: 105 MMOL/L (ref 100–111)
CO2 SERPL-SCNC: 31 MMOL/L (ref 21–32)
CREAT SERPL-MCNC: 1.02 MG/DL (ref 0.6–1.3)
CREAT UR-MCNC: 160 MG/DL (ref 30–125)
GLUCOSE SERPL-MCNC: 107 MG/DL (ref 74–99)
HBA1C MFR BLD: 9.5 % (ref 4.2–5.6)
MICROALBUMIN UR-MCNC: 7.83 MG/DL (ref 0–3)
MICROALBUMIN/CREAT UR-RTO: 49 MG/G (ref 0–30)
POTASSIUM SERPL-SCNC: 3.6 MMOL/L (ref 3.5–5.5)
SODIUM SERPL-SCNC: 142 MMOL/L (ref 136–145)

## 2023-08-09 PROCEDURE — 83036 HEMOGLOBIN GLYCOSYLATED A1C: CPT

## 2023-08-09 PROCEDURE — 82570 ASSAY OF URINE CREATININE: CPT

## 2023-08-09 PROCEDURE — 82043 UR ALBUMIN QUANTITATIVE: CPT

## 2023-08-09 PROCEDURE — 36415 COLL VENOUS BLD VENIPUNCTURE: CPT

## 2023-08-09 PROCEDURE — 80048 BASIC METABOLIC PNL TOTAL CA: CPT

## 2023-08-14 ENCOUNTER — OFFICE VISIT (OUTPATIENT)
Age: 73
End: 2023-08-14
Payer: MEDICARE

## 2023-08-14 VITALS
BODY MASS INDEX: 29.95 KG/M2 | HEART RATE: 61 BPM | OXYGEN SATURATION: 99 % | HEIGHT: 63 IN | WEIGHT: 169 LBS | SYSTOLIC BLOOD PRESSURE: 128 MMHG | DIASTOLIC BLOOD PRESSURE: 70 MMHG

## 2023-08-14 DIAGNOSIS — I10 ESSENTIAL HYPERTENSION: ICD-10-CM

## 2023-08-14 DIAGNOSIS — R93.1 ABNORMAL NUCLEAR CARDIAC IMAGING TEST: ICD-10-CM

## 2023-08-14 DIAGNOSIS — Z01.818 PRE-OP TESTING: Primary | ICD-10-CM

## 2023-08-14 DIAGNOSIS — G45.9 TIA (TRANSIENT ISCHEMIC ATTACK): ICD-10-CM

## 2023-08-14 PROCEDURE — 1036F TOBACCO NON-USER: CPT | Performed by: NURSE PRACTITIONER

## 2023-08-14 PROCEDURE — 3074F SYST BP LT 130 MM HG: CPT | Performed by: NURSE PRACTITIONER

## 2023-08-14 PROCEDURE — G8417 CALC BMI ABV UP PARAM F/U: HCPCS | Performed by: NURSE PRACTITIONER

## 2023-08-14 PROCEDURE — 1090F PRES/ABSN URINE INCON ASSESS: CPT | Performed by: NURSE PRACTITIONER

## 2023-08-14 PROCEDURE — 3078F DIAST BP <80 MM HG: CPT | Performed by: NURSE PRACTITIONER

## 2023-08-14 PROCEDURE — G8399 PT W/DXA RESULTS DOCUMENT: HCPCS | Performed by: NURSE PRACTITIONER

## 2023-08-14 PROCEDURE — 3017F COLORECTAL CA SCREEN DOC REV: CPT | Performed by: NURSE PRACTITIONER

## 2023-08-14 PROCEDURE — 99214 OFFICE O/P EST MOD 30 MIN: CPT | Performed by: NURSE PRACTITIONER

## 2023-08-14 PROCEDURE — 1123F ACP DISCUSS/DSCN MKR DOCD: CPT | Performed by: NURSE PRACTITIONER

## 2023-08-14 PROCEDURE — G8427 DOCREV CUR MEDS BY ELIG CLIN: HCPCS | Performed by: NURSE PRACTITIONER

## 2023-08-14 ASSESSMENT — ENCOUNTER SYMPTOMS
NAUSEA: 0
DIARRHEA: 0
ABDOMINAL DISTENTION: 0
CHEST TIGHTNESS: 0
VOMITING: 0
BLOOD IN STOOL: 0
CONSTIPATION: 0
COUGH: 0
WHEEZING: 0

## 2023-08-14 ASSESSMENT — PATIENT HEALTH QUESTIONNAIRE - PHQ9
1. LITTLE INTEREST OR PLEASURE IN DOING THINGS: 0
2. FEELING DOWN, DEPRESSED OR HOPELESS: 0
SUM OF ALL RESPONSES TO PHQ QUESTIONS 1-9: 0
SUM OF ALL RESPONSES TO PHQ9 QUESTIONS 1 & 2: 0
SUM OF ALL RESPONSES TO PHQ QUESTIONS 1-9: 0

## 2023-08-14 NOTE — PROGRESS NOTES
Patricia Lemos presents today for   Chief Complaint   Patient presents with    Follow-up     1 month f/u     Shortness of Breath     SOB with exertion        Patricia Lemos preferred language for health care discussion is english/other. Is someone accompanying this pt? no    Is the patient using any DME equipment during OV? no    Depression Screening:  Depression: Not at risk    PHQ-2 Score: 0        Learning Assessment:  Who is the primary learner? Patient    What is the preferred language for health care of the primary learner? ENGLISH    How does the primary learner prefer to learn new concepts? DEMONSTRATION    Answered By patient    Relationship to Learner SELF           Pt currently taking Anticoagulant therapy? no    Pt currently taking Antiplatelet therapy ? Plavix 75 mg 1x daily       Coordination of Care:  1. Have you been to the ER, urgent care clinic since your last visit? Hospitalized since your last visit? no    2. Have you seen or consulted any other health care providers outside of the [address] [place] [address] since your last visit? Include any pap smears or colon screening.  no

## 2023-08-14 NOTE — PATIENT INSTRUCTIONS
Catheterization Instructions       You are scheduled to have a Left heart cath   on August 24, 2023  at 1030. Please check in at 0900 . Please go to [facility] and park in the outpatient parking lot that is located around to the back of the hospital and enter through the [facility] building. Once you enter through the [facility] check in with the  there. The  will either give you directions or assist you in getting to the cath holding area. [facility] [place], [id number] [place] [address])     You are not to eat or drink anything after midnight the night before your procedure. Small sips of water to take your medications is ok. If you are diabetic, do not take your insulin/sugar pill the morning of the procedure. MEDICATION INSTRUCTIONS:   Please take your morning medications with the following special instructions:           [x]          Please make sure to take your Blood pressure medication           [x]          Take your Plavix          [x]          Hold (DO NOT TAKE) your Metformin  on  08/22/23 and do not resume it until after you have the blood work done on  08/26/23 and have called office to verify if ok to resume. We encourage families to wait in the waiting room on the first floor while the procedure is being done. The Doctor will come out and talk with you as soon as the procedure is over. You will need someone to drive you home after you have been discharged from the hospital.     There is the possibility that you may spend the night in the hospital, depending on the results of the procedure. This will be determined after the procedure is done. If angioplasty or stent is planned, you will stay at least one day. If you or your family have any questions, please call our office Monday -Friday, 8:30 a.m.-4:30 p.m.,  at [phone], and ask to speak to one of the nurses.   Please have labs done 1 week

## 2023-08-16 ENCOUNTER — OFFICE VISIT (OUTPATIENT)
Age: 73
End: 2023-08-16
Payer: MEDICARE

## 2023-08-16 ENCOUNTER — TELEPHONE (OUTPATIENT)
Age: 73
End: 2023-08-16

## 2023-08-16 VITALS
SYSTOLIC BLOOD PRESSURE: 136 MMHG | BODY MASS INDEX: 29.84 KG/M2 | HEART RATE: 64 BPM | DIASTOLIC BLOOD PRESSURE: 70 MMHG | OXYGEN SATURATION: 97 % | HEIGHT: 63 IN | TEMPERATURE: 97.4 F | WEIGHT: 168.4 LBS | RESPIRATION RATE: 16 BRPM

## 2023-08-16 DIAGNOSIS — R94.39 ABNORMAL STRESS TEST: Primary | ICD-10-CM

## 2023-08-16 DIAGNOSIS — E78.5 DYSLIPIDEMIA: ICD-10-CM

## 2023-08-16 DIAGNOSIS — E11.21 TYPE 2 DIABETES WITH NEPHROPATHY (HCC): ICD-10-CM

## 2023-08-16 DIAGNOSIS — I10 ESSENTIAL HYPERTENSION: ICD-10-CM

## 2023-08-16 DIAGNOSIS — N20.0 NEPHROLITHIASIS: ICD-10-CM

## 2023-08-16 DIAGNOSIS — G45.9 TIA (TRANSIENT ISCHEMIC ATTACK): ICD-10-CM

## 2023-08-16 PROCEDURE — 3046F HEMOGLOBIN A1C LEVEL >9.0%: CPT | Performed by: INTERNAL MEDICINE

## 2023-08-16 PROCEDURE — 99214 OFFICE O/P EST MOD 30 MIN: CPT | Performed by: INTERNAL MEDICINE

## 2023-08-16 PROCEDURE — 3017F COLORECTAL CA SCREEN DOC REV: CPT | Performed by: INTERNAL MEDICINE

## 2023-08-16 PROCEDURE — G8399 PT W/DXA RESULTS DOCUMENT: HCPCS | Performed by: INTERNAL MEDICINE

## 2023-08-16 PROCEDURE — G8428 CUR MEDS NOT DOCUMENT: HCPCS | Performed by: INTERNAL MEDICINE

## 2023-08-16 PROCEDURE — 3075F SYST BP GE 130 - 139MM HG: CPT | Performed by: INTERNAL MEDICINE

## 2023-08-16 PROCEDURE — 3078F DIAST BP <80 MM HG: CPT | Performed by: INTERNAL MEDICINE

## 2023-08-16 PROCEDURE — G8417 CALC BMI ABV UP PARAM F/U: HCPCS | Performed by: INTERNAL MEDICINE

## 2023-08-16 PROCEDURE — 1090F PRES/ABSN URINE INCON ASSESS: CPT | Performed by: INTERNAL MEDICINE

## 2023-08-16 PROCEDURE — 99211 OFF/OP EST MAY X REQ PHY/QHP: CPT

## 2023-08-16 PROCEDURE — 1123F ACP DISCUSS/DSCN MKR DOCD: CPT | Performed by: INTERNAL MEDICINE

## 2023-08-16 PROCEDURE — 1036F TOBACCO NON-USER: CPT | Performed by: INTERNAL MEDICINE

## 2023-08-16 PROCEDURE — 2022F DILAT RTA XM EVC RTNOPTHY: CPT | Performed by: INTERNAL MEDICINE

## 2023-08-16 ASSESSMENT — PATIENT HEALTH QUESTIONNAIRE - PHQ9
SUM OF ALL RESPONSES TO PHQ QUESTIONS 1-9: 0
SUM OF ALL RESPONSES TO PHQ QUESTIONS 1-9: 0
SUM OF ALL RESPONSES TO PHQ9 QUESTIONS 1 & 2: 0
SUM OF ALL RESPONSES TO PHQ QUESTIONS 1-9: 0
SUM OF ALL RESPONSES TO PHQ QUESTIONS 1-9: 0
2. FEELING DOWN, DEPRESSED OR HOPELESS: 0
1. LITTLE INTEREST OR PLEASURE IN DOING THINGS: 0

## 2023-08-16 NOTE — TELEPHONE ENCOUNTER
Pt just checked out and unaware the time frame she needs to be seen for follow up. She has labs 10/12/23 and a 6 month f/u 10/19/23. Pt is not sure if she needs to keep these appts. Can you please let me know if she does not we can cancel them and the time frame she needs to come back so we can schedule.      Please advise

## 2023-08-16 NOTE — PROGRESS NOTES
Abelardo Sosa presents today for   Chief Complaint   Patient presents with    Hypertension     3 month follow up                  1. \"Have you been to the ER, urgent care clinic since your last visit? Hospitalized since your last visit? \" yes    2. \"Have you seen or consulted any other health care providers outside of the [address] [place] [address] since your last visit? \" no     3. For patients aged 43-73: Has the patient had a colonoscopy / FIT/ Cologuard? Yes - no Care Gap present      If the patient is female:    4. For patients aged 43-66: Has the patient had a mammogram within the past 2 years? Yes - no Care Gap present      5. For patients aged 21-65: Has the patient had a pap smear?  NA - based on age or sex
Dyslipidemia    Advance directive discussed with patient    Nephrolithiasis    Hypovitaminosis D    Essential hypertension    Overweight with body mass index (BMI) of 25 to 25.9 in adult    Symptomatic carotid artery stenosis    TIA (transient ischemic attack)    GERD without esophagitis    Subclavian artery stenosis, left (HCC)         Assessment and plan:  1. Diabetes. F/u w Dr Grey Samuel and Dr Iona Wood. Discussed glp vs insulin pump, endo will make the call but need to get a1c in the 6 range if possible. Also, now with microalbumin and we discussed the meaning of that at length  2. Dyslipidemia. At target on lipitor   3. Hypertension. Continue current   4. GERD. PPI and avoidance measures  5. Hypovit d. Supplement   6. TIA. Continue current regimen. 7.  Vascular. F/U vascular   8. Nephrolithiasis. Plans per [facility]  9. Abn stress test.  For cath later this month      RTC 1/24    Above conditions discussed at length and patient vocalized understanding. All questions answered to patient satisfaction       Diagnosis Orders   1. Abnormal stress test        2. Essential hypertension        3. TIA (transient ischemic attack)        4. Type 2 diabetes with nephropathy (HCC)  Comprehensive Metabolic Panel    HEMOGLOBIN A1C W/O EAG    Microalbumin / Creatinine Urine Ratio    Lipid Panel      5. Nephrolithiasis        6.  Dyslipidemia

## 2023-08-17 ENCOUNTER — HOSPITAL ENCOUNTER (OUTPATIENT)
Facility: HOSPITAL | Age: 73
Discharge: HOME | End: 2023-08-17
Payer: MEDICARE

## 2023-08-17 ENCOUNTER — HOSPITAL ENCOUNTER (OUTPATIENT)
Facility: HOSPITAL | Age: 73
End: 2023-08-17
Payer: MEDICARE

## 2023-08-17 DIAGNOSIS — Z01.818 PRE-OP TESTING: ICD-10-CM

## 2023-08-17 LAB
ALBUMIN SERPL-MCNC: 3.5 G/DL (ref 3.4–5)
ALBUMIN/GLOB SERPL: 1 (ref 0.8–1.7)
ALP SERPL-CCNC: 127 U/L (ref 45–117)
ALT SERPL-CCNC: 20 U/L (ref 13–56)
ANION GAP SERPL CALC-SCNC: 6 MMOL/L (ref 3–18)
AST SERPL-CCNC: 14 U/L (ref 10–38)
BILIRUB SERPL-MCNC: 0.9 MG/DL (ref 0.2–1)
BUN SERPL-MCNC: 14 MG/DL (ref 7–18)
BUN/CREAT SERPL: 12 (ref 12–20)
CALCIUM SERPL-MCNC: 9.1 MG/DL (ref 8.5–10.1)
CHLORIDE SERPL-SCNC: 103 MMOL/L (ref 100–111)
CO2 SERPL-SCNC: 28 MMOL/L (ref 21–32)
CREAT SERPL-MCNC: 1.19 MG/DL (ref 0.6–1.3)
ERYTHROCYTE [DISTWIDTH] IN BLOOD BY AUTOMATED COUNT: 14.2 % (ref 11.6–14.5)
GLOBULIN SER CALC-MCNC: 3.6 G/DL (ref 2–4)
GLUCOSE SERPL-MCNC: 209 MG/DL (ref 74–99)
HCT VFR BLD AUTO: 39.5 % (ref 35–45)
HGB BLD-MCNC: 12.1 G/DL (ref 12–16)
INR PPP: 1 (ref 0.9–1.1)
MCH RBC QN AUTO: 25.7 PG (ref 24–34)
MCHC RBC AUTO-ENTMCNC: 30.6 G/DL (ref 31–37)
MCV RBC AUTO: 83.9 FL (ref 78–100)
NRBC # BLD: 0 K/UL (ref 0–0.01)
NRBC BLD-RTO: 0 PER 100 WBC
PLATELET # BLD AUTO: 317 K/UL (ref 135–420)
PMV BLD AUTO: 12.1 FL (ref 9.2–11.8)
POTASSIUM SERPL-SCNC: 4.5 MMOL/L (ref 3.5–5.5)
PROT SERPL-MCNC: 7.1 G/DL (ref 6.4–8.2)
PROTHROMBIN TIME: 13.2 SEC (ref 11.9–14.7)
RBC # BLD AUTO: 4.71 M/UL (ref 4.2–5.3)
SODIUM SERPL-SCNC: 137 MMOL/L (ref 136–145)
WBC # BLD AUTO: 6.6 K/UL (ref 4.6–13.2)

## 2023-08-17 PROCEDURE — 85610 PROTHROMBIN TIME: CPT

## 2023-08-17 PROCEDURE — 36415 COLL VENOUS BLD VENIPUNCTURE: CPT

## 2023-08-17 PROCEDURE — 85027 COMPLETE CBC AUTOMATED: CPT

## 2023-08-17 PROCEDURE — 80053 COMPREHEN METABOLIC PANEL: CPT

## 2023-08-17 PROCEDURE — 71046 X-RAY EXAM CHEST 2 VIEWS: CPT

## 2023-08-24 ENCOUNTER — HOSPITAL ENCOUNTER (OUTPATIENT)
Facility: HOSPITAL | Age: 73
Setting detail: OUTPATIENT SURGERY
Discharge: HOME | End: 2023-08-24
Attending: INTERNAL MEDICINE | Admitting: INTERNAL MEDICINE
Payer: MEDICARE

## 2023-08-24 VITALS
SYSTOLIC BLOOD PRESSURE: 151 MMHG | BODY MASS INDEX: 29.76 KG/M2 | DIASTOLIC BLOOD PRESSURE: 67 MMHG | WEIGHT: 168 LBS | RESPIRATION RATE: 24 BRPM | OXYGEN SATURATION: 99 % | HEART RATE: 64 BPM

## 2023-08-24 DIAGNOSIS — R93.1 ABNORMAL NUCLEAR CARDIAC IMAGING TEST: ICD-10-CM

## 2023-08-24 PROCEDURE — C1769 GUIDE WIRE: HCPCS | Performed by: INTERNAL MEDICINE

## 2023-08-24 PROCEDURE — C1725 CATH, TRANSLUMIN NON-LASER: HCPCS | Performed by: INTERNAL MEDICINE

## 2023-08-24 PROCEDURE — C1713 ANCHOR/SCREW BN/BN,TIS/BN: HCPCS | Performed by: INTERNAL MEDICINE

## 2023-08-24 PROCEDURE — 6360000004 HC RX CONTRAST MEDICATION: Performed by: INTERNAL MEDICINE

## 2023-08-24 PROCEDURE — 2580000003 HC RX 258: Performed by: INTERNAL MEDICINE

## 2023-08-24 PROCEDURE — C1894 INTRO/SHEATH, NON-LASER: HCPCS | Performed by: INTERNAL MEDICINE

## 2023-08-24 PROCEDURE — C9600 PERC DRUG-EL COR STENT SING: HCPCS | Performed by: INTERNAL MEDICINE

## 2023-08-24 PROCEDURE — 93452 LEFT HRT CATH W/VENTRCLGRPHY: CPT | Performed by: INTERNAL MEDICINE

## 2023-08-24 PROCEDURE — 2500000003 HC RX 250 WO HCPCS: Performed by: INTERNAL MEDICINE

## 2023-08-24 PROCEDURE — 6360000002 HC RX W HCPCS: Performed by: INTERNAL MEDICINE

## 2023-08-24 PROCEDURE — 2709999900 HC NON-CHARGEABLE SUPPLY: Performed by: INTERNAL MEDICINE

## 2023-08-24 PROCEDURE — 6370000000 HC RX 637 (ALT 250 FOR IP): Performed by: INTERNAL MEDICINE

## 2023-08-24 PROCEDURE — C1887 CATHETER, GUIDING: HCPCS | Performed by: INTERNAL MEDICINE

## 2023-08-24 PROCEDURE — 93458 L HRT ARTERY/VENTRICLE ANGIO: CPT | Performed by: INTERNAL MEDICINE

## 2023-08-24 PROCEDURE — 99152 MOD SED SAME PHYS/QHP 5/>YRS: CPT | Performed by: INTERNAL MEDICINE

## 2023-08-24 PROCEDURE — 99153 MOD SED SAME PHYS/QHP EA: CPT | Performed by: INTERNAL MEDICINE

## 2023-08-24 PROCEDURE — C1874 STENT, COATED/COV W/DEL SYS: HCPCS | Performed by: INTERNAL MEDICINE

## 2023-08-24 PROCEDURE — 92943 PRQ TRLUML REVSC CH OCC ANT: CPT | Performed by: INTERNAL MEDICINE

## 2023-08-24 PROCEDURE — 76000 FLUOROSCOPY <1 HR PHYS/QHP: CPT | Performed by: INTERNAL MEDICINE

## 2023-08-24 DEVICE — STENT ONYXNG20018UX ONYX 2.00X18RX
Type: IMPLANTABLE DEVICE | Status: FUNCTIONAL
Brand: ONYX FRONTIER™

## 2023-08-24 DEVICE — STENT ONYXNG20015UX ONYX 2.00X15RX
Type: IMPLANTABLE DEVICE | Status: FUNCTIONAL
Brand: ONYX FRONTIER™

## 2023-08-24 RX ORDER — ASPIRIN 81 MG/1
81 TABLET, CHEWABLE ORAL DAILY
COMMUNITY

## 2023-08-24 RX ORDER — ASPIRIN 81 MG/1
81 TABLET, CHEWABLE ORAL
Status: COMPLETED | OUTPATIENT
Start: 2023-08-24 | End: 2023-08-24

## 2023-08-24 RX ORDER — HYDRALAZINE HYDROCHLORIDE 20 MG/ML
INJECTION INTRAMUSCULAR; INTRAVENOUS PRN
Status: DISCONTINUED | OUTPATIENT
Start: 2023-08-24 | End: 2023-08-24 | Stop reason: HOSPADM

## 2023-08-24 RX ORDER — FENTANYL CITRATE 50 UG/ML
INJECTION, SOLUTION INTRAMUSCULAR; INTRAVENOUS PRN
Status: DISCONTINUED | OUTPATIENT
Start: 2023-08-24 | End: 2023-08-24 | Stop reason: HOSPADM

## 2023-08-24 RX ORDER — MIDAZOLAM HYDROCHLORIDE 1 MG/ML
INJECTION INTRAMUSCULAR; INTRAVENOUS PRN
Status: DISCONTINUED | OUTPATIENT
Start: 2023-08-24 | End: 2023-08-24 | Stop reason: HOSPADM

## 2023-08-24 RX ORDER — VERAPAMIL HYDROCHLORIDE 2.5 MG/ML
INJECTION, SOLUTION INTRAVENOUS PRN
Status: DISCONTINUED | OUTPATIENT
Start: 2023-08-24 | End: 2023-08-24 | Stop reason: HOSPADM

## 2023-08-24 RX ORDER — ASPIRIN 81 MG/1
81 TABLET, CHEWABLE ORAL DAILY
Qty: 30 TABLET | Refills: 3 | Status: SHIPPED | OUTPATIENT
Start: 2023-08-24

## 2023-08-24 RX ORDER — HEPARIN SODIUM 200 [USP'U]/100ML
INJECTION, SOLUTION INTRAVENOUS CONTINUOUS PRN
Status: COMPLETED | OUTPATIENT
Start: 2023-08-24 | End: 2023-08-24

## 2023-08-24 RX ORDER — BIVALIRUDIN 250 MG/5ML
INJECTION, POWDER, LYOPHILIZED, FOR SOLUTION INTRAVENOUS PRN
Status: DISCONTINUED | OUTPATIENT
Start: 2023-08-24 | End: 2023-08-24 | Stop reason: HOSPADM

## 2023-08-24 RX ORDER — NITROGLYCERIN 20 MG/100ML
INJECTION INTRAVENOUS PRN
Status: DISCONTINUED | OUTPATIENT
Start: 2023-08-24 | End: 2023-08-24 | Stop reason: HOSPADM

## 2023-08-24 RX ORDER — CLOPIDOGREL 300 MG/1
TABLET, FILM COATED ORAL PRN
Status: DISCONTINUED | OUTPATIENT
Start: 2023-08-24 | End: 2023-08-24 | Stop reason: HOSPADM

## 2023-08-24 RX ADMIN — ASPIRIN 81 MG CHEWABLE TABLET 81 MG: 81 TABLET CHEWABLE at 09:36

## 2023-08-24 NOTE — H&P
gr 1 dd, nl valves (8/18)    History of Holter monitoring 01/2018     few pac/pvc, 4 beat run svt rate 156 (1/18)    Hyperlipidemia      Hypertension      Nephrolithiasis 06/2016     Dr Tony Ho ureteral stone s/p EWSL (6/16); RIGHT ureteral stone s/p JJ stent (5/23)    Obesity       W - 12/19    Sepsis ([address]) 05/2023     e coli; right ureteral stone    TIA (transient ischemic attack) 04/2018    Vitamin D deficiency              PSH:  Past Surgical History         Past Surgical History:   Procedure Laterality Date    ANKLE FRACTURE SURGERY Left 01/2022     Dr Briseyda Davis   2009     negative    COLONOSCOPY         Dr Ludivina Washington 12/13 neg    CYSTOSCOPY Right 5/7/2023     CYSTOSCOPY, RIGHT URETERAL STENT INSERTION, RIGHT RETROGRADE PYELOGRAM performed by Shanti Harvey MD at [facility]    LITHOTRIPSY Right 06/10/2016     Dr. Giovani Min   11/2017     mri showed mod periventricular wm changes    ORTHOPEDIC SURGERY         DEXA t score 1.10 spine, 1.10 hip (11/13)    VASCULAR SURGERY   10/2019     CTA showed 54% GAMALIEL, 52% prox LICA, mild RSCA sten, mod LSCA stenosis; Dr Edilma Smiley   04/2018     MRA right vert artery not visualized due to occlusion/stenosis, left patent, prox GAMALIEL 50-60% stenosis    VASCULAR SURGERY   11/2017     BICA<50%, RIGHT vertebral art obst    VASCULAR SURGERY   04/2018     RABI 1.0/LABI  1.1    VASCULAR SURGERY   12/2017     CTA head/neck showed no aneurysm, <50% right intracranial ICA, diminutive right vertebral art            MEDS:  Current Facility-Administered Medications          Current Outpatient Medications   Medication Sig Dispense Refill    fluticasone (FLONASE) 50 MCG/ACT nasal spray 2 sprays by Each Nostril route daily as needed        DROPLET PEN NEEDLES 32G X 4 MM MISC use 1 PEN NEEDLE to inject MEDICATION subcutaneously four times a day        clopidogrel (PLAVIX) 75 MG tablet take 1 tablet by mouth once daily 90 tablet 3    metFORMIN (GLUCOPHAGE) 1000 MG tablet Take 1 tablet by mouth daily        HUMALOG KWIKPEN 100 UNIT/ML SOPN Inject into the skin SLIDING SCALE        atorvastatin (LIPITOR) 40 MG tablet take 1 tablet by mouth once daily 90 tablet 2    LUMIGAN 0.01 % SOLN ophthalmic drops Place 1 drop into both eyes nightly        insulin glargine (LANTUS SOLOSTAR) 100 UNIT/ML injection pen Inject 12 Units into the skin nightly (Patient taking differently: Inject 40 Units into the skin nightly) 5 Adjustable Dose Pre-filled Pen Syringe 3    amLODIPine (NORVASC) 10 MG tablet Take 1 tablet by mouth daily        carvedilol (COREG) 25 MG tablet Take 1 tablet by mouth 2 times daily (with meals)        vitamin D3 (CHOLECALCIFEROL) 125 MCG (5000 UT) TABS tablet Take 1 tablet by mouth daily        Exenatide ER (BYDUREON BCISE) 2 MG/0.85ML injection inject 2 milligrams subcutaneously in ABDOMEN,THIGH,OR OUTER AREA. ..  (REFER TO PRESCRIPTION NOTES). (Patient not taking: Reported on 8/14/2023)          No current facility-administered medications for this visit. Allergies and Sensitivities:        Allergies   Allergen Reactions    Penicillins Nausea And Vomiting and Shortness Of Breath    Gemfibrozil Other (See Comments)       elev lfts         Family History:  Family History         Family History   Problem Relation Age of Onset    Hypertension Sister      Cancer Sister      Hypertension Father      Diabetes Father              Social History:  She  reports that she has never smoked. She has never used smokeless tobacco.  She  reports that she does not currently use alcohol after a past usage of about 2.0 standard drinks per week. Review of Systems   Constitutional:  Negative for activity change, appetite change, chills, fatigue, fever and unexpected weight change. Respiratory:  Negative for cough, chest tightness and wheezing. Shortness of breath: with exertion only.    Cardiovascular:  Positive for leg swelling

## 2023-08-24 NOTE — DISCHARGE INSTRUCTIONS
Cardiac Catheterization Discharge Instructions    Site Care  Check puncture site frequently for swelling or bleeding. If there is any bleeding, lie down (if access is groin), hold firm pressure with a clean towel or wash cloth. If bleeding doesn't stop, call 911. Notify your doctor for any redness, swelling, drainage, or oozing from the puncture site. If extremity becomes cold, numb or painful go to the emergency room. You may remove the bandage from your Right, Arm in 24 hours. You may shower at that point. No hot tubs, bath tubs, or swimming for 1 week. After shower, pat the incision dry and you can place a clean band-aid over the site. No lotions, ointments, or powders over puncture site for 1 week. Use a clean band-aid over the puncture site each day for 5 days. Change band-aid daily. Cold pack or ice can be placed on the area for 10 to 20 minutes to help with the soreness of the site. Activity  Activity should be limited for the next 48 hours. Climb as little stairs as possible and avoiding any bending, stooping, or strenuous activity for 48 hours. No heavy lifting (anything heavier than a gallon of milk) for 5 days. You can walk around the house and do light activity such as cooking. If procedure was done through the groin, avoid stairs for the first day or two  If the procedure was done through the wrist, do not bend your wrist deeply for the first couple of days. Be careful when getting up from sitting as to not use the affected wrist for the first 48 hours. Diet  You may resume your usual diet. Drink more fluids than usual to flush kidneys. If you have kidney, heart or liver disease talk with your doctor on fluid limitations  Keep eating a heart healthy diet full of fruits, vegetables, and whole grains. Medication  Your doctor will tell you when to resume your medications.   If you take blood thinners such as warfarin (Coumadin), rivaroxaban (Xarelto), or apixaban (Eliquis) talk

## 2023-08-28 ENCOUNTER — HOSPITAL ENCOUNTER (OUTPATIENT)
Facility: HOSPITAL | Age: 73
Discharge: HOME | End: 2023-08-31
Payer: MEDICARE

## 2023-08-28 DIAGNOSIS — R93.1 ABNORMAL NUCLEAR CARDIAC IMAGING TEST: ICD-10-CM

## 2023-08-28 DIAGNOSIS — Z01.818 PRE-OP TESTING: ICD-10-CM

## 2023-08-28 LAB
ANION GAP SERPL CALC-SCNC: 5 MMOL/L (ref 3–18)
BUN SERPL-MCNC: 17 MG/DL (ref 7–18)
BUN/CREAT SERPL: 15 (ref 12–20)
CALCIUM SERPL-MCNC: 8.6 MG/DL (ref 8.5–10.1)
CHLORIDE SERPL-SCNC: 108 MMOL/L (ref 100–111)
CO2 SERPL-SCNC: 26 MMOL/L (ref 21–32)
CREAT SERPL-MCNC: 1.16 MG/DL (ref 0.6–1.3)
GLUCOSE SERPL-MCNC: 240 MG/DL (ref 74–99)
POTASSIUM SERPL-SCNC: 4.1 MMOL/L (ref 3.5–5.5)
SODIUM SERPL-SCNC: 139 MMOL/L (ref 136–145)

## 2023-08-28 PROCEDURE — 36415 COLL VENOUS BLD VENIPUNCTURE: CPT

## 2023-08-28 PROCEDURE — 80048 BASIC METABOLIC PNL TOTAL CA: CPT

## 2023-08-29 ENCOUNTER — PREP FOR PROCEDURE (OUTPATIENT)
Age: 73
End: 2023-08-29

## 2023-08-29 ENCOUNTER — TELEPHONE (OUTPATIENT)
Age: 73
End: 2023-08-29

## 2023-08-29 DIAGNOSIS — I25.10 CORONARY ARTERY DISEASE, UNSPECIFIED VESSEL OR LESION TYPE, UNSPECIFIED WHETHER ANGINA PRESENT, UNSPECIFIED WHETHER NATIVE OR TRANSPLANTED HEART: Primary | ICD-10-CM

## 2023-08-29 NOTE — TELEPHONE ENCOUNTER
----- Message from Colby Harvey sent at 8/29/2023  9:14 AM EDT -----  Regarding: instructions  Patient is scheduled for Staged PCI on 9/14 @ 9:15 with Dr. Lupe Samuel. Please call her with instructions.   Thanks

## 2023-08-29 NOTE — TELEPHONE ENCOUNTER
Catheterization Instructions       You are scheduled to have a Left Heart Catheterization  on September 14, 2023  at [address] am.    Please check in at [address] am .      Please go to [facility] and shazia in the outpatient parking lot that is located around to the back of the hospital and enter through the [facility] building. Once you enter through the [facility] check in with the  there. The  will either give you directions or assist you in getting to the cath holding area. DR[facility] [place], [id number] [facility])     You are not to eat or drink anything after midnight the night before your procedure. Small sips of water to take your medications is ok. If you are diabetic, do not take your insulin/sugar pill the morning of the procedure. MEDICATION INSTRUCTIONS:   Please take your morning medications with the following special instructions:           [x]          Please make sure to take your Blood pressure medication : Coreg, and Norvasc. [x]          Take your Aspirin,Plavix. We encourage families to wait in the waiting room on the first floor while the procedure is being done. The Doctor will come out and talk with you as soon as the procedure is over. You will need someone to drive you home after you have been discharged from the hospital.     There is the possibility that you may spend the night in the hospital, depending on the results of the procedure. This will be determined after the procedure is done. If angioplasty or stent is planned, you will stay at least one day. If you or your family have any questions, please call our office Monday -Friday, 8:30 a.m.-4:30 p.m.,  at [phone], and ask to speak to one of the nurses.

## 2023-08-30 RX ORDER — SODIUM CHLORIDE 0.9 % (FLUSH) 0.9 %
5-40 SYRINGE (ML) INJECTION PRN
Status: CANCELLED | OUTPATIENT
Start: 2023-08-30

## 2023-08-30 RX ORDER — SODIUM CHLORIDE 0.9 % (FLUSH) 0.9 %
5-40 SYRINGE (ML) INJECTION EVERY 12 HOURS SCHEDULED
Status: CANCELLED | OUTPATIENT
Start: 2023-08-30

## 2023-08-30 RX ORDER — SODIUM CHLORIDE 9 MG/ML
INJECTION, SOLUTION INTRAVENOUS PRN
Status: CANCELLED | OUTPATIENT
Start: 2023-08-30

## 2023-09-14 ENCOUNTER — HOSPITAL ENCOUNTER (OUTPATIENT)
Facility: HOSPITAL | Age: 73
Setting detail: OUTPATIENT SURGERY
Discharge: HOME | End: 2023-09-14
Attending: INTERNAL MEDICINE | Admitting: INTERNAL MEDICINE
Payer: MEDICARE

## 2023-09-14 VITALS
RESPIRATION RATE: 13 BRPM | WEIGHT: 168 LBS | BODY MASS INDEX: 29.77 KG/M2 | OXYGEN SATURATION: 98 % | SYSTOLIC BLOOD PRESSURE: 148 MMHG | DIASTOLIC BLOOD PRESSURE: 83 MMHG | HEIGHT: 63 IN | HEART RATE: 54 BPM

## 2023-09-14 DIAGNOSIS — I25.10 CORONARY ARTERY DISEASE, UNSPECIFIED VESSEL OR LESION TYPE, UNSPECIFIED WHETHER ANGINA PRESENT, UNSPECIFIED WHETHER NATIVE OR TRANSPLANTED HEART: ICD-10-CM

## 2023-09-14 LAB
ANION GAP SERPL CALC-SCNC: 5 MMOL/L (ref 3–18)
BASOPHILS # BLD: 0 K/UL (ref 0–0.1)
BASOPHILS NFR BLD: 0 % (ref 0–2)
BUN SERPL-MCNC: 18 MG/DL (ref 7–18)
BUN/CREAT SERPL: 14 (ref 12–20)
CALCIUM SERPL-MCNC: 8.7 MG/DL (ref 8.5–10.1)
CHLORIDE SERPL-SCNC: 105 MMOL/L (ref 100–111)
CO2 SERPL-SCNC: 28 MMOL/L (ref 21–32)
CREAT SERPL-MCNC: 1.32 MG/DL (ref 0.6–1.3)
DIFFERENTIAL METHOD BLD: ABNORMAL
ECHO BSA: 1.84 M2
EOSINOPHIL # BLD: 0.2 K/UL (ref 0–0.4)
EOSINOPHIL NFR BLD: 3 % (ref 0–5)
ERYTHROCYTE [DISTWIDTH] IN BLOOD BY AUTOMATED COUNT: 13.5 % (ref 11.6–14.5)
GLUCOSE SERPL-MCNC: 221 MG/DL (ref 74–99)
HCT VFR BLD AUTO: 42.1 % (ref 35–45)
HGB BLD-MCNC: 13.3 G/DL (ref 12–16)
IMM GRANULOCYTES # BLD AUTO: 0 K/UL (ref 0–0.04)
IMM GRANULOCYTES NFR BLD AUTO: 0 % (ref 0–0.5)
LYMPHOCYTES # BLD: 1.1 K/UL (ref 0.9–3.6)
LYMPHOCYTES NFR BLD: 16 % (ref 21–52)
MCH RBC QN AUTO: 25.9 PG (ref 24–34)
MCHC RBC AUTO-ENTMCNC: 31.6 G/DL (ref 31–37)
MCV RBC AUTO: 81.9 FL (ref 78–100)
MONOCYTES # BLD: 0.4 K/UL (ref 0.05–1.2)
MONOCYTES NFR BLD: 6 % (ref 3–10)
NEUTS SEG # BLD: 5.1 K/UL (ref 1.8–8)
NEUTS SEG NFR BLD: 75 % (ref 40–73)
NRBC # BLD: 0 K/UL (ref 0–0.01)
NRBC BLD-RTO: 0 PER 100 WBC
PLATELET # BLD AUTO: 276 K/UL (ref 135–420)
PMV BLD AUTO: 11.7 FL (ref 9.2–11.8)
POTASSIUM SERPL-SCNC: 4.1 MMOL/L (ref 3.5–5.5)
RBC # BLD AUTO: 5.14 M/UL (ref 4.2–5.3)
SODIUM SERPL-SCNC: 138 MMOL/L (ref 136–145)
WBC # BLD AUTO: 6.9 K/UL (ref 4.6–13.2)

## 2023-09-14 PROCEDURE — 2709999900 HC NON-CHARGEABLE SUPPLY: Performed by: INTERNAL MEDICINE

## 2023-09-14 PROCEDURE — C9600 PERC DRUG-EL COR STENT SING: HCPCS | Performed by: INTERNAL MEDICINE

## 2023-09-14 PROCEDURE — 76000 FLUOROSCOPY <1 HR PHYS/QHP: CPT | Performed by: INTERNAL MEDICINE

## 2023-09-14 PROCEDURE — 6370000000 HC RX 637 (ALT 250 FOR IP): Performed by: INTERNAL MEDICINE

## 2023-09-14 PROCEDURE — C1713 ANCHOR/SCREW BN/BN,TIS/BN: HCPCS | Performed by: INTERNAL MEDICINE

## 2023-09-14 PROCEDURE — 93458 L HRT ARTERY/VENTRICLE ANGIO: CPT | Performed by: INTERNAL MEDICINE

## 2023-09-14 PROCEDURE — 6360000002 HC RX W HCPCS: Performed by: INTERNAL MEDICINE

## 2023-09-14 PROCEDURE — C1874 STENT, COATED/COV W/DEL SYS: HCPCS | Performed by: INTERNAL MEDICINE

## 2023-09-14 PROCEDURE — 2500000003 HC RX 250 WO HCPCS: Performed by: INTERNAL MEDICINE

## 2023-09-14 PROCEDURE — C1725 CATH, TRANSLUMIN NON-LASER: HCPCS | Performed by: INTERNAL MEDICINE

## 2023-09-14 PROCEDURE — 99152 MOD SED SAME PHYS/QHP 5/>YRS: CPT | Performed by: INTERNAL MEDICINE

## 2023-09-14 PROCEDURE — C1769 GUIDE WIRE: HCPCS | Performed by: INTERNAL MEDICINE

## 2023-09-14 PROCEDURE — 92929 PR PRQ TRLUML CORONARY STENT W/ANGIO ADDL ART/BRNCH: CPT | Performed by: INTERNAL MEDICINE

## 2023-09-14 PROCEDURE — 92921 HC PRQ CARDIAC ANGIO ADDL ART: CPT | Performed by: INTERNAL MEDICINE

## 2023-09-14 PROCEDURE — 85025 COMPLETE CBC W/AUTO DIFF WBC: CPT

## 2023-09-14 PROCEDURE — 93452 LEFT HRT CATH W/VENTRCLGRPHY: CPT | Performed by: INTERNAL MEDICINE

## 2023-09-14 PROCEDURE — 80048 BASIC METABOLIC PNL TOTAL CA: CPT

## 2023-09-14 PROCEDURE — C1894 INTRO/SHEATH, NON-LASER: HCPCS | Performed by: INTERNAL MEDICINE

## 2023-09-14 PROCEDURE — C1887 CATHETER, GUIDING: HCPCS | Performed by: INTERNAL MEDICINE

## 2023-09-14 PROCEDURE — C9601 PERC DRUG-EL COR STENT BRAN: HCPCS | Performed by: INTERNAL MEDICINE

## 2023-09-14 PROCEDURE — 6360000004 HC RX CONTRAST MEDICATION: Performed by: INTERNAL MEDICINE

## 2023-09-14 PROCEDURE — 92920 PRQ TRLUML C ANGIOP 1ART&/BR: CPT | Performed by: INTERNAL MEDICINE

## 2023-09-14 PROCEDURE — 92928 PRQ TCAT PLMT NTRAC ST 1 LES: CPT | Performed by: INTERNAL MEDICINE

## 2023-09-14 PROCEDURE — 99153 MOD SED SAME PHYS/QHP EA: CPT | Performed by: INTERNAL MEDICINE

## 2023-09-14 DEVICE — STENT ONYXNG20026UX ONYX 2.00X26RX
Type: IMPLANTABLE DEVICE | Status: FUNCTIONAL
Brand: ONYX FRONTIER™

## 2023-09-14 DEVICE — STENT ONYXNG25018UX ONYX 2.50X18RX
Type: IMPLANTABLE DEVICE | Status: FUNCTIONAL
Brand: ONYX FRONTIER™

## 2023-09-14 RX ORDER — CLOPIDOGREL BISULFATE 75 MG/1
TABLET ORAL PRN
Status: DISCONTINUED | OUTPATIENT
Start: 2023-09-14 | End: 2023-09-14 | Stop reason: HOSPADM

## 2023-09-14 RX ORDER — FENTANYL CITRATE 50 UG/ML
INJECTION, SOLUTION INTRAMUSCULAR; INTRAVENOUS PRN
Status: DISCONTINUED | OUTPATIENT
Start: 2023-09-14 | End: 2023-09-14 | Stop reason: HOSPADM

## 2023-09-14 RX ORDER — VERAPAMIL HYDROCHLORIDE 2.5 MG/ML
INJECTION, SOLUTION INTRAVENOUS PRN
Status: DISCONTINUED | OUTPATIENT
Start: 2023-09-14 | End: 2023-09-14 | Stop reason: HOSPADM

## 2023-09-14 RX ORDER — BIVALIRUDIN 250 MG/5ML
INJECTION, POWDER, LYOPHILIZED, FOR SOLUTION INTRAVENOUS PRN
Status: DISCONTINUED | OUTPATIENT
Start: 2023-09-14 | End: 2023-09-14 | Stop reason: HOSPADM

## 2023-09-14 RX ORDER — MIDAZOLAM HYDROCHLORIDE 1 MG/ML
INJECTION INTRAMUSCULAR; INTRAVENOUS PRN
Status: DISCONTINUED | OUTPATIENT
Start: 2023-09-14 | End: 2023-09-14 | Stop reason: HOSPADM

## 2023-09-14 RX ORDER — SODIUM CHLORIDE 0.9 % (FLUSH) 0.9 %
5-40 SYRINGE (ML) INJECTION PRN
Status: DISCONTINUED | OUTPATIENT
Start: 2023-09-14 | End: 2023-09-14 | Stop reason: HOSPADM

## 2023-09-14 RX ORDER — HEPARIN SODIUM 200 [USP'U]/100ML
INJECTION, SOLUTION INTRAVENOUS CONTINUOUS PRN
Status: COMPLETED | OUTPATIENT
Start: 2023-09-14 | End: 2023-09-14

## 2023-09-14 RX ORDER — SODIUM CHLORIDE 9 MG/ML
INJECTION, SOLUTION INTRAVENOUS PRN
Status: DISCONTINUED | OUTPATIENT
Start: 2023-09-14 | End: 2023-09-14 | Stop reason: HOSPADM

## 2023-09-14 RX ORDER — SODIUM CHLORIDE 0.9 % (FLUSH) 0.9 %
5-40 SYRINGE (ML) INJECTION EVERY 12 HOURS SCHEDULED
Status: DISCONTINUED | OUTPATIENT
Start: 2023-09-14 | End: 2023-09-14 | Stop reason: HOSPADM

## 2023-09-14 NOTE — DISCHARGE INSTRUCTIONS
Cardiac Catheterization with Percutaneous Coronary Intervention     Percutaneous Coronary Intervention: What to Expect at Home  Percutaneous coronary intervention (PCI) is the name for procedures that are used to open a narrowed or blocked coronary artery. The two most common PCI procedures are coronary angioplasty and coronary stent placement. Your groin or arm may have a bruise and feel sore for a day or two after a percutaneous coronary intervention (PCI). You can do light activities around the house, but nothing strenuous for several days. Make sure to carry your stent card with you. Medication compliance is key in preventing future heart attacks and keeping your artery open. This care sheet gives you a general idea about how long it will take for you to recover. But each person recovers at a different pace. Follow the steps below to get better as quickly as possible. If you have any questions about follow up instructions please call [phone], or your cardiologist's office line. If there is an emergency call 911. Site Care  Check puncture site frequently for swelling or bleeding. If there is any bleeding, lie down (if access is groin), hold firm pressure with a clean towel or wash cloth. If bleeding doesn't stop, call 911. Notify your doctor for any redness, swelling, drainage, or oozing from the puncture site. If extremity becomes cold, numb or painful go to the emergency room. You may remove the bandage from your Right, Arm in 24 hours. You may shower at that point. No hot tubs, bath tubs, or swimming for 1 week. After shower, pat the incision dry and you can place a clean band-aid over the site. No lotions, ointments, or powders over puncture site for 1 week. Use a clean band-aid over the puncture site each day for 5 days. Change band-aid daily. Cold pack or ice can be placed on the area for 10 to 20 minutes to help with the soreness of the site.      Activity  Do not do strenuous take your medications daily as directed. Compliance with medications and follow up appointments key in preventing future heart attacks. Call 911 if you notice any of the following symptoms  You passed out  You have severe trouble breathing  You have sudden chest pain and shortness of breath or you cough up blood. You have symptoms of a heart attack:   Chest pain, pressure, or a strange feeling in the chest  Sweating  Shortness of breath  Nausea or vomiting  Pain, pressure or a strange feeling in the back, neck, jaw, or upper belly or in one or both shoulders or arms  Lightheadedness or sudden weakness  A fast or irregular heart rate. You have been diagnosed with angina or coronary artery disease, and you have symptoms that do not go away with rest or are not getting better within 5 minutes of nitroglycerin. You have symptoms of a stroke such as:  Facial drooping  Slurred or impaired speech  Arms unable to move or move unevenly  Time matters! Minutes matter, fast action can safe your life! Calling 911 is almost always the fastest way to get lifesaving treatment. Moderate Sedation Post Op Care  For a minor procedure or surgery, you will be given a sedative to help you relax. This drug will make you sleepy. It is usually given intravenously. If you had local anesthesia, you may feel some pain and discomfort as it wears off. If you have pain do not be afraid to say so. Pain medication works better if you take it before the pain gets bad. Side effects from the sedation include:  Drowsiness  Nausea and vomiting, which should not last long. Do not make any important decisions or do anything that requires attention to detail for 24 hours. No driving for 24 hours after the procedure or operate any machinery that could be dangerous until the medication wears off. Rest when you feel tired. Drink plenty of fluids  No alcoholic beverages for 24 hours.    Eat a normal diet, unless given other

## 2023-09-14 NOTE — PRE SEDATION
Sedation Plan  ASA: class 2 - patient with mild systemic disease     Mallampati class: II - soft palate, uvula, fauces visible. Sedation plan: moderate (conscious sedation)    Risks, benefits, and alternatives discussed with patient and spouse.

## 2023-09-18 LAB — ECHO BSA: 1.84 M2

## 2023-10-19 ENCOUNTER — OFFICE VISIT (OUTPATIENT)
Age: 73
End: 2023-10-19
Payer: MEDICARE

## 2023-10-19 VITALS
DIASTOLIC BLOOD PRESSURE: 80 MMHG | WEIGHT: 170 LBS | OXYGEN SATURATION: 97 % | BODY MASS INDEX: 30.12 KG/M2 | SYSTOLIC BLOOD PRESSURE: 108 MMHG | HEART RATE: 62 BPM | HEIGHT: 63 IN

## 2023-10-19 DIAGNOSIS — Z95.820 STATUS POST ANGIOPLASTY WITH STENT: Primary | ICD-10-CM

## 2023-10-19 DIAGNOSIS — I25.10 CORONARY ARTERY DISEASE, UNSPECIFIED VESSEL OR LESION TYPE, UNSPECIFIED WHETHER ANGINA PRESENT, UNSPECIFIED WHETHER NATIVE OR TRANSPLANTED HEART: ICD-10-CM

## 2023-10-19 PROCEDURE — 3079F DIAST BP 80-89 MM HG: CPT | Performed by: INTERNAL MEDICINE

## 2023-10-19 PROCEDURE — 3074F SYST BP LT 130 MM HG: CPT | Performed by: INTERNAL MEDICINE

## 2023-10-19 PROCEDURE — 1123F ACP DISCUSS/DSCN MKR DOCD: CPT | Performed by: INTERNAL MEDICINE

## 2023-10-19 PROCEDURE — 99215 OFFICE O/P EST HI 40 MIN: CPT | Performed by: INTERNAL MEDICINE

## 2023-10-19 PROCEDURE — G8399 PT W/DXA RESULTS DOCUMENT: HCPCS | Performed by: INTERNAL MEDICINE

## 2023-10-19 PROCEDURE — 3017F COLORECTAL CA SCREEN DOC REV: CPT | Performed by: INTERNAL MEDICINE

## 2023-10-19 PROCEDURE — G8417 CALC BMI ABV UP PARAM F/U: HCPCS | Performed by: INTERNAL MEDICINE

## 2023-10-19 PROCEDURE — G8428 CUR MEDS NOT DOCUMENT: HCPCS | Performed by: INTERNAL MEDICINE

## 2023-10-19 PROCEDURE — 1090F PRES/ABSN URINE INCON ASSESS: CPT | Performed by: INTERNAL MEDICINE

## 2023-10-19 PROCEDURE — 1036F TOBACCO NON-USER: CPT | Performed by: INTERNAL MEDICINE

## 2023-10-19 PROCEDURE — G8484 FLU IMMUNIZE NO ADMIN: HCPCS | Performed by: INTERNAL MEDICINE

## 2023-10-19 ASSESSMENT — PATIENT HEALTH QUESTIONNAIRE - PHQ9
SUM OF ALL RESPONSES TO PHQ QUESTIONS 1-9: 0
SUM OF ALL RESPONSES TO PHQ QUESTIONS 1-9: 0
2. FEELING DOWN, DEPRESSED OR HOPELESS: 0
SUM OF ALL RESPONSES TO PHQ9 QUESTIONS 1 & 2: 0
1. LITTLE INTEREST OR PLEASURE IN DOING THINGS: 0
SUM OF ALL RESPONSES TO PHQ QUESTIONS 1-9: 0
SUM OF ALL RESPONSES TO PHQ QUESTIONS 1-9: 0

## 2023-10-19 NOTE — PATIENT INSTRUCTIONS
[facility]  [address], [address]  [facility], [address] [address]  [phone]

## 2023-10-19 NOTE — PROGRESS NOTES
Asia Aleman presents today for   Chief Complaint   Patient presents with    Follow-up     F/u after left heart cath and stent 8.24.23 and 9.14.23    Edema     Left ankle edema on/off       Dalphine Wolbach preferred language for health care discussion is english/other. Is someone accompanying this pt? no    Is the patient using any DME equipment during OV? no    Depression Screening:  Depression: Not at risk (10/19/2023)    PHQ-2     PHQ-2 Score: 0        Learning Assessment:  Who is the primary learner? Patient    What is the preferred language for health care of the primary learner? ENGLISH    How does the primary learner prefer to learn new concepts? DEMONSTRATION    Answered By patient    Relationship to Learner SELF           Pt currently taking Anticoagulant therapy? no    Pt currently taking Antiplatelet therapy ? ASA 81 mg 1x daily and Palvix 75 mg 1x daily      Coordination of Care:  1. Have you been to the ER, urgent care clinic since your last visit? Hospitalized since your last visit? yes    2. Have you seen or consulted any other health care providers outside of the [address] [place] [address] since your last visit? Include any pap smears or colon screening.  no
LDLCALC 51.8 03/30/2023    LDLCALC 52.4 03/04/2022    LDLCALC 91 03/16/2021     Lab Results   Component Value Date    LABVLDL 24.2 03/30/2023    LABVLDL 17.6 03/04/2022    LABVLDL 25 03/16/2021     Lab Results   Component Value Date    CHOLHDLRATIO 3.3 03/30/2023    CHOLHDLRATIO 2.8 03/04/2022    CHOLHDLRATIO 4.3 03/16/2021       Impression and Plan:  Boaz Romero is a 68 y.o. with:    CAD s/p PCI OM2 8/14/23, ostium OM2 8/29/23  Carotid disease, on DAPT (followed by vascular)  Normal EF  S/p obstructive uropathy/ septic shock, with ureteral stent  H/o TIAs    On DAPT + BB + HI statin  LDL <70  Refer to cardiac rehab  RTC 6 months  45 mins, incl LHC results    Thank you for allowing me to participate in the care of your patient, please do not hesitate to call with questions or concerns.     Kindest Regards,    Bisi Gonzáles, DO

## 2023-11-22 RX ORDER — FLUTICASONE PROPIONATE 50 MCG
2 SPRAY, SUSPENSION (ML) NASAL DAILY
Qty: 16 G | Refills: 11 | Status: SHIPPED | OUTPATIENT
Start: 2023-11-22

## 2024-01-15 ENCOUNTER — HOSPITAL ENCOUNTER (OUTPATIENT)
Facility: HOSPITAL | Age: 74
Setting detail: SPECIMEN
Discharge: HOME | End: 2024-01-18
Payer: MEDICARE

## 2024-01-15 DIAGNOSIS — E11.21 TYPE 2 DIABETES WITH NEPHROPATHY (HCC): ICD-10-CM

## 2024-01-15 LAB
ALBUMIN SERPL-MCNC: 3.5 G/DL (ref 3.4–5)
ALBUMIN/GLOB SERPL: 1.1 (ref 0.8–1.7)
ALP SERPL-CCNC: 118 U/L (ref 45–117)
ALT SERPL-CCNC: 30 U/L (ref 13–56)
ANION GAP SERPL CALC-SCNC: 3 MMOL/L (ref 3–18)
AST SERPL-CCNC: 17 U/L (ref 10–38)
BILIRUB SERPL-MCNC: 0.3 MG/DL (ref 0.2–1)
BUN SERPL-MCNC: 16 MG/DL (ref 7–18)
BUN/CREAT SERPL: 13 (ref 12–20)
CALCIUM SERPL-MCNC: 9.2 MG/DL (ref 8.5–10.1)
CHLORIDE SERPL-SCNC: 106 MMOL/L (ref 100–111)
CHOLEST SERPL-MCNC: 122 MG/DL
CO2 SERPL-SCNC: 31 MMOL/L (ref 21–32)
CREAT SERPL-MCNC: 1.2 MG/DL (ref 0.6–1.3)
CREAT UR-MCNC: 490 MG/DL (ref 30–125)
GLOBULIN SER CALC-MCNC: 3.2 G/DL (ref 2–4)
GLUCOSE SERPL-MCNC: 127 MG/DL (ref 74–99)
HBA1C MFR BLD: 10.7 % (ref 4.2–5.6)
HDLC SERPL-MCNC: 39 MG/DL (ref 40–60)
HDLC SERPL: 3.1 (ref 0–5)
LDLC SERPL CALC-MCNC: 61.2 MG/DL (ref 0–100)
LIPID PANEL: ABNORMAL
MICROALBUMIN UR-MCNC: 14.3 MG/DL (ref 0–3)
MICROALBUMIN/CREAT UR-RTO: 29 MG/G (ref 0–30)
POTASSIUM SERPL-SCNC: 4.2 MMOL/L (ref 3.5–5.5)
PROT SERPL-MCNC: 6.7 G/DL (ref 6.4–8.2)
SODIUM SERPL-SCNC: 140 MMOL/L (ref 136–145)
TRIGL SERPL-MCNC: 109 MG/DL
VLDLC SERPL CALC-MCNC: 21.8 MG/DL

## 2024-01-15 PROCEDURE — 83036 HEMOGLOBIN GLYCOSYLATED A1C: CPT

## 2024-01-15 PROCEDURE — 80061 LIPID PANEL: CPT

## 2024-01-15 PROCEDURE — 82570 ASSAY OF URINE CREATININE: CPT

## 2024-01-15 PROCEDURE — 82043 UR ALBUMIN QUANTITATIVE: CPT

## 2024-01-15 PROCEDURE — 36415 COLL VENOUS BLD VENIPUNCTURE: CPT

## 2024-01-15 PROCEDURE — 80053 COMPREHEN METABOLIC PANEL: CPT

## 2024-01-17 ENCOUNTER — OFFICE VISIT (OUTPATIENT)
Age: 74
End: 2024-01-17
Payer: MEDICARE

## 2024-01-17 VITALS
BODY MASS INDEX: 29.95 KG/M2 | WEIGHT: 169 LBS | OXYGEN SATURATION: 99 % | SYSTOLIC BLOOD PRESSURE: 130 MMHG | HEART RATE: 51 BPM | DIASTOLIC BLOOD PRESSURE: 60 MMHG | HEIGHT: 63 IN

## 2024-01-17 DIAGNOSIS — I65.23 BILATERAL CAROTID ARTERY STENOSIS: Primary | ICD-10-CM

## 2024-01-17 PROBLEM — N18.30 CHRONIC RENAL DISEASE, STAGE III (HCC): Status: ACTIVE | Noted: 2024-01-17

## 2024-01-17 PROCEDURE — G8417 CALC BMI ABV UP PARAM F/U: HCPCS | Performed by: NURSE PRACTITIONER

## 2024-01-17 PROCEDURE — 1036F TOBACCO NON-USER: CPT | Performed by: NURSE PRACTITIONER

## 2024-01-17 PROCEDURE — G8399 PT W/DXA RESULTS DOCUMENT: HCPCS | Performed by: NURSE PRACTITIONER

## 2024-01-17 PROCEDURE — 99213 OFFICE O/P EST LOW 20 MIN: CPT | Performed by: NURSE PRACTITIONER

## 2024-01-17 PROCEDURE — G8427 DOCREV CUR MEDS BY ELIG CLIN: HCPCS | Performed by: NURSE PRACTITIONER

## 2024-01-17 PROCEDURE — 1090F PRES/ABSN URINE INCON ASSESS: CPT | Performed by: NURSE PRACTITIONER

## 2024-01-17 PROCEDURE — 1123F ACP DISCUSS/DSCN MKR DOCD: CPT | Performed by: NURSE PRACTITIONER

## 2024-01-17 PROCEDURE — 3017F COLORECTAL CA SCREEN DOC REV: CPT | Performed by: NURSE PRACTITIONER

## 2024-01-17 PROCEDURE — G8484 FLU IMMUNIZE NO ADMIN: HCPCS | Performed by: NURSE PRACTITIONER

## 2024-01-17 PROCEDURE — 3078F DIAST BP <80 MM HG: CPT | Performed by: NURSE PRACTITIONER

## 2024-01-17 PROCEDURE — 3075F SYST BP GE 130 - 139MM HG: CPT | Performed by: NURSE PRACTITIONER

## 2024-01-17 NOTE — PROGRESS NOTES
1. Have you been to the ER, urgent care clinic since your last visit? No  Hospitalized since your last visit?No    2. Have you seen or consulted any other health care providers outside of the [facility] since your last visit?  Include any pap smears or colon screening. No

## 2024-01-17 NOTE — PROGRESS NOTES
01/17/24        Cathie Walden        History and Physical    Cathie Walden is a 73 y.o. female with a history of CAD with left heart cath in September  and TIA several years ago. The encounter diagnosis was Bilateral carotid artery stenosis. Today she denies Patient denies lateralizing signs and symptoms, including unilateral weakness, paresthesias, vision changes and aphasia.     The most recent PVL performed on 1/1/2024 was reviewed and discussed with the patient:     Moderate (50-69%) stenosis in the right internal carotid artery.  Calcific and irregular plaque in the right internal carotid artery.    Moderate (50-69%) stenosis in the left internal carotid artery.  Calcific and irregular plaque in the left internal carotid artery.    Right vertebral artery signal is not detected suggesting occlusion.    Left vertebral artery is antegrade, with an early deceleration suggesting pre steal.    Multiphasic subclavian arteries bilaterally with turbulence suggestive of more proximal disease.    When compared with previous exam dated 06/23/2023, there is no significant change.    We discussed her medication regiment.  She is currently taking Plavix aspirin and a statin medication for her vascular health.  Physical Exam:    /60   Pulse 51   Ht 1.6 m (5' 3\")   Wt 76.7 kg (169 lb)   LMP  (LMP Unknown)   SpO2 99%   BMI 29.94 kg/m²    HEENT-PERRLA exactly movements intact, no scleral icterus, mucous membranes pink and moist  Neck-no JVD, no carotid bruits  Heart-regular rate and rhythm no murmurs, rubs or gallops  Extremities-no clubbing, cyanosis or edema. No wounds or gangrenous changes to the toes or feet. Skin is intact. Feet are pink warm and well-perfused bilaterally    Past Medical History:   Diagnosis Date    Allergic rhinitis     Carotid artery disease ([facility]) 2017    Dr Freeman    DM (diabetes mellitus) ([facility])     Dr Scott; microalbumin 8/23    GERD (gastroesophageal reflux disease)     H/O

## 2024-01-22 ENCOUNTER — OFFICE VISIT (OUTPATIENT)
Age: 74
End: 2024-01-22
Payer: MEDICARE

## 2024-01-22 VITALS
HEART RATE: 57 BPM | BODY MASS INDEX: 30.65 KG/M2 | WEIGHT: 173 LBS | DIASTOLIC BLOOD PRESSURE: 61 MMHG | TEMPERATURE: 97.5 F | HEIGHT: 63 IN | RESPIRATION RATE: 16 BRPM | SYSTOLIC BLOOD PRESSURE: 134 MMHG

## 2024-01-22 DIAGNOSIS — E11.21 TYPE 2 DIABETES WITH NEPHROPATHY (HCC): ICD-10-CM

## 2024-01-22 DIAGNOSIS — Z71.89 ADVANCED CARE PLANNING/COUNSELING DISCUSSION: ICD-10-CM

## 2024-01-22 DIAGNOSIS — K21.9 GERD WITHOUT ESOPHAGITIS: ICD-10-CM

## 2024-01-22 DIAGNOSIS — E78.5 DYSLIPIDEMIA: ICD-10-CM

## 2024-01-22 DIAGNOSIS — N18.31 STAGE 3A CHRONIC KIDNEY DISEASE (HCC): ICD-10-CM

## 2024-01-22 DIAGNOSIS — I25.10 CORONARY ARTERY DISEASE INVOLVING NATIVE CORONARY ARTERY OF NATIVE HEART WITHOUT ANGINA PECTORIS: ICD-10-CM

## 2024-01-22 DIAGNOSIS — G45.9 TIA (TRANSIENT ISCHEMIC ATTACK): ICD-10-CM

## 2024-01-22 DIAGNOSIS — Z00.00 MEDICARE ANNUAL WELLNESS VISIT, SUBSEQUENT: Primary | ICD-10-CM

## 2024-01-22 DIAGNOSIS — I10 ESSENTIAL HYPERTENSION: ICD-10-CM

## 2024-01-22 DIAGNOSIS — L65.9 HAIR THINNING: ICD-10-CM

## 2024-01-22 PROBLEM — I77.1 SUBCLAVIAN ARTERY STENOSIS, LEFT (HCC): Status: RESOLVED | Noted: 2021-12-02 | Resolved: 2024-01-22

## 2024-01-22 PROCEDURE — G8417 CALC BMI ABV UP PARAM F/U: HCPCS | Performed by: INTERNAL MEDICINE

## 2024-01-22 PROCEDURE — 99497 ADVNCD CARE PLAN 30 MIN: CPT | Performed by: INTERNAL MEDICINE

## 2024-01-22 PROCEDURE — G8427 DOCREV CUR MEDS BY ELIG CLIN: HCPCS | Performed by: INTERNAL MEDICINE

## 2024-01-22 PROCEDURE — 2022F DILAT RTA XM EVC RTNOPTHY: CPT | Performed by: INTERNAL MEDICINE

## 2024-01-22 PROCEDURE — 1090F PRES/ABSN URINE INCON ASSESS: CPT | Performed by: INTERNAL MEDICINE

## 2024-01-22 PROCEDURE — 1123F ACP DISCUSS/DSCN MKR DOCD: CPT | Performed by: INTERNAL MEDICINE

## 2024-01-22 PROCEDURE — 99214 OFFICE O/P EST MOD 30 MIN: CPT | Performed by: INTERNAL MEDICINE

## 2024-01-22 PROCEDURE — G8484 FLU IMMUNIZE NO ADMIN: HCPCS | Performed by: INTERNAL MEDICINE

## 2024-01-22 PROCEDURE — G0439 PPPS, SUBSEQ VISIT: HCPCS | Performed by: INTERNAL MEDICINE

## 2024-01-22 PROCEDURE — 1036F TOBACCO NON-USER: CPT | Performed by: INTERNAL MEDICINE

## 2024-01-22 PROCEDURE — 3017F COLORECTAL CA SCREEN DOC REV: CPT | Performed by: INTERNAL MEDICINE

## 2024-01-22 PROCEDURE — 3075F SYST BP GE 130 - 139MM HG: CPT | Performed by: INTERNAL MEDICINE

## 2024-01-22 PROCEDURE — G8399 PT W/DXA RESULTS DOCUMENT: HCPCS | Performed by: INTERNAL MEDICINE

## 2024-01-22 PROCEDURE — 3078F DIAST BP <80 MM HG: CPT | Performed by: INTERNAL MEDICINE

## 2024-01-22 PROCEDURE — 3046F HEMOGLOBIN A1C LEVEL >9.0%: CPT | Performed by: INTERNAL MEDICINE

## 2024-01-22 RX ORDER — OLMESARTAN MEDOXOMIL 20 MG/1
20 TABLET ORAL DAILY
Qty: 30 TABLET | Refills: 5 | Status: SHIPPED | OUTPATIENT
Start: 2024-01-22

## 2024-01-22 ASSESSMENT — PATIENT HEALTH QUESTIONNAIRE - PHQ9
SUM OF ALL RESPONSES TO PHQ QUESTIONS 1-9: 0
SUM OF ALL RESPONSES TO PHQ9 QUESTIONS 1 & 2: 0
SUM OF ALL RESPONSES TO PHQ QUESTIONS 1-9: 0
1. LITTLE INTEREST OR PLEASURE IN DOING THINGS: 0
2. FEELING DOWN, DEPRESSED OR HOPELESS: 0

## 2024-01-22 ASSESSMENT — LIFESTYLE VARIABLES
HOW MANY STANDARD DRINKS CONTAINING ALCOHOL DO YOU HAVE ON A TYPICAL DAY: PATIENT DOES NOT DRINK
HOW OFTEN DO YOU HAVE A DRINK CONTAINING ALCOHOL: NEVER

## 2024-01-22 NOTE — PATIENT INSTRUCTIONS

## 2024-01-22 NOTE — ACP (ADVANCE CARE PLANNING)
Advance Care Planning     Advance Care Planning (ACP) Physician/NP/PA Conversation    Date of Conversation: 1/22/2024  Conducted with: Patient with Decision Making Capacity    Healthcare Decision Maker:      Primary Decision Maker: Gunjan Ga - Brother/Sister - [phone]    Primary Decision Maker: Alice Walden - Martha - [phone]    Click here to complete Healthcare Decision Makers including selection of the Healthcare Decision Maker Relationship (ie \"Primary\")  Today we documented Decision Maker(s) consistent with Legal Next of Kin hierarchy.    Care Preferences:    Hospitalization:  \"If your health worsens and it becomes clear that your chance of recovery is unlikely, what would be your preference regarding hospitalization?\"  The patient would prefer hospitalization.    Ventilation:  \"If you were unable to breath on your own and your chance of recovery was unlikely, what would be your preference about the use of a ventilator (breathing machine) if it was available to you?\"  The patient would desire the use of a ventilator.    Resuscitation:  \"In the event your heart stopped as a result of an underlying serious health condition, would you want attempts made to restart your heart, or would you prefer a natural death?\"  Yes, attempt to resuscitate.    ventilation preferences, hospitalization preferences, and resuscitation preferences    Conversation Outcomes / Follow-Up Plan:  ACP in process - information provided, considering goals and options  Reviewed DNR/DNI and patient elects Full Code (Attempt Resuscitation)    Length of Voluntary ACP Conversation in minutes:  16 minutes    JULIÁN ARELLANO MD

## 2024-01-22 NOTE — PROGRESS NOTES
Medicare Annual Wellness Visit    Cathie Walden is here for Medicare AW    Assessment & Plan   Advanced care planning/counseling discussion  Essential hypertension  -     olmesartan (BENICAR) 20 MG tablet; Take 1 tablet by mouth daily, Disp-30 tablet, R-5Normal  TIA (transient ischemic attack)  GERD without esophagitis  Type 2 diabetes with nephropathy (HCC)  -     CBC with Auto Differential; Future  -     HEMOGLOBIN A1C W/O EAG; Future  -     Microalbumin / Creatinine Urine Ratio; Future  -     Basic Metabolic Panel; Future  Stage 3a chronic kidney disease (HCC)  Dyslipidemia  Hair thinning  -     External Referral To Dermatology  Coronary artery disease involving native coronary artery of native heart without angina pectoris  Medicare annual wellness visit, subsequent    Recommendations for Preventive Services Due: see orders and patient instructions/AVS.  Recommended screening schedule for the next 5-10 years is provided to the patient in written form: see Patient Instructions/AVS.     Return for Medicare Annual Wellness Visit in 1 year.     Subjective       Patient's complete Health Risk Assessment and screening values have been reviewed and are found in Flowsheets. The following problems were reviewed today and where indicated follow up appointments were made and/or referrals ordered.    Positive Risk Factor Screenings with Interventions:                Activity, Diet, and Weight:  On average, how many days per week do you engage in moderate to strenuous exercise (like a brisk walk)?: 3 days  On average, how many minutes do you engage in exercise at this level?: 20 min    Do you eat balanced/healthy meals regularly?: (!) No    Body mass index is 30.65 kg/m². (!) Abnormal    Do you eat balanced/healthy meals regularly Interventions:  Patient declines any further evaluation or treatment  Obesity Interventions:  Patient declines any further evaluation or treatment            Dentist Screen:  Have you 
Cathierd Walden presents today for   Chief Complaint   Patient presents with    Medicare AWV     1. \"Have you been to the ER, urgent care clinic since your last visit?  Hospitalized since your last visit?\" No    2. \"Have you seen or consulted any other health care providers outside of the [facility] since your last visit?\" Yes     3. For patients aged 45-75: Has the patient had a colonoscopy / FIT/ Cologuard? No; scheduled for March      If the patient is female:    4. For patients aged 40-74: Has the patient had a mammogram within the past 2 years? NA - based on age or sex      5. For patients aged 21-65: Has the patient had a pap smear? Yes - no Care Gap present    
   Highest education level: Not on file   Occupational History    Occupation: ret teacher [employer]   Tobacco Use    Smoking status: Never     Passive exposure: Never    Smokeless tobacco: Never   Vaping Use    Vaping Use: Never used   Substance and Sexual Activity    Alcohol use: Not Currently     Alcohol/week: 2.0 standard drinks of alcohol    Drug use: No    Sexual activity: Not Currently   Other Topics Concern    Not on file   Social History Narrative    Not on file     Social Determinants of Health     Financial Resource Strain: Low Risk  (6/9/2023)    Overall Financial Resource Strain (CARDIA)     Difficulty of Paying Living Expenses: Not hard at all   Food Insecurity: Not on file (6/9/2023)   Transportation Needs: Unknown (6/9/2023)    PRAPARE - Transportation     Lack of Transportation (Medical): Not on file     Lack of Transportation (Non-Medical): No   Physical Activity: Insufficiently Active (1/22/2024)    Exercise Vital Sign     Days of Exercise per Week: 3 days     Minutes of Exercise per Session: 20 min   Stress: Not on file   Social Connections: Not on file   Intimate Partner Violence: Not on file   Housing Stability: Unknown (6/9/2023)    Housing Stability Vital Sign     Unable to Pay for Housing in the Last Year: Not on file     Number of Places Lived in the Last Year: Not on file     Unstable Housing in the Last Year: No     Current Outpatient Medications   Medication Sig    Multiple Vitamins-Minerals (HAIR SKIN AND NAILS FORMULA) TABS Take 1 tablet by mouth daily    olmesartan (BENICAR) 20 MG tablet Take 1 tablet by mouth daily    fluticasone (FLONASE) 50 MCG/ACT nasal spray INSTILL 2 SPRAYS INTO EACH NOSTRIL ONCE DAILY    TRULICITY 1.5 MG/0.5ML SC injection Inject 0.5 mLs into the skin once a week    Multiple Vitamins-Minerals (CENTRUM SILVER ULTRA WOMENS PO) Take by mouth    metFORMIN (GLUCOPHAGE) 1000 MG tablet Take 1 tablet by mouth daily (with breakfast)    aspirin (ASPIRIN CHILDRENS) 81 MG

## 2024-02-16 RX ORDER — CARVEDILOL 25 MG/1
25 TABLET ORAL 2 TIMES DAILY WITH MEALS
Qty: 180 TABLET | Refills: 3 | Status: SHIPPED | OUTPATIENT
Start: 2024-02-16

## 2024-04-19 ENCOUNTER — OFFICE VISIT (OUTPATIENT)
Age: 74
End: 2024-04-19
Payer: MEDICARE

## 2024-04-19 VITALS
HEIGHT: 63 IN | HEART RATE: 67 BPM | DIASTOLIC BLOOD PRESSURE: 76 MMHG | OXYGEN SATURATION: 96 % | BODY MASS INDEX: 31.36 KG/M2 | WEIGHT: 177 LBS | SYSTOLIC BLOOD PRESSURE: 132 MMHG

## 2024-04-19 DIAGNOSIS — Z95.820 STATUS POST ANGIOPLASTY WITH STENT: Primary | ICD-10-CM

## 2024-04-19 DIAGNOSIS — I25.10 CORONARY ARTERY DISEASE, UNSPECIFIED VESSEL OR LESION TYPE, UNSPECIFIED WHETHER ANGINA PRESENT, UNSPECIFIED WHETHER NATIVE OR TRANSPLANTED HEART: ICD-10-CM

## 2024-04-19 PROCEDURE — 1090F PRES/ABSN URINE INCON ASSESS: CPT | Performed by: INTERNAL MEDICINE

## 2024-04-19 PROCEDURE — 99215 OFFICE O/P EST HI 40 MIN: CPT | Performed by: INTERNAL MEDICINE

## 2024-04-19 PROCEDURE — 3075F SYST BP GE 130 - 139MM HG: CPT | Performed by: INTERNAL MEDICINE

## 2024-04-19 PROCEDURE — 3078F DIAST BP <80 MM HG: CPT | Performed by: INTERNAL MEDICINE

## 2024-04-19 PROCEDURE — G8417 CALC BMI ABV UP PARAM F/U: HCPCS | Performed by: INTERNAL MEDICINE

## 2024-04-19 PROCEDURE — G8427 DOCREV CUR MEDS BY ELIG CLIN: HCPCS | Performed by: INTERNAL MEDICINE

## 2024-04-19 PROCEDURE — 1036F TOBACCO NON-USER: CPT | Performed by: INTERNAL MEDICINE

## 2024-04-19 PROCEDURE — G8399 PT W/DXA RESULTS DOCUMENT: HCPCS | Performed by: INTERNAL MEDICINE

## 2024-04-19 PROCEDURE — 3017F COLORECTAL CA SCREEN DOC REV: CPT | Performed by: INTERNAL MEDICINE

## 2024-04-19 PROCEDURE — 1123F ACP DISCUSS/DSCN MKR DOCD: CPT | Performed by: INTERNAL MEDICINE

## 2024-04-19 ASSESSMENT — PATIENT HEALTH QUESTIONNAIRE - PHQ9
1. LITTLE INTEREST OR PLEASURE IN DOING THINGS: NOT AT ALL
SUM OF ALL RESPONSES TO PHQ QUESTIONS 1-9: 0
2. FEELING DOWN, DEPRESSED OR HOPELESS: NOT AT ALL
SUM OF ALL RESPONSES TO PHQ QUESTIONS 1-9: 0
SUM OF ALL RESPONSES TO PHQ9 QUESTIONS 1 & 2: 0

## 2024-04-19 NOTE — PROGRESS NOTES
Cathie Walden presents today for   Chief Complaint   Patient presents with    Follow-up     6 month f/u     Cardiac Clearance     Colonoscopy on 4/30/24 with [facility]        Cathie Walden preferred language for health care discussion is english/other.    Is someone accompanying this pt? NO    Is the patient using any DME equipment during OV? NO    Depression Screening:  Depression: Not at risk (4/19/2024)    PHQ-2     PHQ-2 Score: 0        Learning Assessment:  Who is the primary learner? Patient    What is the preferred language for health care of the primary learner? ENGLISH    How does the primary learner prefer to learn new concepts? DEMONSTRATION    Answered By patient    Relationship to Learner SELF           Pt currently taking Anticoagulant therapy? NO    Pt currently taking Antiplatelet therapy ? ASA 81 MG 1X DAILY AND PLAVIX 75 MG 1X DAILY       Coordination of Care:  1. Have you been to the ER, urgent care clinic since your last visit? Hospitalized since your last visit? NO    2. Have you seen or consulted any other health care providers outside of the [facility] since your last visit? Include any pap smears or colon screening. NO

## 2024-04-19 NOTE — PROGRESS NOTES
Cathie Walden    Preop, abn nuc    HPI    Cathie Walden is a 73 y.o. with carotid disease and h/o TIA, who I met 6/2023 when referred for perioperative risk ratification prior to urological procedure, with abnormal cardiac testing.    You know patient was admitted for urosepsis with ureteral stone and right hydronephrosis at [facility] earlier in May she tells me she currently has a stent that needs to be removed needs to have another kidney stone taken out and then potentially another ureteral stent placed.  She is having hematuria and some flank pain.    She is obviously not been as active but I guess was having some shortness of breath so had a nuclear stress test 4/13/23: EF 52% but she had a moderate severity left ventricular stress perfusion defect in the inferolateral segment that was partially reversible, no TID.    She is now s/p staged PCI and doing well. She has no CV complaints, no more pain at all but still difficult functional status.    Past Medical History:   Diagnosis Date    Allergic rhinitis     CAD (coronary artery disease)     Dr Emery; cath (8/23) occ mid-RCA, 30% calc LAD, 80-85% prox DMi, 90-95% dist Cx, 99% OM2 which was ptca and stented; staged stent (9/23) residual 70% OM2 stented, 85% DMi stented    Carotid artery disease ([facility]) 2017    Dr Freeman; [place]    DM (diabetes mellitus) (formerly [facility])     Dr Scott; microalbumin 8/23    GERD (gastroesophageal reflux disease)     H/O cardiovascular stress test     thallium neg ef 70% (3/12); Dr Olmos; NST neg, ef 76% (2018); NST mod risk, ef 50%, inferolat isch, TID 1.12 (6/23)    H/O echocardiogram     conc lvh, ef 60%, DD (3/12); nl lv, ef 68%, conc lvh, no wma, mild dd, no valvular abn, neg bubble study (11/17 - [facility]); nl lv, ef 72%, no wma, gr 1 dd, nl valves (8/18)    History of Holter monitoring 01/2018    few pac/pvc, 4 beat run svt rate 156 (1/18)    Hyperlipidemia     Hypertension     Nephrolithiasis

## 2024-04-22 RX ORDER — ATORVASTATIN CALCIUM 40 MG/1
TABLET, FILM COATED ORAL
Qty: 90 TABLET | Refills: 2 | Status: SHIPPED | OUTPATIENT
Start: 2024-04-22

## 2024-05-01 DIAGNOSIS — I10 ESSENTIAL HYPERTENSION: ICD-10-CM

## 2024-05-03 RX ORDER — OLMESARTAN MEDOXOMIL 20 MG/1
20 TABLET ORAL DAILY
Qty: 90 TABLET | Refills: 3 | Status: SHIPPED | OUTPATIENT
Start: 2024-05-03

## 2024-05-13 ENCOUNTER — HOSPITAL ENCOUNTER (OUTPATIENT)
Facility: HOSPITAL | Age: 74
Setting detail: RECURRING SERIES
Discharge: HOME | End: 2024-05-16
Payer: MEDICARE

## 2024-05-13 VITALS — WEIGHT: 178.8 LBS | BODY MASS INDEX: 31.67 KG/M2

## 2024-05-13 PROCEDURE — 93798 PHYS/QHP OP CAR RHAB W/ECG: CPT

## 2024-05-13 ASSESSMENT — PATIENT HEALTH QUESTIONNAIRE - PHQ9
9. THOUGHTS THAT YOU WOULD BE BETTER OFF DEAD, OR OF HURTING YOURSELF: NOT AT ALL
4. FEELING TIRED OR HAVING LITTLE ENERGY: NOT AT ALL
5. POOR APPETITE OR OVEREATING: NOT AT ALL
SUM OF ALL RESPONSES TO PHQ9 QUESTIONS 1 & 2: 0
10. IF YOU CHECKED OFF ANY PROBLEMS, HOW DIFFICULT HAVE THESE PROBLEMS MADE IT FOR YOU TO DO YOUR WORK, TAKE CARE OF THINGS AT HOME, OR GET ALONG WITH OTHER PEOPLE: NOT DIFFICULT AT ALL
SUM OF ALL RESPONSES TO PHQ QUESTIONS 1-9: 0
7. TROUBLE CONCENTRATING ON THINGS, SUCH AS READING THE NEWSPAPER OR WATCHING TELEVISION: NOT AT ALL
6. FEELING BAD ABOUT YOURSELF - OR THAT YOU ARE A FAILURE OR HAVE LET YOURSELF OR YOUR FAMILY DOWN: NOT AT ALL
SUM OF ALL RESPONSES TO PHQ QUESTIONS 1-9: 0
2. FEELING DOWN, DEPRESSED OR HOPELESS: NOT AT ALL
8. MOVING OR SPEAKING SO SLOWLY THAT OTHER PEOPLE COULD HAVE NOTICED. OR THE OPPOSITE, BEING SO FIGETY OR RESTLESS THAT YOU HAVE BEEN MOVING AROUND A LOT MORE THAN USUAL: NOT AT ALL
1. LITTLE INTEREST OR PLEASURE IN DOING THINGS: NOT AT ALL

## 2024-05-13 ASSESSMENT — EJECTION FRACTION: EF_VALUE: 52

## 2024-05-13 ASSESSMENT — EXERCISE STRESS TEST
PEAK_BP: 149/77
PEAK_RPD: 0
PEAK_METS: 2.6
PEAK_BP: 149/77
PEAK_HR: 97
PEAK_RPE: 11
PEAK_HR: 97
PEAK_BP: 149/77
PEAK_RPE: 9

## 2024-05-13 ASSESSMENT — LIFESTYLE VARIABLES
ALCOHOL_AMOUNT: 1
ALCOHOL_USE: SPECIAL

## 2024-05-13 ASSESSMENT — NEW YORK HEART ASSOCIATION (NYHA) CLASSIFICATION: NYHA FUNCTIONAL CLASS: CLASS II

## 2024-05-13 NOTE — PROGRESS NOTES
CARDIAC REHAB INITIAL ITP FOR REVIEW AND SIGNATURE    Cathie Walden 73 y.o. presented to cardiac rehab for an intake and a six minute walk test today with a primary diagnosis of PCI/Stent.  Patient's EF is 52%. Cathie Walden has a history of diabetes, hypertension, hyperlipidemia, coronary artery disease, history of TIAs, and status post coronary artery stenting. Cardiac risk factors include family history, dyslipidemia, diabetes mellitus, hypertension and these were reviewed with patient.      Cathie Walden is   and lives with their family. PHQ-9, depression score, is 0 and this is considered to be normal. The result was discussed with patient who confirms score to be accurate and if above a 5, a copy of the results were sent to patient's PCP.    Patient:   has not participated in a cardiac rehab program in the past.    does not  have pacemaker.   does not  have NTG prescribed.  does have diabetes.   does not  smoke.  Medications were reviewed.  Allergies reviewed .  Fall risk assessment completed .     During 6 Minute Walk Test: patient denied chest pain; patient denied SOB; cardiac rhythm was Normal Sinus Rhythm.    Cathie Walden will attend exercise and educational sessions 3 days a week in cardiac rehab for 36 sessions.     Exceptions/Limitations noted during intake include: none noted.      Goals for Rehab:    Patient name: Cathie Walden : 1950       Goals Comments   1. Exercise Goal(s): Increase maximum exercise METs from 2.6 to 4.6  by end of program.   [x] initial  [] met                  [] not met  [] progressing  Patient will be educated on importance of increasing resistance and duration in program to gain cardiovascular benefit.   2. Nutrition Goal(s): Adhere to a heart healthy diet by end of program. [x] initial  [] met                  [] not met  [] progressing Patient will be educated on the importance of following a Heart Healthy Diet and monitoring blood  [facility] at [place] [id number] [address] [facility], [address] [place] [address] W: [phone]  F: [phone] Hematology Oncology established visit Reason for Visit:  
Ismael Shook is a 62 y.o. male who is seen here for follow up of pancreatic cancer, new diagnosis. Hematology / Oncology Treatment History:  
 
Diagnosis: Pancreatic, adenocarcinoma, moderately to poorly diiferentiated Stage: IV [[id number]] Pathology: 2020 pancreatic body, FNA ;adenocarcinoma, moderately to poorly diiferentiated Prior Treatment: none Current Treatment:  FOLFIRINOX every 2 weeks, to start 20. History of Present Illness: Mr. Ismael Shook is a 62 y.o. male admitted on 2020 from the ED with worsening abd pain and constipation. Rates pain on admission 9/10. Recent dx of pancreatic cancer during last hospitalization. Pt reports has not had a BM since last Wed; stopped taking the Senna-docusate last week; he thought it was causing his stomach to hurt more. He was unaware that it was part of a bowel regimen and thought it was pain medication. Has had some nausea but denies vomiting. Decreased appetite. Denies SOB. ED CT scan shows subacute pancreatitis, pancreatic body adenocarcinoma, retroperitoneal remington metastases, peritoneal carcinomatosis. Pt would like his sister to be able to hear about the treatment plans; she is coming from [place] in the am. Mallika Both to port placement; defininelty wants to have treatment. 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 Smokes cigarettes: 1 ppd x 30 yrs. Rare EtOH use. : 3 kids; twins 29 yrs old and 25 yr old. Interval History:  
Patient here for follow up of pancreatic cancer and start of FOLFIRINOX. Past Medical History:  
Diagnosis Date  Gout  Pancreatic cancer ([facility] [address]  Psoriasis  Tobacco abuse Past Surgical History:  
Procedure Laterality Date  HX ORTHOPAEDIC    
 femur repair  HX ORTHOPAEDIC    
 lumbar compressed fracture  IR INSERT TUNL CVC W PORT OVER 5 YEARS  11/24/2020 Social History Tobacco Use  Smoking status: Current Every Day Smoker Packs/day: 1.00  Smokeless tobacco: Never Used  Tobacco comment: last week was last cigarette Substance Use Topics  Alcohol use: Yes Family History Problem Relation Age of Onset  Pancreatic Cancer Brother Current Outpatient Medications Medication Sig  prochlorperazine (Compazine) 10 mg tablet Take 1 Tab by mouth every six (6) hours as needed for Nausea or Vomiting.  ondansetron hcl (ZOFRAN) 8 mg tablet Take 1 Tab by mouth every eight (8) hours as needed for Nausea or Vomiting.  lidocaine-prilocaine (EMLA) topical cream Apply a dime size amount to port site 30 minutes before access to prevent pain.  dexAMETHasone (DECADRON) 4 mg tablet Take 8mg on days 2 and 3 after treatment to prevent nausea.  oxyCODONE ER (OxyCONTIN) 10 mg ER tablet Take 1 Tab by mouth every twelve (12) hours for 30 days. Max Daily Amount: 20 mg.  
 oxyCODONE-acetaminophen (Percocet) 5-325 mg per tablet Take 1 Tab by mouth every four (4) hours as needed for Pain.  senna-docusate (PERICOLACE) 8.6-50 mg per tablet Take 2 Tabs by mouth daily. No current facility-administered medications for this visit. No Known Allergies Review of Systems: A complete review of systems was obtained, negative except as described above. Physical Exam:  
 
There were no vitals taken for this visit. ECOG PS: 1 General: No distress Eyes: Anicteric sclerae HENT: Atraumatic Neck: Supple Respiratory: Normal respiratory effort CV: No peripheral edema GI: Soft, nontender, nondistended, no masses, no hepatomegaly, no splenomegaly Skin: No rashes, ecchymoses, or petechiae Psych: Alert, oriented, appropriate affect, normal judgment/insight Results:  
 
Lab Results Component Value Date/Time WBC 6.1 11/23/2020 06:22 AM  
 HGB 12.8 11/23/2020 06:22 AM  
 HCT 37.8 11/23/2020 06:22 AM  
 PLATELET 625 46/19/5598 06:22 AM  
 MCV 91.1 11/23/2020 06:22 AM  
 ABS. NEUTROPHILS 6.9 11/22/2020 03:22 PM  
 
Lab Results Component Value Date/Time Sodium 139 11/23/2020 06:22 AM  
 Potassium 3.6 11/23/2020 06:22 AM  
 Chloride 107 11/23/2020 06:22 AM  
 CO2 27 11/23/2020 06:22 AM  
 Glucose 82 11/23/2020 06:22 AM  
 BUN 14 11/23/2020 06:22 AM  
 Creatinine 0.79 11/23/2020 06:22 AM  
 GFR est AA >60 11/23/2020 06:22 AM  
 GFR est non-AA >60 11/23/2020 06:22 AM  
 Calcium 7.8 (L) 11/23/2020 06:22 AM  
 
Lab Results Component Value Date/Time Bilirubin, total 0.4 11/23/2020 06:22 AM  
 ALT (SGPT) 21 11/23/2020 06:22 AM  
 Alk. phosphatase 56 11/23/2020 06:22 AM  
 Protein, total 6.2 (L) 11/23/2020 06:22 AM  
 Albumin 2.7 (L) 11/23/2020 06:22 AM  
 Globulin 3.5 11/23/2020 06:22 AM  
 
Lab Results Component Value Date/Time Sed rate, automated 4 11/16/2017 03:56 PM  
 C-Reactive protein <0.29 11/16/2017 03:56 PM  
 TSH 3.36 11/12/2020 05:28 AM  
 Lipase 115 11/23/2020 06:22 AM  
 
Lab Results Component Value Date/Time INR 1.0 09/23/2010 03:05 AM  
 aPTT 28.0 09/23/2010 03:05 AM  
 
Lab Results Component Value Date/Time CEA 4.9 11/13/2020 05:29 AM  
 Carbohydrate Antigen 19-9, (CA 19-9) 5,607 (H) 11/13/2020 05:29 AM  
 
 
11/22/2020 XR ABD FLAT IMPRESSION: 
 No evidence for acute abnormality 11/23/2020 CT ABD PELV W CONT IMPRESSION:  
1. Subacute pancreatitis, with small walled-off necrosis (\"pseudocyst\") in the lesser sac. 2. Pancreatic body adenocarcinoma. 3. Occluded splenic vein. Consequent, extensive, submucosal, gastric varices in the cardia. 4. Encasement of the splenic artery.  Abutment of the celiac artery, SMV, and SMA. 5. Retroperitoneal remington metastases. 6. Peritoneal carcinomatosis. 
  
 
Assessment and Recommendations:  
 
61 yo male with recent dx of pancreatic cancer admitted with abd pain. 1. Pancreatic adenocarcinoma:  
Diagnosed 11/18/2020 and now appears to have Stage IV disease based on peritoneal carcinomatosis seen on CT. Also has retroperitoneal remington metastases and encasement of vasculature. Was scheduled to be seen in our clinic on 11/24 to discuss treatment options. Discussed with him today that his disease is not curable, but is treatable. Emphasized the importance of genetic testing, germline and somatic. As his brother passed away from pancreas cancer, pt may have BRCA or PALB2 mutation, would allow him to possible be treated with oral medication in the future. I recommend palliative chemotherapy with FOLFIRINOX regimen. We discussed the risks and benefits of FOLFIRINOX chemotherapy, including potential side effects. These include but are not limited to fatigue, nausea vomiting, diarrhea, neuropathy, taste changes, cold intolerance, esophageal spasm, allergic reactions, alopecia, mucositis, myelosuppression, risk for infection, infertility, and rarely, death. Rarely, a patient may have a condition where they do not metabolize fluorouracil appropriately (called DPD deficiency), and they may have excessive toxicity. A Port-A-Cath will be required in order to deliver the continuous infusion. The patient has consented to beginning therapy. CA 19-9 5607 at diagnosis. Supportive medications include: EMLA cream, zofran, compazine, dexamethasone. -- Germline and somatic genetic testing needed. -- Proceed with cycle 1 FOLFIRNOX, MD/NP visit. -- Follow up in 2 weeks for cycle 2, MD/NP visit. 2. H/o subacute pancreatitis:  
Was treated with IVF in hospital.  
 
3. Constipation:  
2/2 opioids and bowel regimen adjusted. Continue Miralax daily and Docusate/Senna 1-2 times daily. 4. Occluded splenic vein: 
Originally recommended anticoagulation, but after discussion with radiology, there is no clot. This is occlusion by the cancer and no anticoagulation is needed. 5. Neoplasm related pain:  
Well controlled on Oxycontin 10mg q12h and Percocet prn.   
 
6. Family h/o pancreatic cancer: 
Concerning for genetic predisposition. Recommend genetic testing as outpatient (BRCA 1/2, PALB2, etc.) I have personally seen and evaluated the patient in conjunction with Manish Woodward NP. I find the patient's history and physical exam are consistent with the NP's documentation. I agree with the above assessment and plan, which I have edited if needed.  
 
 
Signed By: Kamilah Julian MD

## 2024-05-14 ENCOUNTER — HOSPITAL ENCOUNTER (OUTPATIENT)
Facility: HOSPITAL | Age: 74
Setting detail: SPECIMEN
Discharge: HOME | End: 2024-05-17
Payer: MEDICARE

## 2024-05-14 DIAGNOSIS — E11.21 TYPE 2 DIABETES WITH NEPHROPATHY (HCC): ICD-10-CM

## 2024-05-14 LAB
ANION GAP SERPL CALC-SCNC: 5 MMOL/L (ref 3–18)
BASOPHILS # BLD: 0 K/UL (ref 0–0.1)
BASOPHILS NFR BLD: 0 % (ref 0–2)
BUN SERPL-MCNC: 14 MG/DL (ref 7–18)
BUN/CREAT SERPL: 13 (ref 12–20)
CALCIUM SERPL-MCNC: 9.4 MG/DL (ref 8.5–10.1)
CHLORIDE SERPL-SCNC: 105 MMOL/L (ref 100–111)
CO2 SERPL-SCNC: 31 MMOL/L (ref 21–32)
CREAT SERPL-MCNC: 1.04 MG/DL (ref 0.6–1.3)
CREAT UR-MCNC: 184 MG/DL (ref 30–125)
DIFFERENTIAL METHOD BLD: ABNORMAL
EOSINOPHIL # BLD: 0.2 K/UL (ref 0–0.4)
EOSINOPHIL NFR BLD: 3 % (ref 0–5)
ERYTHROCYTE [DISTWIDTH] IN BLOOD BY AUTOMATED COUNT: 13.4 % (ref 11.6–14.5)
GLUCOSE SERPL-MCNC: 63 MG/DL (ref 74–99)
HBA1C MFR BLD: 9.7 % (ref 4.2–5.6)
HCT VFR BLD AUTO: 44.7 % (ref 35–45)
HGB BLD-MCNC: 14.4 G/DL (ref 12–16)
IMM GRANULOCYTES # BLD AUTO: 0 K/UL (ref 0–0.04)
IMM GRANULOCYTES NFR BLD AUTO: 0 % (ref 0–0.5)
LYMPHOCYTES # BLD: 1.3 K/UL (ref 0.9–3.6)
LYMPHOCYTES NFR BLD: 18 % (ref 21–52)
MCH RBC QN AUTO: 28.2 PG (ref 24–34)
MCHC RBC AUTO-ENTMCNC: 32.2 G/DL (ref 31–37)
MCV RBC AUTO: 87.6 FL (ref 78–100)
MICROALBUMIN UR-MCNC: 16.5 MG/DL (ref 0–3)
MICROALBUMIN/CREAT UR-RTO: 90 MG/G (ref 0–30)
MONOCYTES # BLD: 0.6 K/UL (ref 0.05–1.2)
MONOCYTES NFR BLD: 8 % (ref 3–10)
NEUTS SEG # BLD: 5.3 K/UL (ref 1.8–8)
NEUTS SEG NFR BLD: 71 % (ref 40–73)
NRBC # BLD: 0 K/UL (ref 0–0.01)
NRBC BLD-RTO: 0 PER 100 WBC
PLATELET # BLD AUTO: 272 K/UL (ref 135–420)
PMV BLD AUTO: 12.4 FL (ref 9.2–11.8)
POTASSIUM SERPL-SCNC: 4 MMOL/L (ref 3.5–5.5)
RBC # BLD AUTO: 5.1 M/UL (ref 4.2–5.3)
SODIUM SERPL-SCNC: 141 MMOL/L (ref 136–145)
WBC # BLD AUTO: 7.4 K/UL (ref 4.6–13.2)

## 2024-05-14 PROCEDURE — 85025 COMPLETE CBC W/AUTO DIFF WBC: CPT

## 2024-05-14 PROCEDURE — 82043 UR ALBUMIN QUANTITATIVE: CPT

## 2024-05-14 PROCEDURE — 36415 COLL VENOUS BLD VENIPUNCTURE: CPT

## 2024-05-14 PROCEDURE — 83036 HEMOGLOBIN GLYCOSYLATED A1C: CPT

## 2024-05-14 PROCEDURE — 80048 BASIC METABOLIC PNL TOTAL CA: CPT

## 2024-05-14 PROCEDURE — 82570 ASSAY OF URINE CREATININE: CPT

## 2024-05-17 NOTE — PROGRESS NOTES
73 y.o. female who presents for evaluation.      No current gi complaints.  She had to have the colo rescheduled to 8/24 due to her stents last fall    Saw BRENDAN Harris/Jayme for stones last fall and had follow up CT a few weeks ago and noted to have bilat stones. Of note, CT also showed gb sludge, early fatty liver, pleural plaques and possible early interstitial lung disease.  Her father had asbestos exposure in the shipyard and prior xrays and CT had shown plaques but no fibrosis.  She is requesting official pulm evaluation at his point    No cp, sob, pnd, edema.   Had appt with Dr Mckay  last month and no new changes.  She is undergoing cardiac rehab    Continues to see vascular and no claudication issues     She had laset eye surgery a few months ago which was successful    Dr Scott is managing the DM. He now has her on amaryl as well.  Admits that the diet is off, drinks a lot of sodas and eats sweets.  Unable to lose weight.  Denies polyuria, polydipsia, nocturia, vision change.       10/19/2023 1/17/2024 1/22/2024 4/4/2024 4/19/2024 5/13/2024   Vitals         Weight - Scale 170 lb  169 lb  173 lb  177 lb  177 lb  178 lb 12.8 oz       5/20/2024 5/21/2024   Vitals     Weight - Scale 179 lb  179 lb      Lastly, requesting diag mammo for right breast pain to be done at [facility].  Last mammo there 10/.23 was neg    LAST MEDICARE WELLNESS EXAM: 12/29/15, 6/27/17, 7/16/18, 9/19/19, 9/14/20, 9/9/21, 9/20/22, 1/22/24    Past Medical History:   Diagnosis Date    Allergic rhinitis     Asbestos exposure     father; pleural plaques on xray 2018    CAD (coronary artery disease)     Dr Emery; cath (8/23) occ mid-RCA, 30% calc LAD, 80-85% prox DMi, 90-95% dist Cx, 99% OM2 which was ptca and stented; staged stent (9/23) residual 70% OM2 stented, 85% DMi stented    Carotid artery disease (HCC) 2017    Dr rFeeman; BRENDAN Bearden    CKD (chronic kidney disease)     DM (diabetes mellitus) (HCC)     Dr Scott; microalbumin 8/23

## 2024-05-20 ENCOUNTER — HOSPITAL ENCOUNTER (OUTPATIENT)
Facility: HOSPITAL | Age: 74
Setting detail: RECURRING SERIES
Discharge: HOME | End: 2024-05-23
Payer: MEDICARE

## 2024-05-20 VITALS — WEIGHT: 179 LBS | BODY MASS INDEX: 31.71 KG/M2

## 2024-05-20 PROCEDURE — 93798 PHYS/QHP OP CAR RHAB W/ECG: CPT

## 2024-05-20 ASSESSMENT — EXERCISE STRESS TEST
PEAK_METS: 2.7
PEAK_RPE: 11
PEAK_HR: 99

## 2024-05-21 ENCOUNTER — OFFICE VISIT (OUTPATIENT)
Age: 74
End: 2024-05-21
Payer: MEDICARE

## 2024-05-21 VITALS
DIASTOLIC BLOOD PRESSURE: 58 MMHG | WEIGHT: 179 LBS | HEIGHT: 63 IN | BODY MASS INDEX: 31.71 KG/M2 | SYSTOLIC BLOOD PRESSURE: 114 MMHG | RESPIRATION RATE: 16 BRPM | HEART RATE: 61 BPM | TEMPERATURE: 98.2 F | OXYGEN SATURATION: 98 %

## 2024-05-21 DIAGNOSIS — N64.4 BREAST PAIN: Primary | ICD-10-CM

## 2024-05-21 DIAGNOSIS — N20.0 NEPHROLITHIASIS: ICD-10-CM

## 2024-05-21 DIAGNOSIS — E66.3 OVERWEIGHT WITH BODY MASS INDEX (BMI) OF 25 TO 25.9 IN ADULT: ICD-10-CM

## 2024-05-21 DIAGNOSIS — I25.10 CORONARY ARTERY DISEASE INVOLVING NATIVE CORONARY ARTERY OF NATIVE HEART WITHOUT ANGINA PECTORIS: ICD-10-CM

## 2024-05-21 DIAGNOSIS — E78.5 DYSLIPIDEMIA: ICD-10-CM

## 2024-05-21 DIAGNOSIS — E11.21 TYPE 2 DIABETES WITH NEPHROPATHY (HCC): ICD-10-CM

## 2024-05-21 DIAGNOSIS — N18.31 STAGE 3A CHRONIC KIDNEY DISEASE (HCC): ICD-10-CM

## 2024-05-21 DIAGNOSIS — I10 ESSENTIAL HYPERTENSION: ICD-10-CM

## 2024-05-21 DIAGNOSIS — J84.10 PULMONARY FIBROSIS (HCC): ICD-10-CM

## 2024-05-21 PROCEDURE — 3046F HEMOGLOBIN A1C LEVEL >9.0%: CPT | Performed by: INTERNAL MEDICINE

## 2024-05-21 PROCEDURE — 1090F PRES/ABSN URINE INCON ASSESS: CPT | Performed by: INTERNAL MEDICINE

## 2024-05-21 PROCEDURE — 3074F SYST BP LT 130 MM HG: CPT | Performed by: INTERNAL MEDICINE

## 2024-05-21 PROCEDURE — 2022F DILAT RTA XM EVC RTNOPTHY: CPT | Performed by: INTERNAL MEDICINE

## 2024-05-21 PROCEDURE — G8399 PT W/DXA RESULTS DOCUMENT: HCPCS | Performed by: INTERNAL MEDICINE

## 2024-05-21 PROCEDURE — 1123F ACP DISCUSS/DSCN MKR DOCD: CPT | Performed by: INTERNAL MEDICINE

## 2024-05-21 PROCEDURE — G8417 CALC BMI ABV UP PARAM F/U: HCPCS | Performed by: INTERNAL MEDICINE

## 2024-05-21 PROCEDURE — 1036F TOBACCO NON-USER: CPT | Performed by: INTERNAL MEDICINE

## 2024-05-21 PROCEDURE — G2211 COMPLEX E/M VISIT ADD ON: HCPCS | Performed by: INTERNAL MEDICINE

## 2024-05-21 PROCEDURE — G8427 DOCREV CUR MEDS BY ELIG CLIN: HCPCS | Performed by: INTERNAL MEDICINE

## 2024-05-21 PROCEDURE — 99214 OFFICE O/P EST MOD 30 MIN: CPT | Performed by: INTERNAL MEDICINE

## 2024-05-21 PROCEDURE — 3078F DIAST BP <80 MM HG: CPT | Performed by: INTERNAL MEDICINE

## 2024-05-21 PROCEDURE — 3017F COLORECTAL CA SCREEN DOC REV: CPT | Performed by: INTERNAL MEDICINE

## 2024-05-21 RX ORDER — GLIMEPIRIDE 2 MG/1
2 TABLET ORAL EVERY MORNING
COMMUNITY
Start: 2024-05-06

## 2024-05-21 RX ORDER — BRIMONIDINE TARTRATE, TIMOLOL MALEATE 2; 5 MG/ML; MG/ML
1 SOLUTION/ DROPS OPHTHALMIC EVERY MORNING
COMMUNITY
Start: 2024-04-11

## 2024-05-21 RX ORDER — NETARSUDIL AND LATANOPROST OPHTHALMIC SOLUTION, 0.02%/0.005% .2; .05 MG/ML; MG/ML
1 SOLUTION/ DROPS OPHTHALMIC; TOPICAL EVERY EVENING
COMMUNITY
Start: 2024-05-09

## 2024-05-21 NOTE — PROGRESS NOTES
Cathie Walden presents today for   Chief Complaint   Patient presents with    Follow-up     3 month    Hypertension    Diabetes     Patient reports pain in right breast, last mammogram 10/2023    \"Have you been to the ER, urgent care clinic since your last visit?  Hospitalized since your last visit?\"    NO    “Have you seen or consulted any other health care providers outside of [facility] since your last visit?”    NO    “Have you had a colorectal cancer screening such as a colonoscopy/FIT/Cologuard?    NO; rescheduled to August    Date of last Colonoscopy: 12/2/2013  No cologuard on file  No FIT/FOBT on file   No flexible sigmoidoscopy on file

## 2024-05-22 ENCOUNTER — TELEPHONE (OUTPATIENT)
Age: 74
End: 2024-05-22

## 2024-05-22 ENCOUNTER — HOSPITAL ENCOUNTER (OUTPATIENT)
Facility: HOSPITAL | Age: 74
Setting detail: RECURRING SERIES
Discharge: HOME | End: 2024-05-25
Payer: MEDICARE

## 2024-05-22 VITALS — BODY MASS INDEX: 31.53 KG/M2 | WEIGHT: 178 LBS

## 2024-05-22 PROCEDURE — 93798 PHYS/QHP OP CAR RHAB W/ECG: CPT

## 2024-05-22 ASSESSMENT — EXERCISE STRESS TEST
PEAK_METS: 2.6
PEAK_RPE: 13
PEAK_HR: 95

## 2024-05-22 NOTE — TELEPHONE ENCOUNTER
----- Message from Tom Diez MD sent at 5/22/2024  6:23 AM EDT -----  Pls send mammo/us order to [facility]

## 2024-05-24 ENCOUNTER — HOSPITAL ENCOUNTER (OUTPATIENT)
Facility: HOSPITAL | Age: 74
Setting detail: RECURRING SERIES
Discharge: HOME | End: 2024-05-27
Payer: MEDICARE

## 2024-05-24 VITALS — WEIGHT: 179 LBS | BODY MASS INDEX: 31.71 KG/M2

## 2024-05-24 PROCEDURE — 93798 PHYS/QHP OP CAR RHAB W/ECG: CPT

## 2024-05-24 ASSESSMENT — EXERCISE STRESS TEST
PEAK_METS: 2.5
PEAK_RPE: 15
PEAK_HR: 97

## 2024-05-29 ENCOUNTER — HOSPITAL ENCOUNTER (OUTPATIENT)
Facility: HOSPITAL | Age: 74
Setting detail: RECURRING SERIES
Discharge: HOME | End: 2024-06-01
Payer: MEDICARE

## 2024-05-29 VITALS — WEIGHT: 178 LBS | BODY MASS INDEX: 31.53 KG/M2

## 2024-05-29 PROCEDURE — 93798 PHYS/QHP OP CAR RHAB W/ECG: CPT

## 2024-05-29 ASSESSMENT — EXERCISE STRESS TEST
PEAK_RPE: 15
PEAK_HR: 99
PEAK_METS: 2.5

## 2024-05-31 ENCOUNTER — HOSPITAL ENCOUNTER (OUTPATIENT)
Facility: HOSPITAL | Age: 74
Setting detail: RECURRING SERIES
End: 2024-05-31
Payer: MEDICARE

## 2024-05-31 VITALS — WEIGHT: 178 LBS | BODY MASS INDEX: 31.53 KG/M2

## 2024-05-31 PROCEDURE — 93798 PHYS/QHP OP CAR RHAB W/ECG: CPT

## 2024-05-31 ASSESSMENT — EXERCISE STRESS TEST
PEAK_HR: 103
PEAK_METS: 2.7
PEAK_RPE: 17

## 2024-06-03 ENCOUNTER — HOSPITAL ENCOUNTER (OUTPATIENT)
Facility: HOSPITAL | Age: 74
Setting detail: RECURRING SERIES
Discharge: HOME | End: 2024-06-06
Payer: MEDICARE

## 2024-06-03 VITALS — BODY MASS INDEX: 31.71 KG/M2 | WEIGHT: 179 LBS

## 2024-06-03 LAB — GLUCOSE BLD STRIP.AUTO-MCNC: 154 MG/DL (ref 70–110)

## 2024-06-03 PROCEDURE — 93798 PHYS/QHP OP CAR RHAB W/ECG: CPT

## 2024-06-03 PROCEDURE — 82962 GLUCOSE BLOOD TEST: CPT

## 2024-06-03 RX ORDER — CARVEDILOL 25 MG/1
25 TABLET ORAL 2 TIMES DAILY WITH MEALS
Qty: 180 TABLET | Refills: 3 | Status: SHIPPED | OUTPATIENT
Start: 2024-06-03

## 2024-06-03 ASSESSMENT — EXERCISE STRESS TEST
PEAK_METS: 2.7
PEAK_HR: 89
PEAK_RPE: 12

## 2024-06-05 ENCOUNTER — HOSPITAL ENCOUNTER (OUTPATIENT)
Facility: HOSPITAL | Age: 74
Setting detail: RECURRING SERIES
Discharge: HOME | End: 2024-06-08
Payer: MEDICARE

## 2024-06-05 VITALS — WEIGHT: 179 LBS | BODY MASS INDEX: 31.71 KG/M2

## 2024-06-05 PROCEDURE — 93798 PHYS/QHP OP CAR RHAB W/ECG: CPT

## 2024-06-05 ASSESSMENT — EXERCISE STRESS TEST
PEAK_METS: 2.7
PEAK_HR: 85
PEAK_RPE: 14

## 2024-06-07 ENCOUNTER — HOSPITAL ENCOUNTER (OUTPATIENT)
Facility: HOSPITAL | Age: 74
Setting detail: RECURRING SERIES
Discharge: HOME | End: 2024-06-10
Payer: MEDICARE

## 2024-06-07 VITALS — WEIGHT: 180 LBS | BODY MASS INDEX: 31.89 KG/M2

## 2024-06-07 PROCEDURE — 93798 PHYS/QHP OP CAR RHAB W/ECG: CPT

## 2024-06-07 ASSESSMENT — EXERCISE STRESS TEST
PEAK_METS: 2.7
PEAK_HR: 91
PEAK_RPE: 15

## 2024-06-10 ENCOUNTER — HOSPITAL ENCOUNTER (OUTPATIENT)
Facility: HOSPITAL | Age: 74
Setting detail: RECURRING SERIES
Discharge: HOME | End: 2024-06-13
Payer: MEDICARE

## 2024-06-10 VITALS — BODY MASS INDEX: 31.71 KG/M2 | WEIGHT: 179 LBS

## 2024-06-10 PROCEDURE — 93798 PHYS/QHP OP CAR RHAB W/ECG: CPT

## 2024-06-10 ASSESSMENT — PATIENT HEALTH QUESTIONNAIRE - PHQ9
2. FEELING DOWN, DEPRESSED OR HOPELESS: NOT AT ALL
SUM OF ALL RESPONSES TO PHQ9 QUESTIONS 1 & 2: 0
6. FEELING BAD ABOUT YOURSELF - OR THAT YOU ARE A FAILURE OR HAVE LET YOURSELF OR YOUR FAMILY DOWN: NOT AT ALL
4. FEELING TIRED OR HAVING LITTLE ENERGY: NOT AT ALL
9. THOUGHTS THAT YOU WOULD BE BETTER OFF DEAD, OR OF HURTING YOURSELF: NOT AT ALL
5. POOR APPETITE OR OVEREATING: NOT AT ALL
SUM OF ALL RESPONSES TO PHQ QUESTIONS 1-9: 0
1. LITTLE INTEREST OR PLEASURE IN DOING THINGS: NOT AT ALL
SUM OF ALL RESPONSES TO PHQ QUESTIONS 1-9: 0
10. IF YOU CHECKED OFF ANY PROBLEMS, HOW DIFFICULT HAVE THESE PROBLEMS MADE IT FOR YOU TO DO YOUR WORK, TAKE CARE OF THINGS AT HOME, OR GET ALONG WITH OTHER PEOPLE: NOT DIFFICULT AT ALL
8. MOVING OR SPEAKING SO SLOWLY THAT OTHER PEOPLE COULD HAVE NOTICED. OR THE OPPOSITE, BEING SO FIGETY OR RESTLESS THAT YOU HAVE BEEN MOVING AROUND A LOT MORE THAN USUAL: NOT AT ALL
3. TROUBLE FALLING OR STAYING ASLEEP: NOT AT ALL
7. TROUBLE CONCENTRATING ON THINGS, SUCH AS READING THE NEWSPAPER OR WATCHING TELEVISION: NOT AT ALL

## 2024-06-10 ASSESSMENT — EXERCISE STRESS TEST
PEAK_BP: 132/71
PEAK_HR: 93
PEAK_METS: 2.7
PEAK_RPE: 15

## 2024-06-10 ASSESSMENT — LIFESTYLE VARIABLES
ALCOHOL_USE: SPECIAL
ALCOHOL_AMOUNT: 1

## 2024-06-10 ASSESSMENT — EJECTION FRACTION: EF_VALUE: 52

## 2024-06-10 NOTE — PROGRESS NOTES
CARDIAC REHAB ITP REASSESSMENT FOR REVIEW AND SIGNATURE    Patient name: Cathie Walden : 1950     Visits from Start of Care: 10/36                                   Reporting Period: 24 - 6/10/24    Subjective Reports: Patient is progressing well.     Goals Comments   1. Exercise Goal(s): Increase maximum exercise METs from 2.6 to 4.6  by end of program.   [] met                  [] not met  [x] progressing  Patient will be educated on importance of increasing resistance and duration in program to gain cardiovascular benefit.   2. Nutrition Goal(s): Adhere to a heart healthy diet by end of program. [] met                  [] not met  [x] progressing Patient will be educated on the importance of following a Heart Healthy Diet and monitoring blood glucose   3. Psychosocial Goal(s): No intervention indicated by end of program. [] met                  [] not met  [x] progressing Participation in cardiac rehab exercise expected to continue to improve patient psychosocial status and cardiovascular health benefit   4. Patient Stated Goal: \"To be able to reach the maximal benefits without getting tired in my legs.\"   by end of program [] met                  [] not met  [x] progressing Patient to be educated on cardiovascular equipment as well as when/how to increase resistance per ACSM guidelines        Outcomes Assessment Initial 30 day 60 day 90 day 120 day D/C   Duration    6 45       MET    2.6 2.7       RPE  11 15       Self-report Home Exercise   Min/per week    90 min/week 105 min/week         Key functional changes: Patient METs increased from 2.6 to 2.7 . Patient exercise tolerance increased from 6 minutes to 45 minutes    Problems/ barriers to goal attainment: None noted/ None reported.      Assessment / Recommendations: Continue with rehab 3 times per week.       Exercise Log    Rehab Common Questions  Any Problems or Changes Since Your Last Visit : Denies  Any Symptoms While Exercising:

## 2024-06-12 ENCOUNTER — HOSPITAL ENCOUNTER (OUTPATIENT)
Facility: HOSPITAL | Age: 74
Setting detail: RECURRING SERIES
Discharge: HOME | End: 2024-06-15
Payer: MEDICARE

## 2024-06-12 VITALS — BODY MASS INDEX: 31.35 KG/M2 | WEIGHT: 177 LBS

## 2024-06-12 PROCEDURE — 93798 PHYS/QHP OP CAR RHAB W/ECG: CPT

## 2024-06-12 ASSESSMENT — EXERCISE STRESS TEST
PEAK_HR: 102
PEAK_RPE: 13
PEAK_METS: 2.7

## 2024-06-14 ENCOUNTER — HOSPITAL ENCOUNTER (OUTPATIENT)
Facility: HOSPITAL | Age: 74
Setting detail: RECURRING SERIES
Discharge: HOME | End: 2024-06-17
Payer: MEDICARE

## 2024-06-14 VITALS — WEIGHT: 177 LBS | BODY MASS INDEX: 31.35 KG/M2

## 2024-06-14 PROCEDURE — 93798 PHYS/QHP OP CAR RHAB W/ECG: CPT

## 2024-06-14 ASSESSMENT — EXERCISE STRESS TEST
PEAK_RPE: 15
PEAK_METS: 2.7
PEAK_HR: 95

## 2024-06-17 ENCOUNTER — HOSPITAL ENCOUNTER (OUTPATIENT)
Facility: HOSPITAL | Age: 74
Setting detail: RECURRING SERIES
Discharge: HOME | End: 2024-06-20
Payer: MEDICARE

## 2024-06-17 VITALS — BODY MASS INDEX: 31.71 KG/M2 | WEIGHT: 179 LBS

## 2024-06-17 PROCEDURE — 93798 PHYS/QHP OP CAR RHAB W/ECG: CPT

## 2024-06-17 ASSESSMENT — EXERCISE STRESS TEST
PEAK_RPE: 15
PEAK_METS: 2.8
PEAK_HR: 97

## 2024-06-19 ENCOUNTER — HOSPITAL ENCOUNTER (OUTPATIENT)
Facility: HOSPITAL | Age: 74
Setting detail: RECURRING SERIES
Discharge: HOME | End: 2024-06-22
Payer: MEDICARE

## 2024-06-19 VITALS — WEIGHT: 176 LBS | BODY MASS INDEX: 31.18 KG/M2

## 2024-06-19 PROCEDURE — 93798 PHYS/QHP OP CAR RHAB W/ECG: CPT

## 2024-06-19 ASSESSMENT — EXERCISE STRESS TEST
PEAK_HR: 94
PEAK_METS: 0
PEAK_RPE: 15

## 2024-06-21 ENCOUNTER — HOSPITAL ENCOUNTER (OUTPATIENT)
Facility: HOSPITAL | Age: 74
Setting detail: RECURRING SERIES
Discharge: HOME | End: 2024-06-24
Payer: MEDICARE

## 2024-06-21 VITALS — WEIGHT: 178 LBS | BODY MASS INDEX: 31.53 KG/M2

## 2024-06-21 PROCEDURE — 93798 PHYS/QHP OP CAR RHAB W/ECG: CPT

## 2024-06-21 ASSESSMENT — EXERCISE STRESS TEST
PEAK_METS: 2.7
PEAK_RPE: 15
PEAK_HR: 92

## 2024-06-24 ENCOUNTER — HOSPITAL ENCOUNTER (OUTPATIENT)
Facility: HOSPITAL | Age: 74
Setting detail: RECURRING SERIES
Discharge: HOME | End: 2024-06-27
Payer: MEDICARE

## 2024-06-24 VITALS — BODY MASS INDEX: 31.35 KG/M2 | WEIGHT: 177 LBS

## 2024-06-24 PROCEDURE — 93798 PHYS/QHP OP CAR RHAB W/ECG: CPT

## 2024-06-24 ASSESSMENT — EXERCISE STRESS TEST
PEAK_RPE: 15
PEAK_HR: 97
PEAK_METS: 2.6

## 2024-06-26 ENCOUNTER — HOSPITAL ENCOUNTER (OUTPATIENT)
Facility: HOSPITAL | Age: 74
Setting detail: RECURRING SERIES
Discharge: HOME | End: 2024-06-29
Payer: MEDICARE

## 2024-06-26 VITALS — BODY MASS INDEX: 31.18 KG/M2 | WEIGHT: 176 LBS

## 2024-06-26 PROCEDURE — 93798 PHYS/QHP OP CAR RHAB W/ECG: CPT

## 2024-06-26 ASSESSMENT — EXERCISE STRESS TEST
PEAK_HR: 99
PEAK_RPE: 14
PEAK_METS: 2.6

## 2024-06-28 ENCOUNTER — HOSPITAL ENCOUNTER (OUTPATIENT)
Facility: HOSPITAL | Age: 74
Setting detail: RECURRING SERIES
Discharge: HOME | End: 2024-07-01
Payer: MEDICARE

## 2024-06-28 VITALS — WEIGHT: 178 LBS | BODY MASS INDEX: 31.53 KG/M2

## 2024-06-28 PROCEDURE — 93798 PHYS/QHP OP CAR RHAB W/ECG: CPT

## 2024-06-28 ASSESSMENT — EXERCISE STRESS TEST
PEAK_RPE: 15
PEAK_METS: 2.5
PEAK_HR: 95

## 2024-07-01 ENCOUNTER — HOSPITAL ENCOUNTER (OUTPATIENT)
Facility: HOSPITAL | Age: 74
Setting detail: RECURRING SERIES
Discharge: HOME | End: 2024-07-04
Payer: MEDICARE

## 2024-07-01 VITALS — BODY MASS INDEX: 31.71 KG/M2 | WEIGHT: 179 LBS

## 2024-07-01 PROCEDURE — 93798 PHYS/QHP OP CAR RHAB W/ECG: CPT

## 2024-07-01 ASSESSMENT — EXERCISE STRESS TEST
PEAK_HR: 87
PEAK_RPE: 14
PEAK_METS: 2.6

## 2024-07-03 ENCOUNTER — HOSPITAL ENCOUNTER (OUTPATIENT)
Facility: HOSPITAL | Age: 74
Setting detail: RECURRING SERIES
Discharge: HOME | End: 2024-07-06
Payer: MEDICARE

## 2024-07-03 VITALS — BODY MASS INDEX: 31.71 KG/M2 | WEIGHT: 179 LBS

## 2024-07-03 PROCEDURE — 93798 PHYS/QHP OP CAR RHAB W/ECG: CPT

## 2024-07-03 ASSESSMENT — EXERCISE STRESS TEST
PEAK_HR: 95
PEAK_METS: 2.6
PEAK_RPE: 14

## 2024-07-05 ENCOUNTER — HOSPITAL ENCOUNTER (OUTPATIENT)
Facility: HOSPITAL | Age: 74
Setting detail: RECURRING SERIES
Discharge: HOME | End: 2024-07-08
Payer: MEDICARE

## 2024-07-05 VITALS — BODY MASS INDEX: 31.71 KG/M2 | WEIGHT: 179 LBS

## 2024-07-05 PROCEDURE — 93798 PHYS/QHP OP CAR RHAB W/ECG: CPT

## 2024-07-05 ASSESSMENT — EXERCISE STRESS TEST
PEAK_METS: 2.7
PEAK_HR: 95
PEAK_RPE: 14

## 2024-07-08 ENCOUNTER — TELEPHONE (OUTPATIENT)
Facility: CLINIC | Age: 74
End: 2024-07-08

## 2024-07-08 ENCOUNTER — HOSPITAL ENCOUNTER (OUTPATIENT)
Facility: HOSPITAL | Age: 74
Setting detail: RECURRING SERIES
Discharge: HOME | End: 2024-07-11
Payer: MEDICARE

## 2024-07-08 VITALS — WEIGHT: 177 LBS | BODY MASS INDEX: 31.35 KG/M2

## 2024-07-08 DIAGNOSIS — R92.8 ABNORMAL MAMMOGRAM: Primary | ICD-10-CM

## 2024-07-08 PROCEDURE — 93798 PHYS/QHP OP CAR RHAB W/ECG: CPT

## 2024-07-08 ASSESSMENT — PATIENT HEALTH QUESTIONNAIRE - PHQ9
4. FEELING TIRED OR HAVING LITTLE ENERGY: NOT AT ALL
SUM OF ALL RESPONSES TO PHQ9 QUESTIONS 1 & 2: 0
2. FEELING DOWN, DEPRESSED OR HOPELESS: NOT AT ALL
SUM OF ALL RESPONSES TO PHQ QUESTIONS 1-9: 0
SUM OF ALL RESPONSES TO PHQ QUESTIONS 1-9: 0
5. POOR APPETITE OR OVEREATING: NOT AT ALL
10. IF YOU CHECKED OFF ANY PROBLEMS, HOW DIFFICULT HAVE THESE PROBLEMS MADE IT FOR YOU TO DO YOUR WORK, TAKE CARE OF THINGS AT HOME, OR GET ALONG WITH OTHER PEOPLE: NOT DIFFICULT AT ALL
8. MOVING OR SPEAKING SO SLOWLY THAT OTHER PEOPLE COULD HAVE NOTICED. OR THE OPPOSITE, BEING SO FIGETY OR RESTLESS THAT YOU HAVE BEEN MOVING AROUND A LOT MORE THAN USUAL: NOT AT ALL
SUM OF ALL RESPONSES TO PHQ QUESTIONS 1-9: 0
SUM OF ALL RESPONSES TO PHQ QUESTIONS 1-9: 0
9. THOUGHTS THAT YOU WOULD BE BETTER OFF DEAD, OR OF HURTING YOURSELF: NOT AT ALL
7. TROUBLE CONCENTRATING ON THINGS, SUCH AS READING THE NEWSPAPER OR WATCHING TELEVISION: NOT AT ALL
3. TROUBLE FALLING OR STAYING ASLEEP: NOT AT ALL
1. LITTLE INTEREST OR PLEASURE IN DOING THINGS: NOT AT ALL
6. FEELING BAD ABOUT YOURSELF - OR THAT YOU ARE A FAILURE OR HAVE LET YOURSELF OR YOUR FAMILY DOWN: NOT AT ALL

## 2024-07-08 ASSESSMENT — EXERCISE STRESS TEST
PEAK_RPE: 15
PEAK_BP: 124/70
PEAK_HR: 91
PEAK_METS: 2.7

## 2024-07-08 ASSESSMENT — LIFESTYLE VARIABLES
ALCOHOL_USE: SPECIAL
ALCOHOL_AMOUNT: 1

## 2024-07-08 ASSESSMENT — EJECTION FRACTION: EF_VALUE: 52

## 2024-07-08 NOTE — TELEPHONE ENCOUNTER
Ade herrera [facility] called and patient is scheduled for mammogram tomorrow.  She needs the order changed to bilateral, patient is due.  Please fax to [phone].  Ade's phone number is [phone], if any questions.

## 2024-07-08 NOTE — PROGRESS NOTES
calorie/portion controlled diet   Nutrition Target Goals   Target Goals HbA1C less than 7 percent for those with diabetes;Waist less than 40 inches males, 35 for females;Weight loss of 5-10%   Education   Learning Barrier Ready to learn   Education Intervention   Education Schedule Given Yes   Patient Education    Education CAD;Risk factors;Med compliance;Cardiac A&P;Signs/Symptoms of angina   Hypertension Yes   Hypertension Controlled Yes   Is BP WDL No   Med(s) Change No   BP Meds Olmesartan; carvedilol; amlodipine   ACE/ARB Prescribed Yes   ACE/ARB Adherent Yes   ASA Prescribed Yes   ASA Adherent Yes   Antiplatelet Prescribed Yes   Antiplatelet Adherent Yes   BB Prescribed Yes   BB Adherent Yes   Statins Prescribed Yes   Statins Adherent Yes   Statin Intensity High   Education Target Goals   Target Goals Medication compliance;Risk factors;Understand target guidelines for lipids;Understand target guidelines for B/P   Staff Treatment Goals   Goals Exercise;Nutrition   Exercise Goal Increase strength and endurance   Exercise Goal Status Progressing   Nutrition Goal Adhere to a heart healthy diet and lower A1C   Nutrition Goal Status Progressing       Aaron Aquino 7/8/2024 11:44 AM

## 2024-07-09 RX ORDER — CLOPIDOGREL BISULFATE 75 MG/1
TABLET ORAL
Qty: 90 TABLET | Refills: 3 | Status: SHIPPED | OUTPATIENT
Start: 2024-07-09

## 2024-07-10 ENCOUNTER — HOSPITAL ENCOUNTER (OUTPATIENT)
Facility: HOSPITAL | Age: 74
Setting detail: RECURRING SERIES
Discharge: HOME | End: 2024-07-13
Payer: MEDICARE

## 2024-07-10 VITALS — WEIGHT: 178 LBS | BODY MASS INDEX: 31.53 KG/M2

## 2024-07-10 PROCEDURE — 93798 PHYS/QHP OP CAR RHAB W/ECG: CPT

## 2024-07-10 ASSESSMENT — EXERCISE STRESS TEST
PEAK_HR: 96
PEAK_RPE: 15
PEAK_METS: 2.7

## 2024-07-12 ENCOUNTER — HOSPITAL ENCOUNTER (OUTPATIENT)
Facility: HOSPITAL | Age: 74
Setting detail: RECURRING SERIES
Discharge: HOME | End: 2024-07-15
Payer: MEDICARE

## 2024-07-12 VITALS — WEIGHT: 177 LBS | BODY MASS INDEX: 31.35 KG/M2

## 2024-07-12 PROCEDURE — 93798 PHYS/QHP OP CAR RHAB W/ECG: CPT

## 2024-07-12 ASSESSMENT — EXERCISE STRESS TEST
PEAK_RPE: 14
PEAK_HR: 82
PEAK_METS: 2.7

## 2024-07-15 ENCOUNTER — HOSPITAL ENCOUNTER (OUTPATIENT)
Facility: HOSPITAL | Age: 74
Setting detail: RECURRING SERIES
Discharge: HOME | End: 2024-07-18
Payer: MEDICARE

## 2024-07-15 VITALS — BODY MASS INDEX: 31.53 KG/M2 | WEIGHT: 178 LBS

## 2024-07-15 PROCEDURE — 93798 PHYS/QHP OP CAR RHAB W/ECG: CPT

## 2024-07-15 ASSESSMENT — EXERCISE STRESS TEST
PEAK_RPE: 15
PEAK_HR: 105

## 2024-07-17 ENCOUNTER — HOSPITAL ENCOUNTER (OUTPATIENT)
Facility: HOSPITAL | Age: 74
Setting detail: RECURRING SERIES
Discharge: HOME | End: 2024-07-20
Payer: MEDICARE

## 2024-07-17 VITALS — BODY MASS INDEX: 31.35 KG/M2 | WEIGHT: 177 LBS

## 2024-07-17 PROCEDURE — 93798 PHYS/QHP OP CAR RHAB W/ECG: CPT

## 2024-07-17 ASSESSMENT — EXERCISE STRESS TEST
PEAK_RPE: 15
PEAK_HR: 93
PEAK_METS: 2.7

## 2024-07-19 ENCOUNTER — HOSPITAL ENCOUNTER (OUTPATIENT)
Facility: HOSPITAL | Age: 74
Setting detail: RECURRING SERIES
Discharge: HOME | End: 2024-07-22
Payer: MEDICARE

## 2024-07-19 VITALS — BODY MASS INDEX: 31.18 KG/M2 | WEIGHT: 176 LBS

## 2024-07-19 PROCEDURE — 93798 PHYS/QHP OP CAR RHAB W/ECG: CPT

## 2024-07-22 ENCOUNTER — HOSPITAL ENCOUNTER (OUTPATIENT)
Facility: HOSPITAL | Age: 74
Setting detail: RECURRING SERIES
Discharge: HOME | End: 2024-07-25
Payer: MEDICARE

## 2024-07-22 VITALS — WEIGHT: 174 LBS | BODY MASS INDEX: 30.82 KG/M2

## 2024-07-22 PROCEDURE — 93798 PHYS/QHP OP CAR RHAB W/ECG: CPT

## 2024-07-24 ENCOUNTER — HOSPITAL ENCOUNTER (OUTPATIENT)
Facility: HOSPITAL | Age: 74
Setting detail: RECURRING SERIES
Discharge: HOME | End: 2024-07-27
Payer: MEDICARE

## 2024-07-24 VITALS — WEIGHT: 176 LBS | BODY MASS INDEX: 31.18 KG/M2

## 2024-07-24 PROCEDURE — 93798 PHYS/QHP OP CAR RHAB W/ECG: CPT

## 2024-07-26 ENCOUNTER — HOSPITAL ENCOUNTER (OUTPATIENT)
Facility: HOSPITAL | Age: 74
Setting detail: RECURRING SERIES
Discharge: HOME | End: 2024-07-29
Payer: MEDICARE

## 2024-07-26 VITALS — BODY MASS INDEX: 31.35 KG/M2 | WEIGHT: 177 LBS

## 2024-07-26 PROCEDURE — 93798 PHYS/QHP OP CAR RHAB W/ECG: CPT

## 2024-07-26 ASSESSMENT — EXERCISE STRESS TEST
PEAK_HR: 94
PEAK_METS: 3.1
PEAK_RPE: 15

## 2024-07-29 ENCOUNTER — HOSPITAL ENCOUNTER (OUTPATIENT)
Facility: HOSPITAL | Age: 74
Setting detail: RECURRING SERIES
Discharge: HOME | End: 2024-08-01
Payer: MEDICARE

## 2024-07-29 VITALS — WEIGHT: 176 LBS | BODY MASS INDEX: 31.18 KG/M2

## 2024-07-29 PROCEDURE — 93798 PHYS/QHP OP CAR RHAB W/ECG: CPT

## 2024-07-29 ASSESSMENT — EXERCISE STRESS TEST
PEAK_METS: 3.1
PEAK_HR: 91
PEAK_RPE: 14

## 2024-07-31 ENCOUNTER — HOSPITAL ENCOUNTER (OUTPATIENT)
Facility: HOSPITAL | Age: 74
Setting detail: RECURRING SERIES
Discharge: HOME | End: 2024-08-03
Payer: MEDICARE

## 2024-07-31 VITALS — BODY MASS INDEX: 31.35 KG/M2 | WEIGHT: 177 LBS

## 2024-07-31 PROCEDURE — 93798 PHYS/QHP OP CAR RHAB W/ECG: CPT

## 2024-07-31 ASSESSMENT — EXERCISE STRESS TEST
PEAK_HR: 89
PEAK_RPE: 14

## 2024-08-02 ENCOUNTER — HOSPITAL ENCOUNTER (OUTPATIENT)
Facility: HOSPITAL | Age: 74
Setting detail: RECURRING SERIES
Discharge: HOME | End: 2024-08-05

## 2024-08-02 VITALS — BODY MASS INDEX: 31.35 KG/M2 | WEIGHT: 177 LBS

## 2024-08-02 PROCEDURE — 93798 PHYS/QHP OP CAR RHAB W/ECG: CPT

## 2024-08-02 ASSESSMENT — EXERCISE STRESS TEST
PEAK_HR: 89
PEAK_METS: 2.7
PEAK_RPE: 14

## 2024-08-05 ENCOUNTER — HOSPITAL ENCOUNTER (OUTPATIENT)
Facility: HOSPITAL | Age: 74
Setting detail: RECURRING SERIES
Discharge: HOME | End: 2024-08-08

## 2024-08-05 VITALS — BODY MASS INDEX: 31.18 KG/M2 | WEIGHT: 176 LBS

## 2024-08-05 PROCEDURE — 93797 PHYS/QHP OP CAR RHAB WO ECG: CPT

## 2024-08-05 ASSESSMENT — EXERCISE STRESS TEST
PEAK_METS: 2.8
PEAK_HR: 66
PEAK_RPE: 14

## 2024-08-05 NOTE — PROGRESS NOTES
Daily Progress Note- Nonmonitored Exercise Session    Cathie Walden is a 73 y.o. year old female with Peripheral vascular angioplasty status with implants and grafts [Z95.820].    She is a patient in cardiac rehab that is unable to be monitored on Telemetry due to tele box malfunction, pt agreed to non monitored session as she is approaching completion of the program.    During session, patient used the following modalities.       Modality     MET Level   # Minutes:      HR   []   Warm-up          [x]  Arm Ergometer      Rizo:   24  2.8 15 66   [x]  NuStep   Workload:  5  2.4 15 72   []  Treadmill Speed:   Grade:           []  Hallway Walk Feet:           []  Schwinn Bike        []  Stationary Bike Level:           [x]  Recumbent Bike     Level: 1   RPM:  45-50 2.5 15 60   []  Resistance bands     Color   Reps/Sets        []  Free Weights       LBS:   Reps/Sets        Recovery & Monitoring                  5 65     Electronically signed by Aaron Aquino

## 2024-08-07 ENCOUNTER — HOSPITAL ENCOUNTER (OUTPATIENT)
Facility: HOSPITAL | Age: 74
Setting detail: RECURRING SERIES
Discharge: HOME | End: 2024-08-10

## 2024-08-07 ENCOUNTER — APPOINTMENT (OUTPATIENT)
Facility: HOSPITAL | Age: 74
End: 2024-08-07

## 2024-08-07 VITALS — WEIGHT: 174 LBS | BODY MASS INDEX: 30.82 KG/M2

## 2024-08-07 PROCEDURE — 93797 PHYS/QHP OP CAR RHAB WO ECG: CPT

## 2024-08-07 PROCEDURE — 93798 PHYS/QHP OP CAR RHAB W/ECG: CPT

## 2024-08-07 ASSESSMENT — PATIENT HEALTH QUESTIONNAIRE - PHQ9
4. FEELING TIRED OR HAVING LITTLE ENERGY: NOT AT ALL
2. FEELING DOWN, DEPRESSED OR HOPELESS: NOT AT ALL
8. MOVING OR SPEAKING SO SLOWLY THAT OTHER PEOPLE COULD HAVE NOTICED. OR THE OPPOSITE, BEING SO FIGETY OR RESTLESS THAT YOU HAVE BEEN MOVING AROUND A LOT MORE THAN USUAL: NOT AT ALL
10. IF YOU CHECKED OFF ANY PROBLEMS, HOW DIFFICULT HAVE THESE PROBLEMS MADE IT FOR YOU TO DO YOUR WORK, TAKE CARE OF THINGS AT HOME, OR GET ALONG WITH OTHER PEOPLE: NOT DIFFICULT AT ALL
9. THOUGHTS THAT YOU WOULD BE BETTER OFF DEAD, OR OF HURTING YOURSELF: NOT AT ALL
5. POOR APPETITE OR OVEREATING: NOT AT ALL
SUM OF ALL RESPONSES TO PHQ QUESTIONS 1-9: 0
SUM OF ALL RESPONSES TO PHQ QUESTIONS 1-9: 0
3. TROUBLE FALLING OR STAYING ASLEEP: NOT AT ALL
1. LITTLE INTEREST OR PLEASURE IN DOING THINGS: NOT AT ALL
SUM OF ALL RESPONSES TO PHQ9 QUESTIONS 1 & 2: 0
SUM OF ALL RESPONSES TO PHQ QUESTIONS 1-9: 0
6. FEELING BAD ABOUT YOURSELF - OR THAT YOU ARE A FAILURE OR HAVE LET YOURSELF OR YOUR FAMILY DOWN: NOT AT ALL
7. TROUBLE CONCENTRATING ON THINGS, SUCH AS READING THE NEWSPAPER OR WATCHING TELEVISION: NOT AT ALL
SUM OF ALL RESPONSES TO PHQ QUESTIONS 1-9: 0

## 2024-08-07 ASSESSMENT — EXERCISE STRESS TEST
PEAK_BP: 130/82
PEAK_HR: 94
PEAK_HR: 94
PEAK_RPE: 13
PEAK_RPE: 14
PEAK_RPD: 0
PEAK_BP: 136/84
PEAK_METS: 2.89
PEAK_BP: 130/82

## 2024-08-07 ASSESSMENT — LIFESTYLE VARIABLES
ALCOHOL_AMOUNT: 1
ALCOHOL_AMOUNT: 1
ALCOHOL_USE: SPECIAL
ALCOHOL_USE: SPECIAL

## 2024-08-07 ASSESSMENT — EJECTION FRACTION
EF_VALUE: 52
EF_VALUE: 52

## 2024-08-07 ASSESSMENT — NEW YORK HEART ASSOCIATION (NYHA) CLASSIFICATION: NYHA FUNCTIONAL CLASS: CLASS I

## 2024-08-07 NOTE — PROGRESS NOTES
techniques;Environmental triggers;Impact self care behaviors on health;Relaxation techniques;Signs/symptoms of depression;Stress management class   Psychosocial Target Goals   Target Goal(s) Assess presence or absence of depression using a valid screening tool;Demonstrates appropriate interaction with others;Engages in self-care behaviors;Maximizes coping skills;Positive support group   Uses Stress Mgmt Techniques No   Tobacco   Tobacco Use No   Nutrition   Stages of Change Action   Diabetes Yes   HgbA1c 10.7   HgbA1c Date 01/15/24   BG at Home Yes   BG Frequency 2-3x/day   Diabetes Med(s) Metformin, trulicity, Humalog kwikpen   Diabetes Med(s) Change No   BG in Range Yes   Random    Lipids   Date Lipids Drawn 01/15/24   Total 122   HDL 39   LDL 61.2   Triglycerides 109   Lipid Med(s) atorvastatin   Lipid Med Change(s) No   Weight Management   Weight  78.9 kg (174 lb)   Height  1.6 m (5' 3\")   BMI 30.89   Waist Circumference  39\"   Rate Your Plate Total Score 46   Alcohol Special   Type Gin   Amount 1   Nutrition Intervention   Dietitian Consult No   Nurse/Patient Discussion Yes   Nutrition Education   Education Signs/symptoms/treatment of hypo/hyperglycemia;DM & CAD relationship;Low saturated fat diet;Low sodium diet;Limit added sugars;Overall healthy eating pattern that emphasizes vegetables, fruits, wholegrains, healthy proteins and non-tropical oils;Reduced calorie/portion controlled diet   Nutrition Target Goals   Target Goals HbA1C less than 7 percent for those with diabetes;Waist less than 40 inches males, 35 for females;Weight loss of 5-10%   Education   Learning Barrier Ready to learn   Education Intervention   Education Schedule Given Yes   Patient Education    Education CAD;Risk factors;Med compliance;Cardiac A&P;Signs/Symptoms of angina   Hypertension Yes   Hypertension Controlled Yes   Is BP WDL No   Med(s) Change No   BP Meds Olmesartan; carvedilol; amlodipine   ACE/ARB Prescribed Yes   ACE/ARB

## 2024-08-07 NOTE — PROGRESS NOTES
Cardiac Rehab Education Note    Cathie Walden attended Cardiac Medications Education in Cardiac Rehab 8/7/24. Time for questions offered, all questions answered at this time.    Aaron Aquino  10:51 AM 8/7/24

## 2024-08-09 ENCOUNTER — APPOINTMENT (OUTPATIENT)
Facility: HOSPITAL | Age: 74
End: 2024-08-09

## 2024-08-19 ENCOUNTER — APPOINTMENT (OUTPATIENT)
Facility: HOSPITAL | Age: 74
End: 2024-08-19

## 2024-08-21 ENCOUNTER — APPOINTMENT (OUTPATIENT)
Facility: HOSPITAL | Age: 74
End: 2024-08-21

## 2024-08-22 ENCOUNTER — TELEPHONE (OUTPATIENT)
Age: 74
End: 2024-08-22

## 2024-08-22 ENCOUNTER — OFFICE VISIT (OUTPATIENT)
Age: 74
End: 2024-08-22

## 2024-08-22 VITALS
OXYGEN SATURATION: 94 % | DIASTOLIC BLOOD PRESSURE: 80 MMHG | HEIGHT: 63 IN | SYSTOLIC BLOOD PRESSURE: 128 MMHG | WEIGHT: 178 LBS | HEART RATE: 58 BPM | BODY MASS INDEX: 31.54 KG/M2

## 2024-08-22 DIAGNOSIS — Z95.820 STATUS POST ANGIOPLASTY WITH STENT: Primary | ICD-10-CM

## 2024-08-22 DIAGNOSIS — I25.10 CORONARY ARTERY DISEASE, UNSPECIFIED VESSEL OR LESION TYPE, UNSPECIFIED WHETHER ANGINA PRESENT, UNSPECIFIED WHETHER NATIVE OR TRANSPLANTED HEART: ICD-10-CM

## 2024-08-22 ASSESSMENT — PATIENT HEALTH QUESTIONNAIRE - PHQ9
SUM OF ALL RESPONSES TO PHQ9 QUESTIONS 1 & 2: 0
2. FEELING DOWN, DEPRESSED OR HOPELESS: NOT AT ALL
SUM OF ALL RESPONSES TO PHQ QUESTIONS 1-9: 0
1. LITTLE INTEREST OR PLEASURE IN DOING THINGS: NOT AT ALL
SUM OF ALL RESPONSES TO PHQ QUESTIONS 1-9: 0

## 2024-08-22 NOTE — PROGRESS NOTES
Cathie Walden presents today for   Chief Complaint   Patient presents with    Follow-up     4 month f/u     Edema     Left ankle edema at times        Cathie Walden preferred language for health care discussion is english/other.    Is someone accompanying this pt? no    Is the patient using any DME equipment during OV? no    Depression Screening:  Depression: Not at risk (8/22/2024)    PHQ-2     PHQ-2 Score: 0        Learning Assessment:  Who is the primary learner? Patient    What is the preferred language for health care of the primary learner? ENGLISH    How does the primary learner prefer to learn new concepts? DEMONSTRATION    Answered By patient    Relationship to Learner SELF           Pt currently taking Anticoagulant therapy? no    Pt currently taking Antiplatelet therapy ? ASA 81 mg 1x daily and Plavix 75 mg 1x daily      Coordination of Care:  1. Have you been to the ER, urgent care clinic since your last visit? Hospitalized since your last visit? no    2. Have you seen or consulted any other health care providers outside of the [facility] since your last visit? Include any pap smears or colon screening. no

## 2024-08-22 NOTE — PROGRESS NOTES
Cathie Walden    Preop, CAD    HPI    Cathie Walden is a 73 y.o. with carotid disease and h/o TIA, who I met 6/2023 when referred for perioperative risk ratification prior to urological procedure, with abnormal cardiac testing.    You know patient was admitted for urosepsis with ureteral stone and right hydronephrosis at [facility] earlier in May she tells me she currently has a stent that needs to be removed needs to have another kidney stone taken out and then potentially another ureteral stent placed.  She is having hematuria and some flank pain.    She is obviously not been as active but I guess was having some shortness of breath so had a nuclear stress test 4/13/23: EF 52% but she had a moderate severity left ventricular stress perfusion defect in the inferolateral segment that was partially reversible, no TID.    She is now s/p staged PCI and doing well. She has no CV complaints, no more pain at all and really improved functional status with cardiac rehab!    Past Medical History:   Diagnosis Date    Allergic rhinitis     Asbestos exposure     father; pleural plaques on xray 2018    CAD (coronary artery disease)     Dr Emery; cath (8/23) occ mid-RCA, 30% calc LAD, 80-85% prox DMi, 90-95% dist Cx, 99% OM2 which was ptca and stented; staged stent (9/23) residual 70% OM2 stented, 85% DMi stented    Carotid artery disease (HCC) 2017    Dr Freeman; [facility]    CKD (chronic kidney disease)     DM (diabetes mellitus) ([facility])     Dr Scott; microalbumin 8/23    Fatty liver 04/2024    on CT; small stones/sludge gb    GERD (gastroesophageal reflux disease)     H/O cardiovascular stress test     thallium neg ef 70% (3/12); Dr Olmos; NST neg, ef 76% (2018); NST mod risk, ef 50%, inferolat isch, TID 1.12 (6/23)    H/O echocardiogram     conc lvh, ef 60%, DD (3/12); nl lv, ef 68%, conc lvh, no wma, mild dd, no valvular abn, neg bubble study (11/17 - [facility]); nl lv, ef 72%, no wma, gr 1

## 2024-08-22 NOTE — TELEPHONE ENCOUNTER
[facility].   Patient seen today and cleared at her appointment at low to moderate risk. Ok to hold plavix for 5 days per office note.    Dr. Mckay signed clearance form and it was faxed to [phone].

## 2024-08-23 ENCOUNTER — APPOINTMENT (OUTPATIENT)
Facility: HOSPITAL | Age: 74
End: 2024-08-23

## 2024-08-26 ENCOUNTER — HOSPITAL ENCOUNTER (OUTPATIENT)
Facility: HOSPITAL | Age: 74
Setting detail: RECURRING SERIES
Discharge: HOME | End: 2024-08-29

## 2024-08-26 PROCEDURE — 93798 PHYS/QHP OP CAR RHAB W/ECG: CPT

## 2024-08-26 PROCEDURE — 9900000112 HC DAILY CARDIAC MAINTENANCE (RETAIL)

## 2024-08-26 NOTE — PROGRESS NOTES
Cardiac Rehab Phase III    Cathie Walden completed Cardiac Rehab Phase III exercise on 08/26/24. Patient reported no signs/symptoms. No adverse response noted.     Aaron Aquino  10:26 AM 08/26/24

## 2024-08-28 ENCOUNTER — HOSPITAL ENCOUNTER (OUTPATIENT)
Facility: HOSPITAL | Age: 74
Setting detail: RECURRING SERIES
Discharge: HOME | End: 2024-08-31

## 2024-08-28 PROCEDURE — 9900000112 HC DAILY CARDIAC MAINTENANCE (RETAIL)

## 2024-08-28 NOTE — PROGRESS NOTES
Cardiac Rehab Phase III    Cathie Walden completed Cardiac Rehab Phase III exercise on 08/28/24. Patient reported no signs/symptoms. No adverse response noted.     Aaron Aquino  10:24 AM 08/28/24

## 2024-08-30 ENCOUNTER — HOSPITAL ENCOUNTER (OUTPATIENT)
Facility: HOSPITAL | Age: 74
Setting detail: RECURRING SERIES
End: 2024-08-30

## 2024-08-30 PROCEDURE — 9900000112 HC DAILY CARDIAC MAINTENANCE (RETAIL)

## 2024-08-30 NOTE — PROGRESS NOTES
Cardiac Rehab Phase III    Cathie Walden completed Cardiac Rehab Phase III exercise on 08/30/24. Patient reported no signs/symptoms. No adverse response noted.     Aaron Aquino  3:42 PM 08/30/24

## 2024-09-04 ENCOUNTER — HOSPITAL ENCOUNTER (OUTPATIENT)
Facility: HOSPITAL | Age: 74
Setting detail: RECURRING SERIES
Discharge: HOME | End: 2024-09-07

## 2024-09-04 PROCEDURE — 9900000112 HC DAILY CARDIAC MAINTENANCE (RETAIL)

## 2024-09-04 NOTE — PROGRESS NOTES
Cardiac Rehab Phase III    Cathie Walden completed Cardiac Rehab Phase III exercise on 09/04/24. Patient reported no signs/symptoms. No adverse response noted.     Aaron Aquino  11:46 AM 09/04/24

## 2024-09-06 ENCOUNTER — HOSPITAL ENCOUNTER (OUTPATIENT)
Facility: HOSPITAL | Age: 74
Setting detail: RECURRING SERIES
Discharge: HOME | End: 2024-09-09

## 2024-09-06 PROCEDURE — 9900000112 HC DAILY CARDIAC MAINTENANCE (RETAIL)

## 2024-09-09 ENCOUNTER — OFFICE VISIT (OUTPATIENT)
Facility: CLINIC | Age: 74
End: 2024-09-09

## 2024-09-09 ENCOUNTER — HOSPITAL ENCOUNTER (OUTPATIENT)
Facility: HOSPITAL | Age: 74
Setting detail: RECURRING SERIES
Discharge: HOME | End: 2024-09-12

## 2024-09-09 VITALS
BODY MASS INDEX: 31.36 KG/M2 | OXYGEN SATURATION: 99 % | WEIGHT: 177 LBS | DIASTOLIC BLOOD PRESSURE: 67 MMHG | TEMPERATURE: 98.2 F | RESPIRATION RATE: 16 BRPM | SYSTOLIC BLOOD PRESSURE: 132 MMHG | HEART RATE: 55 BPM | HEIGHT: 63 IN

## 2024-09-09 DIAGNOSIS — H26.9 CATARACT OF RIGHT EYE, UNSPECIFIED CATARACT TYPE: ICD-10-CM

## 2024-09-09 DIAGNOSIS — N18.31 STAGE 3A CHRONIC KIDNEY DISEASE (HCC): ICD-10-CM

## 2024-09-09 DIAGNOSIS — I25.10 CORONARY ARTERY DISEASE INVOLVING NATIVE CORONARY ARTERY OF NATIVE HEART WITHOUT ANGINA PECTORIS: ICD-10-CM

## 2024-09-09 DIAGNOSIS — E11.21 TYPE 2 DIABETES WITH NEPHROPATHY (HCC): ICD-10-CM

## 2024-09-09 DIAGNOSIS — Z01.818 PREOP EXAM FOR INTERNAL MEDICINE: ICD-10-CM

## 2024-09-09 DIAGNOSIS — Z12.11 SCREEN FOR COLON CANCER: Primary | ICD-10-CM

## 2024-09-09 PROCEDURE — 9900000112 HC DAILY CARDIAC MAINTENANCE (RETAIL)

## 2024-09-11 ENCOUNTER — HOSPITAL ENCOUNTER (OUTPATIENT)
Facility: HOSPITAL | Age: 74
Setting detail: RECURRING SERIES
Discharge: HOME | End: 2024-09-14

## 2024-09-11 PROCEDURE — 9900000112 HC DAILY CARDIAC MAINTENANCE (RETAIL)

## 2024-09-13 ENCOUNTER — HOSPITAL ENCOUNTER (OUTPATIENT)
Facility: HOSPITAL | Age: 74
Setting detail: RECURRING SERIES
Discharge: HOME | End: 2024-09-16

## 2024-09-16 ENCOUNTER — HOSPITAL ENCOUNTER (OUTPATIENT)
Facility: HOSPITAL | Age: 74
Setting detail: RECURRING SERIES
Discharge: HOME | End: 2024-09-19

## 2024-09-16 PROCEDURE — 9900000112 HC DAILY CARDIAC MAINTENANCE (RETAIL)

## 2024-09-18 ENCOUNTER — HOSPITAL ENCOUNTER (OUTPATIENT)
Facility: HOSPITAL | Age: 74
Setting detail: RECURRING SERIES
Discharge: HOME | End: 2024-09-21

## 2024-09-18 PROCEDURE — 9900000112 HC DAILY CARDIAC MAINTENANCE (RETAIL)

## 2024-09-20 ENCOUNTER — HOSPITAL ENCOUNTER (OUTPATIENT)
Facility: HOSPITAL | Age: 74
Setting detail: RECURRING SERIES
Discharge: HOME | End: 2024-09-23

## 2024-09-20 PROCEDURE — 9900000112 HC DAILY CARDIAC MAINTENANCE (RETAIL)

## 2024-09-23 ENCOUNTER — TELEPHONE (OUTPATIENT)
Facility: HOSPITAL | Age: 74
End: 2024-09-23

## 2024-09-23 ENCOUNTER — APPOINTMENT (OUTPATIENT)
Facility: HOSPITAL | Age: 74
End: 2024-09-23
Payer: MEDICARE

## 2024-09-25 ENCOUNTER — HOSPITAL ENCOUNTER (OUTPATIENT)
Facility: HOSPITAL | Age: 74
Setting detail: RECURRING SERIES
Discharge: HOME | End: 2024-09-28

## 2024-09-25 PROCEDURE — 9900000112 HC DAILY CARDIAC MAINTENANCE (RETAIL)

## 2024-09-27 ENCOUNTER — HOSPITAL ENCOUNTER (OUTPATIENT)
Facility: HOSPITAL | Age: 74
Setting detail: RECURRING SERIES
Discharge: HOME | End: 2024-09-30
Payer: MEDICARE

## 2024-09-27 PROCEDURE — 9900000112 HC DAILY CARDIAC MAINTENANCE (RETAIL)

## 2024-09-27 NOTE — PROGRESS NOTES
Cardiac Rehab Phase III    Cathie Walden completed Cardiac Rehab Phase III exercise on 09/27/24. Patient reported no signs/symptoms. No adverse response noted.     Toya Quezada, KODI-CEP   3:12 PM 09/27/24

## 2024-09-30 ENCOUNTER — HOSPITAL ENCOUNTER (OUTPATIENT)
Facility: HOSPITAL | Age: 74
Discharge: HOME | End: 2024-10-03

## 2024-09-30 PROCEDURE — 9900000112 HC DAILY CARDIAC MAINTENANCE (RETAIL)

## 2024-09-30 NOTE — PROGRESS NOTES
Cardiac Rehab Phase III    Cathie Walden completed Cardiac Rehab Phase III exercise on 09/30/24. Patient reported no signs/symptoms. No adverse response noted.     Aaron Aquino  10:28 AM 09/30/24

## 2024-10-07 ENCOUNTER — TELEPHONE (OUTPATIENT)
Age: 74
End: 2024-10-07

## 2024-10-07 NOTE — TELEPHONE ENCOUNTER
Received a fax from [facility] requesting cardiac clearance for cataract extraction and lens implant in right eye. Procedure date 10-22. Will discuss with provider.

## 2024-10-10 NOTE — TELEPHONE ENCOUNTER
Verbal order and read back per Tabby Mckay, DO  Patient can proceed from a CV stanpdoint.    Clearance form faxed to [phone].

## 2024-11-07 RX ORDER — SEMAGLUTIDE 0.68 MG/ML
INJECTION, SOLUTION SUBCUTANEOUS
COMMUNITY

## 2024-11-07 NOTE — PROGRESS NOTES
Instructions for your procedure at [facility]      Today's Date: 11/7/2024      Patient's Name: Cathie Walden      Procedure Date: 11/13/2024        Please enter the main entrance of the hospital and check-in at the  located in the lobby.      Do NOT eat or drink anything, including candy, gum, or ice chips after midnight prior to your procedure, unless it is part of your prep.  Brush your teeth before coming to the hospital.You may swish with water, but do not swallow.  No smoking/Vaping/E-Cigarettes 24 hours prior to the day of procedure.  No alcohol 24 hours prior to the day of procedure.  No recreational drugs for one week prior to the day of procedure.  Bring Photo ID, Insurance information, and Co-pay if required on day of procedure.  Bring in pertinent legal documents, such as, Medical Power of , DNR, Advance Directive, etc.  Leave all other valuables, including money/purse, at home.  Remove jewelry, including ALL body piercings, nail polish, acrylic nails, and makeup (including mascara); no lotions, powders, deodorant, and/or perfume/cologne/after shave on the skin.  Glasses and dentures may be worn to the hospital.  They must be removed prior to procedure. Please bring case/container for glasses or dentures.  11. Contacts should not be worn on day of procedure.   12. Call the office ([phone]) if you have symptoms of a cold or illness within 24-48 hours prior to your procedure.   13. AN ADULT (relative or friend 18 years or older) MUST DRIVE YOU HOME AFTER YOUR PROCEDURE.   14. Please make arrangements for a responsible adult (18 years or older) to be with you for 24 hours after your procedure.   15. TWO VISITORS will be allowed in the waiting area during your procedure.       Special Instructions:      Bring list of CURRENT medications.  Follow instructions from the office regarding Bowel Prep, Vitamins, Iron, Blood Thinners, Insulin,  and Blood

## 2024-11-12 ENCOUNTER — ANESTHESIA EVENT (OUTPATIENT)
Facility: HOSPITAL | Age: 74
End: 2024-11-12
Payer: MEDICARE

## 2024-11-13 ENCOUNTER — ANESTHESIA (OUTPATIENT)
Facility: HOSPITAL | Age: 74
End: 2024-11-13
Payer: MEDICARE

## 2024-11-13 ENCOUNTER — HOSPITAL ENCOUNTER (OUTPATIENT)
Facility: HOSPITAL | Age: 74
Setting detail: OUTPATIENT SURGERY
Discharge: HOME | End: 2024-11-13
Attending: STUDENT IN AN ORGANIZED HEALTH CARE EDUCATION/TRAINING PROGRAM | Admitting: STUDENT IN AN ORGANIZED HEALTH CARE EDUCATION/TRAINING PROGRAM
Payer: MEDICARE

## 2024-11-13 VITALS
RESPIRATION RATE: 18 BRPM | HEART RATE: 72 BPM | HEIGHT: 63 IN | WEIGHT: 173.3 LBS | TEMPERATURE: 98.2 F | SYSTOLIC BLOOD PRESSURE: 103 MMHG | DIASTOLIC BLOOD PRESSURE: 79 MMHG | OXYGEN SATURATION: 100 % | BODY MASS INDEX: 30.71 KG/M2

## 2024-11-13 LAB
GLUCOSE BLD STRIP.AUTO-MCNC: 221 MG/DL (ref 70–110)
GLUCOSE BLD STRIP.AUTO-MCNC: 271 MG/DL (ref 70–110)

## 2024-11-13 PROCEDURE — 6370000000 HC RX 637 (ALT 250 FOR IP): Performed by: NURSE ANESTHETIST, CERTIFIED REGISTERED

## 2024-11-13 PROCEDURE — 3700000001 HC ADD 15 MINUTES (ANESTHESIA): Performed by: STUDENT IN AN ORGANIZED HEALTH CARE EDUCATION/TRAINING PROGRAM

## 2024-11-13 PROCEDURE — 3700000000 HC ANESTHESIA ATTENDED CARE: Performed by: STUDENT IN AN ORGANIZED HEALTH CARE EDUCATION/TRAINING PROGRAM

## 2024-11-13 PROCEDURE — 82962 GLUCOSE BLOOD TEST: CPT

## 2024-11-13 PROCEDURE — 7100000000 HC PACU RECOVERY - FIRST 15 MIN: Performed by: STUDENT IN AN ORGANIZED HEALTH CARE EDUCATION/TRAINING PROGRAM

## 2024-11-13 PROCEDURE — 3600007512: Performed by: STUDENT IN AN ORGANIZED HEALTH CARE EDUCATION/TRAINING PROGRAM

## 2024-11-13 PROCEDURE — 3600007502: Performed by: STUDENT IN AN ORGANIZED HEALTH CARE EDUCATION/TRAINING PROGRAM

## 2024-11-13 PROCEDURE — 6360000002 HC RX W HCPCS: Performed by: ANESTHESIOLOGY

## 2024-11-13 PROCEDURE — 2580000003 HC RX 258: Performed by: ANESTHESIOLOGY

## 2024-11-13 PROCEDURE — 2580000003 HC RX 258: Performed by: NURSE ANESTHETIST, CERTIFIED REGISTERED

## 2024-11-13 PROCEDURE — 88305 TISSUE EXAM BY PATHOLOGIST: CPT

## 2024-11-13 PROCEDURE — 2709999900 HC NON-CHARGEABLE SUPPLY: Performed by: STUDENT IN AN ORGANIZED HEALTH CARE EDUCATION/TRAINING PROGRAM

## 2024-11-13 PROCEDURE — 2500000003 HC RX 250 WO HCPCS: Performed by: ANESTHESIOLOGY

## 2024-11-13 PROCEDURE — 7100000010 HC PHASE II RECOVERY - FIRST 15 MIN: Performed by: STUDENT IN AN ORGANIZED HEALTH CARE EDUCATION/TRAINING PROGRAM

## 2024-11-13 RX ORDER — LIDOCAINE HYDROCHLORIDE 20 MG/ML
INJECTION, SOLUTION EPIDURAL; INFILTRATION; INTRACAUDAL; PERINEURAL
Status: DISCONTINUED | OUTPATIENT
Start: 2024-11-13 | End: 2024-11-13 | Stop reason: SDUPTHER

## 2024-11-13 RX ORDER — PROPOFOL 10 MG/ML
INJECTION, EMULSION INTRAVENOUS
Status: DISCONTINUED | OUTPATIENT
Start: 2024-11-13 | End: 2024-11-13 | Stop reason: SDUPTHER

## 2024-11-13 RX ORDER — SODIUM CHLORIDE 9 MG/ML
INJECTION, SOLUTION INTRAVENOUS PRN
Status: DISCONTINUED | OUTPATIENT
Start: 2024-11-13 | End: 2024-11-13 | Stop reason: HOSPADM

## 2024-11-13 RX ORDER — DEXTROSE MONOHYDRATE 100 MG/ML
INJECTION, SOLUTION INTRAVENOUS CONTINUOUS PRN
Status: DISCONTINUED | OUTPATIENT
Start: 2024-11-13 | End: 2024-11-13 | Stop reason: HOSPADM

## 2024-11-13 RX ORDER — SODIUM CHLORIDE 0.9 % (FLUSH) 0.9 %
5-40 SYRINGE (ML) INJECTION EVERY 12 HOURS SCHEDULED
Status: DISCONTINUED | OUTPATIENT
Start: 2024-11-13 | End: 2024-11-13 | Stop reason: HOSPADM

## 2024-11-13 RX ORDER — SODIUM CHLORIDE, SODIUM LACTATE, POTASSIUM CHLORIDE, CALCIUM CHLORIDE 600; 310; 30; 20 MG/100ML; MG/100ML; MG/100ML; MG/100ML
INJECTION, SOLUTION INTRAVENOUS CONTINUOUS
Status: DISCONTINUED | OUTPATIENT
Start: 2024-11-13 | End: 2024-11-13 | Stop reason: HOSPADM

## 2024-11-13 RX ORDER — SODIUM CHLORIDE 0.9 % (FLUSH) 0.9 %
5-40 SYRINGE (ML) INJECTION PRN
Status: DISCONTINUED | OUTPATIENT
Start: 2024-11-13 | End: 2024-11-13 | Stop reason: HOSPADM

## 2024-11-13 RX ORDER — INSULIN LISPRO 100 [IU]/ML
0-4 INJECTION, SOLUTION INTRAVENOUS; SUBCUTANEOUS EVERY 6 HOURS SCHEDULED
Status: DISCONTINUED | OUTPATIENT
Start: 2024-11-13 | End: 2024-11-13 | Stop reason: HOSPADM

## 2024-11-13 RX ORDER — INSULIN LISPRO 100 [IU]/ML
0-4 INJECTION, SOLUTION INTRAVENOUS; SUBCUTANEOUS
Status: DISCONTINUED | OUTPATIENT
Start: 2024-11-13 | End: 2024-11-13 | Stop reason: HOSPADM

## 2024-11-13 RX ORDER — SODIUM CHLORIDE, SODIUM LACTATE, POTASSIUM CHLORIDE, CALCIUM CHLORIDE 600; 310; 30; 20 MG/100ML; MG/100ML; MG/100ML; MG/100ML
INJECTION, SOLUTION INTRAVENOUS
Status: DISCONTINUED | OUTPATIENT
Start: 2024-11-13 | End: 2024-11-13 | Stop reason: SDUPTHER

## 2024-11-13 RX ADMIN — SODIUM CHLORIDE, SODIUM LACTATE, POTASSIUM CHLORIDE, AND CALCIUM CHLORIDE: 600; 310; 30; 20 INJECTION, SOLUTION INTRAVENOUS at 11:15

## 2024-11-13 RX ADMIN — PROPOFOL 100 MG: 10 INJECTION, EMULSION INTRAVENOUS at 11:16

## 2024-11-13 RX ADMIN — LIDOCAINE HYDROCHLORIDE 100 MG: 20 INJECTION, SOLUTION EPIDURAL; INFILTRATION; INTRACAUDAL; PERINEURAL at 11:16

## 2024-11-13 RX ADMIN — SODIUM CHLORIDE: 9 INJECTION, SOLUTION INTRAVENOUS at 10:29

## 2024-11-13 RX ADMIN — INSULIN LISPRO 2 UNITS: 100 INJECTION, SOLUTION INTRAVENOUS; SUBCUTANEOUS at 10:24

## 2024-11-13 ASSESSMENT — PAIN SCALES - GENERAL
PAINLEVEL_OUTOF10: 0
PAINLEVEL_OUTOF10: 0

## 2024-11-13 ASSESSMENT — PAIN - FUNCTIONAL ASSESSMENT
PAIN_FUNCTIONAL_ASSESSMENT: 0-10

## 2024-11-13 NOTE — ANESTHESIA POSTPROCEDURE EVALUATION
Department of Anesthesiology  Postprocedure Note    Patient: Cathie Walden  MRN: [id number]  YOB: 1950  Date of evaluation: 11/13/2024    Procedure Summary       Date: 11/13/24 Room / Location: [facility] ENDO 02 / [facility] ENDOSCOPY    Anesthesia Start: 1113 Anesthesia Stop: 1138    Procedure: COLONOSCOPY w polypectomies (Abdomen) Diagnosis:       Insulin dependent type 2 diabetes mellitus (HCC)      Screening for malignant neoplasm of colon      Obesity (BMI 30-39.9)      (Insulin dependent type 2 diabetes mellitus (HCC) [E11.9, Z79.4])      (Screening for malignant neoplasm of colon [Z12.11])      (Obesity (BMI 30-39.9) [E66.9])    Surgeons: Balta Sharma MD Responsible Provider: Alton Denis MD    Anesthesia Type: MAC ASA Status: 3            Anesthesia Type: MAC    Neri Phase I: Neri Score: 8    Neri Phase II: Neri Score: 10    Anesthesia Post Evaluation    Patient location during evaluation: PACU  Patient participation: complete - patient participated  Level of consciousness: awake  Airway patency: patent  Nausea & Vomiting: no vomiting and no nausea  Cardiovascular status: hemodynamically stable  Respiratory status: acceptable  Hydration status: euvolemic  Pain management: adequate    No notable events documented.

## 2024-11-13 NOTE — H&P
VASCULAR SURGERY  11/2017    BICA<50%, RIGHT vertebral art obst    VASCULAR SURGERY  04/2018    RABI 1.0/LABI  1.1    VASCULAR SURGERY  12/2017    CTA head/neck showed no aneurysm, <50% right intracranial ICA, diminutive right vertebral art       ROS: negative    Physical Exam: /70   Pulse 60   Temp 98.5 °F (36.9 °C) (Oral)   Resp 14   Ht 1.6 m (5' 3\")   Wt 78.6 kg (173 lb 4.8 oz)   LMP  (LMP Unknown)   SpO2 97%   BMI 30.70 kg/m²   General appearance: alert, no distress  Eyes: pupils equal and reactive, extraocular eye movements intact  Nodes: No gross adenopathy in neck.  Skin: no spider angiomata, jaundice, palmar erythema   Respiratory: clear to auscultation bilaterally  Cardiovascular: regular heart rate, no murmurs, no JVD, normal rate and regular rhythm  Abdomen: soft, non-tender, liver not enlarged, spleen not palpable, no obvious ascites  Extremities: no muscle wasting, no gross arthritic changes  Neurologic: alert and oriented, cranial nerves grossly intact, no asterixis    Labs:   Recent Results (from the past 24 hour(s))   POCT Glucose    Collection Time: 11/13/24 10:16 AM   Result Value Ref Range    POC Glucose 271 (H) 70 - 110 mg/dL       Imp/ Plan: Will proceed with Colonoscopy as planned. Risk benefits alternative including but not limited to infection, bleeding, perforation of viscous, allergic reaction and resultant morbidity and mortality was discussed. Chance of missing a significant lesion due to various reasons were discussed.    Balta Sharma MD   [facility]

## 2024-11-13 NOTE — ANESTHESIA PRE PROCEDURE
Department of Anesthesiology  Preprocedure Note       Name:  Cathie Walden   Age:  74 y.o.  :  1950                                          MRN:  [id number]         Date:  2024      Surgeon: Surgeon(s):  Balta Sharma MD    Procedure: Procedure(s):  COLONOSCOPY    Medications prior to admission:   Prior to Admission medications    Medication Sig Start Date End Date Taking? Authorizing Provider   ketorolac (ACULAR) 0.5 % ophthalmic solution instill 1 drop into right eye twice a day START 3 days prior to surgery 24  Yes Jose M Oseguera MD   LOTEMAX SM 0.38 % GEL instill 1 drop into right eye BID 10/30/24  Yes Jose M Oseguera MD   moxifloxacin (VIGAMOX) 0.5 % ophthalmic solution instill 1 drop into right eye four times a day STARTING 3 DAYS PRIOR TO PROCEDURE 24  Yes Jose M Oseguera MD   carvedilol (COREG) 25 MG tablet take 1 tablet by mouth twice a day with meals 6/3/24  Yes Tom Diez MD   COMBIGAN 0.2-0.5 % ophthalmic solution Place 1 drop into the right eye every morning 24  Yes Jose M Oseguera MD   amLODIPine (NORVASC) 10 MG tablet Take 1 tablet by mouth daily   Yes Automatic Reconciliation, Ar   VENTOLIN  (90 Base) MCG/ACT inhaler inhale 2 puffs every 4 hours if needed 10/16/24   Jose M Oseguera MD   Semaglutide,0.25 or 0.5MG/DOS, (OZEMPIC, 0.25 OR 0.5 MG/DOSE,) 2 MG/3ML SOPN Inject into the skin    Jose M Oseguera MD   clopidogrel (PLAVIX) 75 MG tablet take 1 tablet by mouth once daily 24   Tom Diez MD   BASAGLAR KWIKPEN 100 UNIT/ML injection pen inject 60 units subcutaneously nightly at bedtime 24   Jose M Oseguera MD   glimepiride (AMARYL) 2 MG tablet Take 1 tablet by mouth every morning 24   Jose M Oseguera MD   ROCKLATAN 0.02-0.005 % ophthalmic solution Place 1 drop into both eyes every evening 24   Jose M Oseguera MD   olmesartan (BENICAR) 20 MG tablet take 1 tablet by mouth

## 2024-11-15 ENCOUNTER — HOSPITAL ENCOUNTER (OUTPATIENT)
Facility: HOSPITAL | Age: 74
Setting detail: SPECIMEN
Discharge: HOME | End: 2024-11-18
Payer: MEDICARE

## 2024-11-15 DIAGNOSIS — E11.21 TYPE 2 DIABETES WITH NEPHROPATHY (HCC): ICD-10-CM

## 2024-11-15 LAB
ALBUMIN SERPL-MCNC: 3.5 G/DL (ref 3.4–5)
ALBUMIN/GLOB SERPL: 1 (ref 0.8–1.7)
ALP SERPL-CCNC: 110 U/L (ref 45–117)
ALT SERPL-CCNC: 24 U/L (ref 13–56)
ANION GAP SERPL CALC-SCNC: 5 MMOL/L (ref 3–18)
AST SERPL-CCNC: 15 U/L (ref 10–38)
BILIRUB SERPL-MCNC: 0.4 MG/DL (ref 0.2–1)
BUN SERPL-MCNC: 15 MG/DL (ref 7–18)
BUN/CREAT SERPL: 13 (ref 12–20)
CALCIUM SERPL-MCNC: 9.2 MG/DL (ref 8.5–10.1)
CHLORIDE SERPL-SCNC: 107 MMOL/L (ref 100–111)
CHOLEST SERPL-MCNC: 150 MG/DL
CO2 SERPL-SCNC: 29 MMOL/L (ref 21–32)
CREAT SERPL-MCNC: 1.12 MG/DL (ref 0.6–1.3)
CREAT UR-MCNC: 153 MG/DL (ref 30–125)
GLOBULIN SER CALC-MCNC: 3.5 G/DL (ref 2–4)
GLUCOSE SERPL-MCNC: 124 MG/DL (ref 74–99)
HBA1C MFR BLD: 10 % (ref 4.2–5.6)
HDLC SERPL-MCNC: 39 MG/DL (ref 40–60)
HDLC SERPL: 3.8 (ref 0–5)
LDLC SERPL CALC-MCNC: 87.4 MG/DL (ref 0–100)
LIPID PANEL: ABNORMAL
MICROALBUMIN UR-MCNC: 8.95 MG/DL (ref 0–3)
MICROALBUMIN/CREAT UR-RTO: 58 MG/G (ref 0–30)
POTASSIUM SERPL-SCNC: 4.1 MMOL/L (ref 3.5–5.5)
PROT SERPL-MCNC: 7 G/DL (ref 6.4–8.2)
SODIUM SERPL-SCNC: 141 MMOL/L (ref 136–145)
TRIGL SERPL-MCNC: 118 MG/DL
VLDLC SERPL CALC-MCNC: 23.6 MG/DL

## 2024-11-15 PROCEDURE — 80053 COMPREHEN METABOLIC PANEL: CPT

## 2024-11-15 PROCEDURE — 83036 HEMOGLOBIN GLYCOSYLATED A1C: CPT

## 2024-11-15 PROCEDURE — 82570 ASSAY OF URINE CREATININE: CPT

## 2024-11-15 PROCEDURE — 82043 UR ALBUMIN QUANTITATIVE: CPT

## 2024-11-15 PROCEDURE — 80061 LIPID PANEL: CPT

## 2024-11-15 PROCEDURE — 36415 COLL VENOUS BLD VENIPUNCTURE: CPT

## 2024-11-26 ENCOUNTER — OFFICE VISIT (OUTPATIENT)
Facility: CLINIC | Age: 74
End: 2024-11-26

## 2024-11-26 DIAGNOSIS — E78.5 DYSLIPIDEMIA: ICD-10-CM

## 2024-11-26 DIAGNOSIS — I25.10 CORONARY ARTERY DISEASE INVOLVING NATIVE CORONARY ARTERY OF NATIVE HEART WITHOUT ANGINA PECTORIS: ICD-10-CM

## 2024-11-26 DIAGNOSIS — E66.3 OVERWEIGHT WITH BODY MASS INDEX (BMI) OF 25 TO 25.9 IN ADULT: ICD-10-CM

## 2024-11-26 DIAGNOSIS — I10 ESSENTIAL HYPERTENSION: ICD-10-CM

## 2024-11-26 DIAGNOSIS — I65.29 SYMPTOMATIC CAROTID ARTERY STENOSIS, UNSPECIFIED LATERALITY: ICD-10-CM

## 2024-11-26 DIAGNOSIS — N18.31 STAGE 3A CHRONIC KIDNEY DISEASE (HCC): ICD-10-CM

## 2024-11-26 DIAGNOSIS — E11.21 TYPE 2 DIABETES WITH NEPHROPATHY (HCC): Primary | ICD-10-CM

## 2024-11-26 DIAGNOSIS — G45.9 TIA (TRANSIENT ISCHEMIC ATTACK): ICD-10-CM

## 2024-11-26 NOTE — PROGRESS NOTES
Cathie Walden presents today for   Chief Complaint   Patient presents with    6 Month Follow-Up    Hypertension    Diabetes       \"Have you been to the ER, urgent care clinic since your last visit?  Hospitalized since your last visit?\"    NO    “Have you seen or consulted any other health care providers outside of [facility] since your last visit?”    NO             
109, hdl 39, ldl-c 61  From 5/24 showed   gluc 63,   cr 1.04, gfr 57,     hba1c 9.7,   umar 90,        wbc 7.4, hb 14.4, plt 272    Results for orders placed or performed during the hospital encounter of 11/15/24 (from the past 2160 hour(s))   Comprehensive Metabolic Panel   Result Value Ref Range    Sodium 141 136 - 145 mmol/L    Potassium 4.1 3.5 - 5.5 mmol/L    Chloride 107 100 - 111 mmol/L    CO2 29 21 - 32 mmol/L    Anion Gap 5 3.0 - 18 mmol/L    Glucose 124 (H) 74 - 99 mg/dL    BUN 15 7.0 - 18 MG/DL    Creatinine 1.12 0.6 - 1.3 MG/DL    BUN/Creatinine Ratio 13 12 - 20      Est, Glom Filt Rate 52 (L) >60 ml/min/1.73m2    Calcium 9.2 8.5 - 10.1 MG/DL    Total Bilirubin 0.4 0.2 - 1.0 MG/DL    ALT 24 13 - 56 U/L    AST 15 10 - 38 U/L    Alk Phosphatase 110 45 - 117 U/L    Total Protein 7.0 6.4 - 8.2 g/dL    Albumin 3.5 3.4 - 5.0 g/dL    Globulin 3.5 2.0 - 4.0 g/dL    Albumin/Globulin Ratio 1.0 0.8 - 1.7     HEMOGLOBIN A1C W/O EAG   Result Value Ref Range    Hemoglobin A1C 10.0 (H) 4.2 - 5.6 %   Microalbumin / Creatinine Urine Ratio   Result Value Ref Range    Microalb, Ur 8.95 (H) 0 - 3.0 MG/DL    Creatinine, Ur 153.00 (H) 30 - 125 mg/dL    Microalb/Creat Ratio 58 (H) 0 - 30 mg/g   Lipid Panel   Result Value Ref Range    LIPID PANEL        Cholesterol, Total 150 <200 MG/DL    Triglycerides 118 <150 MG/DL    HDL 39 (L) 40 - 60 MG/DL    LDL Cholesterol 87.4 0 - 100 MG/DL    VLDL Cholesterol Calculated 23.6 MG/DL    Chol/HDL Ratio 3.8 0 - 5.0     Results for orders placed or performed during the hospital encounter of 11/13/24 (from the past 2160 hour(s))   POCT Glucose   Result Value Ref Range    POC Glucose 271 (H) 70 - 110 mg/dL   POCT Glucose   Result Value Ref Range    POC Glucose 221 (H) 70 - 110 mg/dL   Results for orders placed or performed in visit on 11/12/24 (from the past 2160 hour(s))   AMB POC URINALYSIS DIP STICK AUTO W/O MICRO   Result Value Ref Range    Color, Urine, POC Yellow     Clarity, Urine, POC

## 2024-11-27 VITALS
HEIGHT: 63 IN | SYSTOLIC BLOOD PRESSURE: 136 MMHG | TEMPERATURE: 98.5 F | WEIGHT: 176 LBS | HEART RATE: 60 BPM | RESPIRATION RATE: 16 BRPM | BODY MASS INDEX: 31.18 KG/M2 | OXYGEN SATURATION: 99 % | DIASTOLIC BLOOD PRESSURE: 72 MMHG

## 2025-01-10 DIAGNOSIS — I65.23 BILATERAL CAROTID ARTERY STENOSIS: Primary | ICD-10-CM

## 2025-01-15 RX ORDER — FLUTICASONE PROPIONATE 50 MCG
2 SPRAY, SUSPENSION (ML) NASAL DAILY
Qty: 16 G | Refills: 11 | Status: SHIPPED | OUTPATIENT
Start: 2025-01-15

## 2025-01-29 ENCOUNTER — OFFICE VISIT (OUTPATIENT)
Age: 75
End: 2025-01-29
Payer: MEDICARE

## 2025-01-29 VITALS
OXYGEN SATURATION: 98 % | WEIGHT: 165 LBS | HEART RATE: 61 BPM | DIASTOLIC BLOOD PRESSURE: 70 MMHG | BODY MASS INDEX: 29.23 KG/M2 | HEIGHT: 63 IN | SYSTOLIC BLOOD PRESSURE: 100 MMHG

## 2025-01-29 DIAGNOSIS — I65.23 CAROTID STENOSIS, ASYMPTOMATIC, BILATERAL: Primary | ICD-10-CM

## 2025-01-29 DIAGNOSIS — I65.01 VERTEBRAL ARTERY OCCLUSION, RIGHT: ICD-10-CM

## 2025-01-29 PROCEDURE — G8428 CUR MEDS NOT DOCUMENT: HCPCS | Performed by: SURGERY

## 2025-01-29 PROCEDURE — G8417 CALC BMI ABV UP PARAM F/U: HCPCS | Performed by: SURGERY

## 2025-01-29 PROCEDURE — G8399 PT W/DXA RESULTS DOCUMENT: HCPCS | Performed by: SURGERY

## 2025-01-29 PROCEDURE — 1123F ACP DISCUSS/DSCN MKR DOCD: CPT | Performed by: SURGERY

## 2025-01-29 PROCEDURE — 3017F COLORECTAL CA SCREEN DOC REV: CPT | Performed by: SURGERY

## 2025-01-29 PROCEDURE — 3074F SYST BP LT 130 MM HG: CPT | Performed by: SURGERY

## 2025-01-29 PROCEDURE — 1090F PRES/ABSN URINE INCON ASSESS: CPT | Performed by: SURGERY

## 2025-01-29 PROCEDURE — 3078F DIAST BP <80 MM HG: CPT | Performed by: SURGERY

## 2025-01-29 PROCEDURE — 99214 OFFICE O/P EST MOD 30 MIN: CPT | Performed by: SURGERY

## 2025-01-29 PROCEDURE — 1036F TOBACCO NON-USER: CPT | Performed by: SURGERY

## 2025-01-29 NOTE — PROGRESS NOTES
[facility]    Vascular Surgery Office Visit    Cathie Walden  Chief Complaint   Patient presents with    Carotid Disease     Follow up with studies        History:  74 y.o. female with carotid stenosis returns for follow up after having undergone scheduled surveillance carotid imaging. She was last seen in our practice 1 y ago.  She denies sx of CVA, TIA, or amaurosis fugax since her last visit. She does have a hx of a TIA in approx 2018, at which time no signs of CVA were present on MRI. She was started on ASA and plavix at that time, and has not experience any further sx. She denies claudication or rest pain. No syncope.      Physical Exam:    /70   Pulse 61   Ht 1.6 m (5' 3\")   Wt 74.8 kg (165 lb)   LMP  (LMP Unknown)   SpO2 98%   BMI 29.23 kg/m²      Constitutional:  Patient is well developed, well nourished, and not distressed.   HEENT: Atraumatic, normocephalic. No carotid bruits appreciated.  Cardiovascular:  Normal rate, regular rhythm, normal heart sounds.  Pulmonary/Chest: Effort normal and breath sounds normal.    Abdominal:   Soft, non-distended. No tenderness to palpation.   Extremities: BUE and BLE warm.   Neurological:  she  is alert and oriented x3. Motor & sensory grossly intact in all 4 limbs.       Imaging/Studies:   January 2025  Carotid duplex: Moderate stenosis of the R ICA, stable (PSV 170cm/s). L ICA mild-moderate stenosis (PSV 156cm/s), which is essentially stable from the prior study. Dilation of the right subclavian artery, measuring approx 1.5cm which is a new finding. Near occluded R VA which was present on the prior exam.     Jan 2024  Carotid duplex: Moderate R ICA stenosis (PSV 146cm/s), L ICA modearte stenosis (PSV 177cm/s).   Occluded R vertebral artery.     Impression:  Essentially stable bilateral carotid artery moderate stenosis.   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

## 2025-01-31 RX ORDER — ATORVASTATIN CALCIUM 40 MG/1
TABLET, FILM COATED ORAL
Qty: 90 TABLET | Refills: 2 | Status: SHIPPED | OUTPATIENT
Start: 2025-01-31

## 2025-02-27 ENCOUNTER — OFFICE VISIT (OUTPATIENT)
Age: 75
End: 2025-02-27

## 2025-02-27 VITALS
OXYGEN SATURATION: 96 % | BODY MASS INDEX: 31.01 KG/M2 | DIASTOLIC BLOOD PRESSURE: 68 MMHG | WEIGHT: 175 LBS | HEART RATE: 73 BPM | HEIGHT: 63 IN | SYSTOLIC BLOOD PRESSURE: 152 MMHG

## 2025-02-27 DIAGNOSIS — I25.10 CORONARY ARTERY DISEASE, UNSPECIFIED VESSEL OR LESION TYPE, UNSPECIFIED WHETHER ANGINA PRESENT, UNSPECIFIED WHETHER NATIVE OR TRANSPLANTED HEART: ICD-10-CM

## 2025-02-27 DIAGNOSIS — Z95.820 STATUS POST ANGIOPLASTY WITH STENT: Primary | ICD-10-CM

## 2025-02-27 DIAGNOSIS — I10 ESSENTIAL HYPERTENSION: ICD-10-CM

## 2025-02-27 ASSESSMENT — PATIENT HEALTH QUESTIONNAIRE - PHQ9
SUM OF ALL RESPONSES TO PHQ QUESTIONS 1-9: 0
SUM OF ALL RESPONSES TO PHQ QUESTIONS 1-9: 0
SUM OF ALL RESPONSES TO PHQ9 QUESTIONS 1 & 2: 0
SUM OF ALL RESPONSES TO PHQ QUESTIONS 1-9: 0
SUM OF ALL RESPONSES TO PHQ QUESTIONS 1-9: 0
2. FEELING DOWN, DEPRESSED OR HOPELESS: NOT AT ALL
1. LITTLE INTEREST OR PLEASURE IN DOING THINGS: NOT AT ALL

## 2025-02-27 NOTE — PROGRESS NOTES
Cathie Walden    CAD    HPI    Cathie Walden is a 74 y.o. with carotid disease and h/o TIA, who I met 6/2023 when referred for perioperative risk ratification prior to urological procedure, with abnormal cardiac testing.    You know patient was admitted for urosepsis with ureteral stone and right hydronephrosis at [facility] earlier in May she tells me she currently has a stent that needs to be removed needs to have another kidney stone taken out and then potentially another ureteral stent placed.  She is having hematuria and some flank pain.    She is obviously not been as active but I guess was having some shortness of breath so had a nuclear stress test 4/13/23: EF 52% but she had a moderate severity left ventricular stress perfusion defect in the inferolateral segment that was partially reversible, no TID.    She is now s/p staged PCI and doing well. She has no CV complaints, no more pain at all and really improved functional status with cardiac rehab!    Past Medical History:   Diagnosis Date    Allergic rhinitis     Asbestos exposure     father; pleural plaques on xray 2018    CAD (coronary artery disease)     Dr Emery; cath (8/23) occ mid-RCA, 30% calc LAD, 80-85% prox DMi, 90-95% dist Cx, 99% OM2 which was ptca and stented; staged stent (9/23) residual 70% OM2 stented, 85% DMi stented    Carotid artery disease 2017    Dr Freeman; [place]    CKD (chronic kidney disease) 04/2023    Colon adenomas 11/2024    Dr Sharma    DM (diabetes mellitus) ([facility])     Dr Scott; microalbumin 8/23    GERD (gastroesophageal reflux disease)     H/O cardiovascular stress test     thallium neg ef 70% (3/12); Dr Olmos; NST neg, ef 76% (2018); NST mod risk, ef 50%, inferolat isch, TID 1.12 (6/23)    H/O echocardiogram     conc lvh, ef 60%, DD (3/12); nl lv, ef 68%, conc lvh, no wma, mild dd, no valvular abn, neg bubble study (11/17 - [facility]); nl lv, ef 72%, no wma, gr 1 dd, nl valves (8/18)

## 2025-02-27 NOTE — PROGRESS NOTES
Cathie Walden presents today for   Chief Complaint   Patient presents with    Follow-up     6 month f/u        Cathienegro Walden preferred language for health care discussion is english/other.    Is someone accompanying this pt? no    Is the patient using any DME equipment during OV? no    Depression Screening:  Depression: Not at risk (2/27/2025)    PHQ-2     PHQ-2 Score: 0        Learning Assessment:  Who is the primary learner? Patient    What is the preferred language for health care of the primary learner? ENGLISH    How does the primary learner prefer to learn new concepts? DEMONSTRATION    Answered By PATIENT    Relationship to Learner SELF           Pt currently taking Anticoagulant therapy? no    Pt currently taking Antiplatelet therapy ? ASA 81 mg QD and Plavix 75 mg QD       Coordination of Care:  1. Have you been to the ER, urgent care clinic since your last visit? Hospitalized since your last visit? no    2. Have you seen or consulted any other health care providers outside of the [facility] since your last visit? Include any pap smears or colon screening. no

## 2025-05-21 ENCOUNTER — HOSPITAL ENCOUNTER (OUTPATIENT)
Facility: HOSPITAL | Age: 75
Setting detail: SPECIMEN
Discharge: HOME | End: 2025-05-24
Payer: MEDICARE

## 2025-05-21 DIAGNOSIS — E11.21 TYPE 2 DIABETES WITH NEPHROPATHY (HCC): ICD-10-CM

## 2025-05-21 LAB
ANION GAP SERPL CALC-SCNC: 14 MMOL/L (ref 3–18)
BASOPHILS # BLD: 0.02 K/UL (ref 0–0.1)
BASOPHILS NFR BLD: 0.2 % (ref 0–2)
BUN SERPL-MCNC: 17 MG/DL (ref 6–23)
BUN/CREAT SERPL: 16 (ref 12–20)
CALCIUM SERPL-MCNC: 9.9 MG/DL (ref 8.5–10.1)
CHLORIDE SERPL-SCNC: 101 MMOL/L (ref 98–107)
CHOLEST SERPL-MCNC: 144 MG/DL
CO2 SERPL-SCNC: 27 MMOL/L (ref 21–32)
CREAT SERPL-MCNC: 1.03 MG/DL (ref 0.6–1.3)
CREAT UR-MCNC: 223 MG/DL (ref 30–125)
DIFFERENTIAL METHOD BLD: ABNORMAL
EOSINOPHIL # BLD: 0.14 K/UL (ref 0–0.4)
EOSINOPHIL NFR BLD: 1.5 % (ref 0–5)
ERYTHROCYTE [DISTWIDTH] IN BLOOD BY AUTOMATED COUNT: 13.5 % (ref 11.6–14.5)
GLUCOSE SERPL-MCNC: 185 MG/DL (ref 74–108)
HBA1C MFR BLD: 10.2 % (ref 4.2–5.6)
HCT VFR BLD AUTO: 45.9 % (ref 35–45)
HDLC SERPL-MCNC: 33 MG/DL (ref 40–60)
HDLC SERPL: 4.3 (ref 0–5)
HGB BLD-MCNC: 14.5 G/DL (ref 12–16)
IMM GRANULOCYTES # BLD AUTO: 0.03 K/UL (ref 0–0.04)
IMM GRANULOCYTES NFR BLD AUTO: 0.3 % (ref 0–0.5)
LDLC SERPL CALC-MCNC: 85 MG/DL (ref 0–100)
LYMPHOCYTES # BLD: 1.18 K/UL (ref 0.9–3.6)
LYMPHOCYTES NFR BLD: 12.5 % (ref 21–52)
MCH RBC QN AUTO: 27.3 PG (ref 24–34)
MCHC RBC AUTO-ENTMCNC: 31.6 G/DL (ref 31–37)
MCV RBC AUTO: 86.3 FL (ref 78–100)
MICROALBUMIN UR-MCNC: 14.2 MG/DL (ref 0–3)
MICROALBUMIN/CREAT UR-RTO: 64 MG/G (ref 0–30)
MONOCYTES # BLD: 0.34 K/UL (ref 0.05–1.2)
MONOCYTES NFR BLD: 3.6 % (ref 3–10)
NEUTS SEG # BLD: 7.75 K/UL (ref 1.8–8)
NEUTS SEG NFR BLD: 81.9 % (ref 40–73)
NRBC # BLD: 0 K/UL (ref 0–0.01)
NRBC BLD-RTO: 0 PER 100 WBC
PLATELET # BLD AUTO: 308 K/UL (ref 135–420)
PMV BLD AUTO: 12 FL (ref 9.2–11.8)
POTASSIUM SERPL-SCNC: 4.4 MMOL/L (ref 3.5–5.5)
RBC # BLD AUTO: 5.32 M/UL (ref 4.2–5.3)
SODIUM SERPL-SCNC: 141 MMOL/L (ref 136–145)
TRIGL SERPL-MCNC: 129 MG/DL (ref 0–150)
VLDLC SERPL CALC-MCNC: 26 MG/DL
WBC # BLD AUTO: 9.5 K/UL (ref 4.6–13.2)

## 2025-05-21 PROCEDURE — 36415 COLL VENOUS BLD VENIPUNCTURE: CPT

## 2025-05-21 PROCEDURE — 83036 HEMOGLOBIN GLYCOSYLATED A1C: CPT

## 2025-05-21 PROCEDURE — 80061 LIPID PANEL: CPT

## 2025-05-21 PROCEDURE — 85025 COMPLETE CBC W/AUTO DIFF WBC: CPT

## 2025-05-21 PROCEDURE — 82043 UR ALBUMIN QUANTITATIVE: CPT

## 2025-05-21 PROCEDURE — 80048 BASIC METABOLIC PNL TOTAL CA: CPT

## 2025-05-21 PROCEDURE — 82570 ASSAY OF URINE CREATININE: CPT

## 2025-05-28 ENCOUNTER — OFFICE VISIT (OUTPATIENT)
Facility: CLINIC | Age: 75
End: 2025-05-28

## 2025-05-28 VITALS
OXYGEN SATURATION: 98 % | HEART RATE: 61 BPM | SYSTOLIC BLOOD PRESSURE: 125 MMHG | BODY MASS INDEX: 31.71 KG/M2 | TEMPERATURE: 98.1 F | DIASTOLIC BLOOD PRESSURE: 76 MMHG | RESPIRATION RATE: 16 BRPM | HEIGHT: 63 IN | WEIGHT: 179 LBS

## 2025-05-28 DIAGNOSIS — I25.10 CORONARY ARTERY DISEASE INVOLVING NATIVE CORONARY ARTERY OF NATIVE HEART WITHOUT ANGINA PECTORIS: ICD-10-CM

## 2025-05-28 DIAGNOSIS — E11.21 TYPE 2 DIABETES WITH NEPHROPATHY (HCC): ICD-10-CM

## 2025-05-28 DIAGNOSIS — J84.10 PULMONARY FIBROSIS (HCC): ICD-10-CM

## 2025-05-28 DIAGNOSIS — E78.5 DYSLIPIDEMIA: ICD-10-CM

## 2025-05-28 DIAGNOSIS — Z00.00 MEDICARE ANNUAL WELLNESS VISIT, SUBSEQUENT: Primary | ICD-10-CM

## 2025-05-28 DIAGNOSIS — Z71.89 ADVANCED CARE PLANNING/COUNSELING DISCUSSION: ICD-10-CM

## 2025-05-28 DIAGNOSIS — N18.31 STAGE 3A CHRONIC KIDNEY DISEASE (HCC): ICD-10-CM

## 2025-05-28 DIAGNOSIS — I10 ESSENTIAL HYPERTENSION: ICD-10-CM

## 2025-05-28 SDOH — ECONOMIC STABILITY: FOOD INSECURITY: WITHIN THE PAST 12 MONTHS, YOU WORRIED THAT YOUR FOOD WOULD RUN OUT BEFORE YOU GOT MONEY TO BUY MORE.: NEVER TRUE

## 2025-05-28 SDOH — ECONOMIC STABILITY: FOOD INSECURITY: WITHIN THE PAST 12 MONTHS, THE FOOD YOU BOUGHT JUST DIDN'T LAST AND YOU DIDN'T HAVE MONEY TO GET MORE.: NEVER TRUE

## 2025-05-28 ASSESSMENT — PATIENT HEALTH QUESTIONNAIRE - PHQ9
SUM OF ALL RESPONSES TO PHQ QUESTIONS 1-9: 0
1. LITTLE INTEREST OR PLEASURE IN DOING THINGS: NOT AT ALL
2. FEELING DOWN, DEPRESSED OR HOPELESS: NOT AT ALL
SUM OF ALL RESPONSES TO PHQ QUESTIONS 1-9: 0

## 2025-05-28 ASSESSMENT — LIFESTYLE VARIABLES
HOW OFTEN DO YOU HAVE A DRINK CONTAINING ALCOHOL: NEVER
HOW MANY STANDARD DRINKS CONTAINING ALCOHOL DO YOU HAVE ON A TYPICAL DAY: PATIENT DOES NOT DRINK

## 2025-05-30 PROBLEM — J84.10 PULMONARY FIBROSIS (HCC): Status: ACTIVE | Noted: 2025-05-30

## 2025-05-30 RX ORDER — ATORVASTATIN CALCIUM 80 MG/1
80 TABLET, FILM COATED ORAL DAILY
Qty: 90 TABLET | Refills: 3 | Status: SHIPPED | OUTPATIENT
Start: 2025-05-30

## 2025-05-30 NOTE — ACP (ADVANCE CARE PLANNING)
Advance Care Planning     Advance Care Planning (ACP) Physician/NP/PA Conversation    Date of Conversation: 5/28/2025  Conducted with: Patient with Decision Making Capacity    Healthcare Decision Maker:      Primary Decision Maker: Gunjan Ga - Brother/Sister - [phone]    Primary Decision Maker: Alice Walden - Martha - [phone]    Click here to complete Healthcare Decision Makers including selection of the Healthcare Decision Maker Relationship (ie \"Primary\")  Today we documented Decision Maker(s) consistent with Legal Next of Kin hierarchy.    Care Preferences:    Hospitalization:  \"If your health worsens and it becomes clear that your chance of recovery is unlikely, what would be your preference regarding hospitalization?\"  The patient would prefer hospitalization.    Ventilation:  \"If you were unable to breath on your own and your chance of recovery was unlikely, what would be your preference about the use of a ventilator (breathing machine) if it was available to you?\"  The patient would desire the use of a ventilator.    Resuscitation:  \"In the event your heart stopped as a result of an underlying serious health condition, would you want attempts made to restart your heart, or would you prefer a natural death?\"  Yes, attempt to resuscitate.    ventilation preferences, hospitalization preferences, and resuscitation preferences    Conversation Outcomes / Follow-Up Plan:  ACP in process - information provided, considering goals and options  Reviewed DNR/DNI and patient elects Full Code (Attempt Resuscitation)    Length of Voluntary ACP Conversation in minutes:  16 minutes    JULIÁN ARELLANO MD

## 2025-05-30 NOTE — PROGRESS NOTES
74 y.o. female who presents for evaluation.      Her   in Dec after long illness. She seems to be coping well     Seeing Dr Tapia for the asbestosis and ILD and no new developments    No cp, sob, pnd, edema, seeing Dr Mckay.  She was going to cardiac rehab for exercise.      Seeing Dr Hayes but no current no claudication issues     Dr Scott is managing the DM but she really has not gotten to [facility]. We had referred her ot Dr Diehl for 2nd opinion but this happened as her  was sick so she didn't go.  Denies polyuria, polydipsia, nocturia, vision change.  She might be interested in a pump if a candidate.  Reports that sglt2 was too expensive?    No new gi or gu complaints    LAST MEDICARE WELLNESS EXAM: 25    Past Medical History:   Diagnosis Date    Allergic rhinitis     Asbestos exposure     father; pleural plaques on xray     CAD (coronary artery disease)     Dr Emery; cath () occ mid-RCA, 30% calc LAD, 80-85% prox DMi, 90-95% dist Cx, 99% OM2 which was ptca and stented; staged stent () residual 70% OM2 stented, 85% DMi stented    Carotid artery disease     Dr Freeman; PA [place]    CKD (chronic kidney disease) 2023    Colon adenomas 2024    Dr Sharma    DM (diabetes mellitus) (HCC)     Dr Scott; microalbumin     GERD (gastroesophageal reflux disease)     H/O cardiovascular stress test     thallium neg ef 70% (3/12); Dr Olmos; NST neg, ef 76% (); NST mod risk, ef 50%, inferolat isch, TID 1.12 ()    H/O echocardiogram     conc lvh, ef 60%, DD (3/12); nl lv, ef 68%, conc lvh, no wma, mild dd, no valvular abn, neg bubble study ( - [facility]); nl lv, ef 72%, no wma, gr 1 dd, nl valves ()    History of Holter monitoring 2018    few pac/pvc, 4 beat run svt rate 156 ()    Hyperlipidemia     Hypertension     Metabolic dysfunction-associated steatotic liver disease (MASLD) 2024    fatty liver on CT; small stones/sludge gb; Fib-4 was 
Cathie Walden presents today for   Chief Complaint   Patient presents with    Medicare AWV       \"Have you been to the ER, urgent care clinic since your last visit?  Hospitalized since your last visit?\"    NO    “Have you seen or consulted any other health care providers outside of [facility] since your last visit?”    NO             
Sig Taking? Authorizing Provider   atorvastatin (LIPITOR) 80 MG tablet Take 1 tablet by mouth daily Yes Tom Diez MD   fluticasone (FLONASE) 50 MCG/ACT nasal spray INSTILL 2 SPRAYS INTO EACH NOSTRIL ONCE DAILY Yes Tmo Diez MD   VENTOLIN  (90 Base) MCG/ACT inhaler inhale 2 puffs every 4 hours if needed Yes Jose M Oseguera MD   Semaglutide,0.25 or 0.5MG/DOS, (OZEMPIC, 0.25 OR 0.5 MG/DOSE,) 2 MG/3ML SOPN Inject into the skin Yes Jose M Oseguera MD   clopidogrel (PLAVIX) 75 MG tablet take 1 tablet by mouth once daily Yes Tom Diez MD   BASAGLAR KWIKPEN 100 UNIT/ML injection pen inject 60 units subcutaneously nightly at bedtime Yes Jose M Oseguera MD   carvedilol (COREG) 25 MG tablet take 1 tablet by mouth twice a day with meals Yes Tom Diez MD   glimepiride (AMARYL) 2 MG tablet Take 1 tablet by mouth every morning Yes Jose M Oseguera MD   ROCKLATAN 0.02-0.005 % ophthalmic solution Place 1 drop into both eyes every evening Yes Jose M Oseguera MD   olmesartan (BENICAR) 20 MG tablet take 1 tablet by mouth once daily Yes Tom Diez MD   Multiple Vitamins-Minerals (HAIR SKIN AND NAILS FORMULA) TABS Take 1 tablet by mouth daily Yes Jose M Oseguera MD   Multiple Vitamins-Minerals (CENTRUM SILVER ULTRA WOMENS PO) Take by mouth Yes ProviderJose M MD   metFORMIN (GLUCOPHAGE) 1000 MG tablet Take 1 tablet by mouth daily (with breakfast) Yes ProviderJose M MD   aspirin (ASPIRIN CHILDRENS) 81 MG chewable tablet Take 1 tablet by mouth daily Yes Josh Emery MD   DROPLET PEN NEEDLES 32G X 4 MM MISC  Yes Jose M Oseguera MD   HUMALOG KWIKPEN 100 UNIT/ML SOPN Inject into the skin SLIDING SCALE Yes Jose M Oseguera MD   amLODIPine (NORVASC) 10 MG tablet Take 1 tablet by mouth daily Yes Automatic Reconciliation, Ar   vitamin D3 (CHOLECALCIFEROL) 125 MCG (5000 UT) TABS tablet Take 1 tablet by mouth daily Yes Automatic

## 2025-05-30 NOTE — PATIENT INSTRUCTIONS

## 2025-06-02 ENCOUNTER — TELEPHONE (OUTPATIENT)
Facility: CLINIC | Age: 75
End: 2025-06-02

## 2025-06-02 NOTE — TELEPHONE ENCOUNTER
Pt called stated she had  one of her medication, but her fluid pills suppose to at the pharmacy as well, she needs a prescription put in for it     Location [facility] on [address]

## 2025-06-04 ENCOUNTER — TELEPHONE (OUTPATIENT)
Facility: CLINIC | Age: 75
End: 2025-06-04

## 2025-06-04 NOTE — TELEPHONE ENCOUNTER
Pt called stated she recall that she was told that she will be on fluid pills from the doctor cause her ankles was swelling

## 2025-06-04 NOTE — TELEPHONE ENCOUNTER
Pt called stated she recall that she was told that she  will be on fluid pills from the doctor cause her ankles was swelling    Spoke with pt. Pt advised prescription is ready. Pt will call back with fax number.

## 2025-06-05 RX ORDER — HYDROCHLOROTHIAZIDE 12.5 MG/1
12.5 CAPSULE ORAL EVERY MORNING
Qty: 90 CAPSULE | Refills: 1 | Status: SHIPPED | OUTPATIENT
Start: 2025-06-05

## 2025-07-16 ENCOUNTER — OFFICE VISIT (OUTPATIENT)
Age: 75
End: 2025-07-16
Payer: MEDICARE

## 2025-07-16 VITALS
SYSTOLIC BLOOD PRESSURE: 102 MMHG | OXYGEN SATURATION: 96 % | DIASTOLIC BLOOD PRESSURE: 58 MMHG | HEIGHT: 63 IN | BODY MASS INDEX: 31.54 KG/M2 | RESPIRATION RATE: 15 BRPM | HEART RATE: 62 BPM | WEIGHT: 178 LBS

## 2025-07-16 DIAGNOSIS — I65.23 CAROTID STENOSIS, ASYMPTOMATIC, BILATERAL: Primary | ICD-10-CM

## 2025-07-16 DIAGNOSIS — I65.23 BILATERAL CAROTID ARTERY STENOSIS: ICD-10-CM

## 2025-07-16 PROCEDURE — G8417 CALC BMI ABV UP PARAM F/U: HCPCS | Performed by: SURGERY

## 2025-07-16 PROCEDURE — 1123F ACP DISCUSS/DSCN MKR DOCD: CPT | Performed by: SURGERY

## 2025-07-16 PROCEDURE — 3017F COLORECTAL CA SCREEN DOC REV: CPT | Performed by: SURGERY

## 2025-07-16 PROCEDURE — 1126F AMNT PAIN NOTED NONE PRSNT: CPT | Performed by: SURGERY

## 2025-07-16 PROCEDURE — 1159F MED LIST DOCD IN RCRD: CPT | Performed by: SURGERY

## 2025-07-16 PROCEDURE — 1036F TOBACCO NON-USER: CPT | Performed by: SURGERY

## 2025-07-16 PROCEDURE — 3074F SYST BP LT 130 MM HG: CPT | Performed by: SURGERY

## 2025-07-16 PROCEDURE — 99214 OFFICE O/P EST MOD 30 MIN: CPT | Performed by: SURGERY

## 2025-07-16 PROCEDURE — 3078F DIAST BP <80 MM HG: CPT | Performed by: SURGERY

## 2025-07-16 PROCEDURE — 1090F PRES/ABSN URINE INCON ASSESS: CPT | Performed by: SURGERY

## 2025-07-16 PROCEDURE — G8399 PT W/DXA RESULTS DOCUMENT: HCPCS | Performed by: SURGERY

## 2025-07-16 PROCEDURE — G8427 DOCREV CUR MEDS BY ELIG CLIN: HCPCS | Performed by: SURGERY

## 2025-07-16 NOTE — PROGRESS NOTES
Have you been to the ER, urgent care clinic since your last visit?  Hospitalized since your last visit?   NO    Have you seen or consulted any other health care providers outside our system since your last visit?   NO        
    Jan 2024  Carotid duplex: Moderate R ICA stenosis (PSV 146cm/s), L ICA modearte stenosis (PSV 177cm/s).   Occluded R vertebral artery.     Impression:  Essentially stable bilateral carotid artery moderate stenosis.   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   Intermittent L upper arm/bicep region pain. No associated chest pain or dyspnea.     Plan:  Repeat carotid duplex, which also evaluates the subclavian arteries, in 6 months. Ordered for Jan 2025.   Continue ASA and plavix.   No intervention or monitoring needed for R vertebral artery occlusion.   I advised her to discuss the L arm pain with her cardiologist. In addition, I advised her to seek emergency care if it persisted.       Jett Hayes MD  Vascular Surgeon  [facility]      I spent approximately 30 minutes on this patient encounter, which includes but is not limited to performing a history and physical exam, reviewing primary care and consultant documentation, reviewing imaging/studies, and speaking with her regarding my impression and plan.       Past Medical History:   Diagnosis Date    Allergic rhinitis     Asbestos exposure     father; pleural plaques on xray 2018    CAD (coronary artery disease)     Dr Emery; cath (8/23) occ mid-RCA, 30% calc LAD, 80-85% prox DMi, 90-95% dist Cx, 99% OM2 which was ptca and stented; staged stent (9/23) residual 70% OM2 stented, 85% DMi stented    Carotid artery disease 2017    Dr Freeman; [place]    CKD (chronic kidney disease) 04/2023    Colon adenomas 11/2024    Dr Sharma    DM (diabetes mellitus) ([facility])     Dr Scott; microalbumin 8/23    GERD (gastroesophageal reflux disease)     H/O cardiovascular stress test     thallium neg ef 70% (3/12); Dr Olmos; NST neg, ef 76% (2018); NST mod risk, ef 50%, inferolat isch, TID 1.12 (6/23)    H/O echocardiogram     conc lvh, ef 60%, DD (3/12); nl lv, ef 68%, conc

## 2025-07-31 DIAGNOSIS — I10 ESSENTIAL HYPERTENSION: Primary | ICD-10-CM

## 2025-07-31 RX ORDER — CARVEDILOL 25 MG/1
25 TABLET ORAL 2 TIMES DAILY WITH MEALS
Qty: 180 TABLET | Refills: 3 | Status: SHIPPED | OUTPATIENT
Start: 2025-07-31

## 2025-07-31 NOTE — TELEPHONE ENCOUNTER
Refill request via fax     Medication: carvedilol (COREG) 25 MG tablet   Quantity: 180  Pharmacy: [facility] #[id number] - [place], [place] - [address] [facility] - P [phone] - F [phone] [[id number]]   Last Fill: 06/03/2024    PCP: Tom Diez MD    LAST OFFICE VISIT: 05/28/2025      Future Appointments   Date Time Provider Department Center   8/18/2025  9:30 AM [email or web address] [place] Sched   8/18/2025 11:15 AM Frederick Castro MD [facility] [place] Sched   8/27/2025  9:40 AM Tabby Mckay DO [facility] BS AMB   9/23/2025  9:45 AM C LAB VISIT [facility] ECC DEP   9/30/2025  9:40 AM Tom Diez MD [facility] ECC DEP   1/20/2026 11:00 AM [facility] HV IMAGING/ NON-IMAGING 1 [facility] BS AMB   1/28/2026 10:30 AM Jett Hayes MD [facility] BS AMB

## 2025-08-27 ENCOUNTER — OFFICE VISIT (OUTPATIENT)
Age: 75
End: 2025-08-27
Payer: MEDICARE

## 2025-08-27 VITALS
BODY MASS INDEX: 26.98 KG/M2 | WEIGHT: 178 LBS | HEART RATE: 61 BPM | HEIGHT: 68 IN | SYSTOLIC BLOOD PRESSURE: 147 MMHG | DIASTOLIC BLOOD PRESSURE: 79 MMHG | OXYGEN SATURATION: 99 %

## 2025-08-27 DIAGNOSIS — I50.33 CHF (CONGESTIVE HEART FAILURE), NYHA CLASS I, ACUTE ON CHRONIC, DIASTOLIC (HCC): ICD-10-CM

## 2025-08-27 DIAGNOSIS — Z95.820 STATUS POST ANGIOPLASTY WITH STENT: Primary | ICD-10-CM

## 2025-08-27 DIAGNOSIS — R06.02 SHORTNESS OF BREATH: ICD-10-CM

## 2025-08-27 DIAGNOSIS — I10 ESSENTIAL HYPERTENSION: ICD-10-CM

## 2025-08-27 DIAGNOSIS — I25.10 CORONARY ARTERY DISEASE, UNSPECIFIED VESSEL OR LESION TYPE, UNSPECIFIED WHETHER ANGINA PRESENT, UNSPECIFIED WHETHER NATIVE OR TRANSPLANTED HEART: ICD-10-CM

## 2025-08-27 DIAGNOSIS — R60.9 EDEMA, UNSPECIFIED TYPE: ICD-10-CM

## 2025-08-27 PROCEDURE — 1123F ACP DISCUSS/DSCN MKR DOCD: CPT | Performed by: INTERNAL MEDICINE

## 2025-08-27 PROCEDURE — 1126F AMNT PAIN NOTED NONE PRSNT: CPT | Performed by: INTERNAL MEDICINE

## 2025-08-27 PROCEDURE — 99215 OFFICE O/P EST HI 40 MIN: CPT | Performed by: INTERNAL MEDICINE

## 2025-08-27 PROCEDURE — 1090F PRES/ABSN URINE INCON ASSESS: CPT | Performed by: INTERNAL MEDICINE

## 2025-08-27 PROCEDURE — 3077F SYST BP >= 140 MM HG: CPT | Performed by: INTERNAL MEDICINE

## 2025-08-27 PROCEDURE — G8417 CALC BMI ABV UP PARAM F/U: HCPCS | Performed by: INTERNAL MEDICINE

## 2025-08-27 PROCEDURE — 1036F TOBACCO NON-USER: CPT | Performed by: INTERNAL MEDICINE

## 2025-08-27 PROCEDURE — G8428 CUR MEDS NOT DOCUMENT: HCPCS | Performed by: INTERNAL MEDICINE

## 2025-08-27 PROCEDURE — 3078F DIAST BP <80 MM HG: CPT | Performed by: INTERNAL MEDICINE

## 2025-08-27 PROCEDURE — G8399 PT W/DXA RESULTS DOCUMENT: HCPCS | Performed by: INTERNAL MEDICINE

## 2025-08-27 PROCEDURE — 3017F COLORECTAL CA SCREEN DOC REV: CPT | Performed by: INTERNAL MEDICINE

## 2025-08-27 PROCEDURE — 93000 ELECTROCARDIOGRAM COMPLETE: CPT | Performed by: INTERNAL MEDICINE

## 2025-08-27 RX ORDER — FUROSEMIDE 20 MG/1
20 TABLET ORAL DAILY PRN
Qty: 60 TABLET | Refills: 5 | Status: SHIPPED | OUTPATIENT
Start: 2025-08-27

## 2025-08-27 RX ORDER — NITROGLYCERIN 0.4 MG/1
0.4 TABLET SUBLINGUAL EVERY 5 MIN PRN
Qty: 25 TABLET | Refills: 3 | Status: SHIPPED | OUTPATIENT
Start: 2025-08-27

## 2025-08-27 ASSESSMENT — PATIENT HEALTH QUESTIONNAIRE - PHQ9
SUM OF ALL RESPONSES TO PHQ QUESTIONS 1-9: 0
1. LITTLE INTEREST OR PLEASURE IN DOING THINGS: NOT AT ALL
SUM OF ALL RESPONSES TO PHQ QUESTIONS 1-9: 0
SUM OF ALL RESPONSES TO PHQ QUESTIONS 1-9: 0
2. FEELING DOWN, DEPRESSED OR HOPELESS: NOT AT ALL
SUM OF ALL RESPONSES TO PHQ QUESTIONS 1-9: 0

## 2025-09-05 RX ORDER — CLOPIDOGREL BISULFATE 75 MG/1
75 TABLET ORAL DAILY
Qty: 90 TABLET | Refills: 3 | Status: SHIPPED | OUTPATIENT
Start: 2025-09-05

## (undated) DEVICE — RADIFOCUS OPTITORQUE ANGIOGRAPHIC CATHETER: Brand: OPTITORQUE

## (undated) DEVICE — SET FLD ADMIN 3 W STPCOCK FIX FEM L BOR 1IN

## (undated) DEVICE — SYRINGE MED 10ML LUERLOCK TIP W/O SFTY DISP

## (undated) DEVICE — GLIDESHEATH ACCESS KIT HYDROPHILIC COATED INTRODUCER SHEATH: Brand: GLIDESHEATH

## (undated) DEVICE — CATHETER GUID 5FR L100CM ID0.056IN S STL NYL PTFE XB3 LBT L

## (undated) DEVICE — CANNULA ORIG TL CLR W FOAM CUSHIONS AND 14FT SUPL TB 3 CHN

## (undated) DEVICE — CATHETER SUCT TR FL TIP 14FR W/ O CTRL

## (undated) DEVICE — HI-TORQUE BALANCE MIDDLEWEIGHT GUIDE WIRE W/HYDROCOAT .014 STRAIGHT TIP 3.0 CM X 300 CM: Brand: HI-TORQUE BALANCE MIDDLEWEIGHT

## (undated) DEVICE — SYRINGE MED 25GA 3ML L5/8IN SUBQ PLAS W/ DETACH NDL SFTY

## (undated) DEVICE — SYRINGE 20ML LL S/C 50

## (undated) DEVICE — SNARE POLYP M W27MMXL240CM OVL STIFF DISP CAPTIVATOR

## (undated) DEVICE — CATH BLLN ANGIO 2.50X12MM SC EUPHORA RX

## (undated) DEVICE — Device

## (undated) DEVICE — YANKAUER,SMOOTH HANDLE,HIGH CAPACITY: Brand: MEDLINE INDUSTRIES, INC.

## (undated) DEVICE — SOLUTION IRRIG 1000ML H2O STRL BLT

## (undated) DEVICE — PACK PROCEDURE SURG VASC CATH 161 MMC LF

## (undated) DEVICE — SYRINGE MED 50ML LUERSLIP TIP

## (undated) DEVICE — Device: Brand: D-STAT® RADIAL TOPICAL HEMOSTAT

## (undated) DEVICE — PROCEDURE KIT FLUID MGMT 10 FR CUST MAINFOLD

## (undated) DEVICE — LINER SUCT CANSTR 3000CC PLAS SFT PRE ASSEMB W/OUT TBNG W/

## (undated) DEVICE — INFLATION DEVICE KIT: Brand: ENCORE™ 26 ADVANTAGE KIT

## (undated) DEVICE — UNDERPAD INCONT W23XL36IN STD BLU POLYPR BK FLUF SFT

## (undated) DEVICE — GLIDESHEATH SLENDER STAINLESS STEEL KIT: Brand: GLIDESHEATH SLENDER

## (undated) DEVICE — COPILOT BLEEDBACK CONTROL VALVE: Brand: COPILOT

## (undated) DEVICE — CATHETER GUID 5.5FR L150CM RAP EXCHG L25CM TIP 4.8FR PUSH

## (undated) DEVICE — MEDI-VAC NON-CONDUCTIVE SUCTION TUBING: Brand: CARDINAL HEALTH

## (undated) DEVICE — CANNULA NSL AD TBNG L14FT STD PVC O2 CRV CONN NONFLARED NSL

## (undated) DEVICE — XIENCE SIERRA™ EVEROLIMUS ELUTING CORONARY STENT SYSTEM 2.50 MM X 18 MM / RAPID-EXCHANGE
Type: IMPLANTABLE DEVICE | Status: NON-FUNCTIONAL
Brand: XIENCE SIERRA™
Removed: 2023-09-14

## (undated) DEVICE — CATHETER BLLN SPRINTER OTW 2MMX12MM 138CM

## (undated) DEVICE — DEVICE INFL W ACCS + HEMSTAS VLV INSRT TOOL AND TORQ BASIX

## (undated) DEVICE — HI-TORQUE BALANCE GUIDE WIRE W/HYDROCOAT .014 STRAIGHT TIP 3.0 CM X 190 CM: Brand: HI-TORQUE BALANCE

## (undated) DEVICE — CATHETER GUID ADRT 058IN 5FR XB 55840000

## (undated) DEVICE — PRESSURE MONITORING SET: Brand: TRUWAVE

## (undated) DEVICE — GAUZE,SPONGE,4"X4",16PLY,STRL,LF,10/TRAY: Brand: MEDLINE

## (undated) DEVICE — CATH BLLN ANGIO 2.25X12MM SC EUPHORA RX

## (undated) DEVICE — TRAP SPEC POLYP REM STRNR CLN DSGN MAGNIFYING WIND DISP

## (undated) DEVICE — ENDOSCOPY PUMP TUBING/ CAP SET: Brand: ERBE

## (undated) DEVICE — GOWN PLASTIC FILM THMBHKS UNIV BLUE: Brand: CARDINAL HEALTH